# Patient Record
Sex: FEMALE | Race: WHITE | Employment: OTHER | URBAN - METROPOLITAN AREA
[De-identification: names, ages, dates, MRNs, and addresses within clinical notes are randomized per-mention and may not be internally consistent; named-entity substitution may affect disease eponyms.]

---

## 2023-01-20 PROBLEM — M99.03 SEGMENTAL AND SOMATIC DYSFUNCTION OF LUMBAR REGION: Status: ACTIVE | Noted: 2023-01-20

## 2023-01-20 PROBLEM — M50.30 DEGENERATION OF INTERVERTEBRAL DISC OF CERVICAL REGION: Status: ACTIVE | Noted: 2023-01-20

## 2023-01-20 PROBLEM — R09.82 POSTNASAL DRIP: Status: ACTIVE | Noted: 2023-01-20

## 2023-01-20 PROBLEM — M40.04 POSTURAL KYPHOSIS OF THORACIC REGION: Status: ACTIVE | Noted: 2023-01-20

## 2023-01-20 PROBLEM — M25.561 RIGHT KNEE PAIN: Status: ACTIVE | Noted: 2023-01-20

## 2023-01-20 PROBLEM — M99.04 SEGMENTAL AND SOMATIC DYSFUNCTION OF SACRAL REGION: Status: ACTIVE | Noted: 2023-01-20

## 2023-01-20 PROBLEM — M17.11 RIGHT KNEE DJD: Status: ACTIVE | Noted: 2023-01-20

## 2023-01-20 PROBLEM — M53.3 SACRAL BACK PAIN: Status: ACTIVE | Noted: 2023-01-20

## 2023-01-20 PROBLEM — M23.90 INTERNAL DERANGEMENT OF KNEE: Status: ACTIVE | Noted: 2023-01-20

## 2023-01-20 PROBLEM — M79.10 MYALGIA: Status: ACTIVE | Noted: 2023-01-20

## 2023-01-20 PROBLEM — M99.02 SEGMENTAL AND SOMATIC DYSFUNCTION OF THORACIC REGION: Status: ACTIVE | Noted: 2023-01-20

## 2023-01-20 PROBLEM — H93.19 TINNITUS: Status: ACTIVE | Noted: 2023-01-20

## 2023-01-20 PROBLEM — B37.31 VAGINAL YEAST INFECTION: Status: ACTIVE | Noted: 2023-01-20

## 2023-01-20 PROBLEM — E78.5 HYPERLIPIDEMIA: Status: ACTIVE | Noted: 2023-01-20

## 2023-01-20 PROBLEM — M99.00 SEGMENTAL AND SOMATIC DYSFUNCTION OF HEAD REGION: Status: ACTIVE | Noted: 2023-01-20

## 2023-01-20 PROBLEM — J45.909 ASTHMA (HHS-HCC): Status: ACTIVE | Noted: 2023-01-20

## 2023-01-20 PROBLEM — M54.41 ACUTE RIGHT-SIDED LOW BACK PAIN WITH RIGHT-SIDED SCIATICA: Status: ACTIVE | Noted: 2023-01-20

## 2023-01-20 PROBLEM — S80.12XA CONTUSION, KNEE AND LOWER LEG, LEFT, INITIAL ENCOUNTER: Status: ACTIVE | Noted: 2023-01-20

## 2023-01-20 PROBLEM — J34.2 DEVIATED NASAL SEPTUM: Status: ACTIVE | Noted: 2023-01-20

## 2023-01-20 PROBLEM — F90.9 ATTENTION DEFICIT HYPERACTIVITY DISORDER (ADHD): Status: ACTIVE | Noted: 2023-01-20

## 2023-01-20 PROBLEM — S83.8X1A INJURY OF MENISCUS OF RIGHT KNEE: Status: ACTIVE | Noted: 2023-01-20

## 2023-01-20 PROBLEM — M26.659 TMJ ARTHROPATHY: Status: ACTIVE | Noted: 2023-01-20

## 2023-01-20 PROBLEM — S00.33XA CONTUSION OF NOSE: Status: ACTIVE | Noted: 2023-01-20

## 2023-01-20 PROBLEM — M99.05 SEGMENTAL AND SOMATIC DYSFUNCTION OF PELVIC REGION: Status: ACTIVE | Noted: 2023-01-20

## 2023-01-20 PROBLEM — S80.02XA CONTUSION, KNEE AND LOWER LEG, LEFT, INITIAL ENCOUNTER: Status: ACTIVE | Noted: 2023-01-20

## 2023-01-20 PROBLEM — H90.3 BILATERAL SENSORINEURAL HEARING LOSS: Status: ACTIVE | Noted: 2023-01-20

## 2023-01-20 PROBLEM — R42 EQUILIBRIUM DISORDER: Status: ACTIVE | Noted: 2023-01-20

## 2023-01-20 PROBLEM — R53.83 FATIGUE: Status: ACTIVE | Noted: 2023-01-20

## 2023-01-20 PROBLEM — R29.6 FREQUENT FALLS: Status: ACTIVE | Noted: 2023-01-20

## 2023-01-20 PROBLEM — R14.0 ABDOMINAL BLOATING: Status: ACTIVE | Noted: 2023-01-20

## 2023-01-20 PROBLEM — M47.812 SPONDYLOSIS OF CERVICAL REGION WITHOUT MYELOPATHY OR RADICULOPATHY: Status: ACTIVE | Noted: 2023-01-20

## 2023-01-20 PROBLEM — R25.1 TREMOR: Status: ACTIVE | Noted: 2023-01-20

## 2023-01-20 PROBLEM — M25.461 EFFUSION OF RIGHT KNEE: Status: ACTIVE | Noted: 2023-01-20

## 2023-01-20 PROBLEM — G25.3 MYOCLONUS: Status: ACTIVE | Noted: 2023-01-20

## 2023-01-20 PROBLEM — M54.42 CHRONIC LEFT-SIDED LOW BACK PAIN WITH LEFT-SIDED SCIATICA: Status: ACTIVE | Noted: 2023-01-20

## 2023-01-20 PROBLEM — H90.5 SENSORINEURAL HEARING LOSS (SNHL): Status: ACTIVE | Noted: 2023-01-20

## 2023-01-20 PROBLEM — M54.2 NECK PAIN: Status: ACTIVE | Noted: 2023-01-20

## 2023-01-20 PROBLEM — J30.9 ALLERGIC RHINITIS: Status: ACTIVE | Noted: 2023-01-20

## 2023-01-20 PROBLEM — F41.9 ANXIETY: Status: ACTIVE | Noted: 2023-01-20

## 2023-01-20 PROBLEM — G44.86 HEADACHE, CERVICOGENIC: Status: ACTIVE | Noted: 2023-01-20

## 2023-01-20 PROBLEM — F33.9 RECURRENT MAJOR DEPRESSIVE DISORDER (CMS-HCC): Status: ACTIVE | Noted: 2023-01-20

## 2023-01-20 PROBLEM — M99.01 SEGMENTAL AND SOMATIC DYSFUNCTION OF CERVICAL REGION: Status: ACTIVE | Noted: 2023-01-20

## 2023-01-20 PROBLEM — N95.2 VAGINAL ATROPHY: Status: ACTIVE | Noted: 2023-01-20

## 2023-01-20 PROBLEM — M79.9 POSTURAL STRAIN: Status: ACTIVE | Noted: 2023-01-20

## 2023-01-20 PROBLEM — K59.09 CHRONIC CONSTIPATION: Status: ACTIVE | Noted: 2023-01-20

## 2023-01-20 PROBLEM — M79.89 MASS OF SOFT TISSUE OF RIGHT LOWER EXTREMITY: Status: ACTIVE | Noted: 2023-01-20

## 2023-01-20 PROBLEM — J32.0 MAXILLARY SINUSITIS: Status: ACTIVE | Noted: 2023-01-20

## 2023-01-20 PROBLEM — G89.29 CHRONIC LEFT-SIDED LOW BACK PAIN WITH LEFT-SIDED SCIATICA: Status: ACTIVE | Noted: 2023-01-20

## 2023-01-20 PROBLEM — R41.3 MEMORY LOSS: Status: ACTIVE | Noted: 2023-01-20

## 2023-01-20 RX ORDER — AZELASTINE 1 MG/ML
2 SPRAY, METERED NASAL 2 TIMES DAILY
COMMUNITY
Start: 2022-05-08 | End: 2023-10-03

## 2023-01-20 RX ORDER — FLUOXETINE HYDROCHLORIDE 40 MG/1
1 CAPSULE ORAL DAILY
COMMUNITY
Start: 2016-11-21 | End: 2023-12-14 | Stop reason: SDUPTHER

## 2023-01-20 RX ORDER — LORAZEPAM 0.5 MG/1
0.5 TABLET ORAL 3 TIMES DAILY PRN
COMMUNITY
Start: 2019-09-17 | End: 2023-12-14 | Stop reason: SDUPTHER

## 2023-01-20 RX ORDER — FLUTICASONE PROPIONATE AND SALMETEROL 100; 50 UG/1; UG/1
1 POWDER RESPIRATORY (INHALATION)
COMMUNITY
End: 2023-07-25

## 2023-01-20 RX ORDER — DULOXETIN HYDROCHLORIDE 30 MG/1
1 CAPSULE, DELAYED RELEASE ORAL DAILY
COMMUNITY
Start: 2022-05-31 | End: 2023-12-14 | Stop reason: SDUPTHER

## 2023-01-20 RX ORDER — LAMOTRIGINE 150 MG/1
2 TABLET ORAL DAILY
COMMUNITY
Start: 2020-12-15 | End: 2023-12-14 | Stop reason: SDUPTHER

## 2023-01-20 RX ORDER — VIT C/E/ZN/COPPR/LUTEIN/ZEAXAN 250MG-90MG
25 CAPSULE ORAL
COMMUNITY

## 2023-01-20 RX ORDER — MAGNESIUM 200 MG
TABLET ORAL
COMMUNITY
Start: 2021-12-20 | End: 2023-12-13 | Stop reason: WASHOUT

## 2023-01-20 RX ORDER — LEVOMEFOLATE/ALGAL OIL 15-90.314
1 CAPSULE ORAL DAILY
COMMUNITY
Start: 2021-11-17

## 2023-01-20 RX ORDER — CYANOCOBALAMIN (VITAMIN B-12) 250 MCG
250 TABLET ORAL
COMMUNITY
End: 2023-12-13 | Stop reason: WASHOUT

## 2023-01-20 RX ORDER — GUAIFENESIN 1200 MG/1
1 TABLET, EXTENDED RELEASE ORAL EVERY 12 HOURS
COMMUNITY

## 2023-01-20 RX ORDER — ALBUTEROL SULFATE 90 UG/1
1-2 AEROSOL, METERED RESPIRATORY (INHALATION) EVERY 6 HOURS PRN
COMMUNITY

## 2023-03-20 ENCOUNTER — OFFICE VISIT (OUTPATIENT)
Dept: PRIMARY CARE | Facility: CLINIC | Age: 68
End: 2023-03-20
Payer: MEDICARE

## 2023-03-20 VITALS
TEMPERATURE: 97.7 F | BODY MASS INDEX: 20.88 KG/M2 | RESPIRATION RATE: 16 BRPM | DIASTOLIC BLOOD PRESSURE: 50 MMHG | HEART RATE: 78 BPM | WEIGHT: 111.4 LBS | SYSTOLIC BLOOD PRESSURE: 114 MMHG | OXYGEN SATURATION: 98 %

## 2023-03-20 DIAGNOSIS — F33.40 RECURRENT MAJOR DEPRESSIVE DISORDER, IN REMISSION (CMS-HCC): ICD-10-CM

## 2023-03-20 DIAGNOSIS — J32.0 CHRONIC MAXILLARY SINUSITIS: ICD-10-CM

## 2023-03-20 DIAGNOSIS — R09.82 POSTNASAL DRIP: ICD-10-CM

## 2023-03-20 DIAGNOSIS — Z00.00 HEALTH MAINTENANCE EXAMINATION: Primary | ICD-10-CM

## 2023-03-20 DIAGNOSIS — Z12.31 ENCOUNTER FOR SCREENING MAMMOGRAM FOR MALIGNANT NEOPLASM OF BREAST: ICD-10-CM

## 2023-03-20 DIAGNOSIS — E78.2 MIXED HYPERLIPIDEMIA: ICD-10-CM

## 2023-03-20 DIAGNOSIS — Z00.00 HEALTHCARE MAINTENANCE: ICD-10-CM

## 2023-03-20 DIAGNOSIS — K59.04 CHRONIC IDIOPATHIC CONSTIPATION: ICD-10-CM

## 2023-03-20 DIAGNOSIS — J30.1 SEASONAL ALLERGIC RHINITIS DUE TO POLLEN: ICD-10-CM

## 2023-03-20 DIAGNOSIS — J45.20 MILD INTERMITTENT ASTHMA WITHOUT COMPLICATION (HHS-HCC): ICD-10-CM

## 2023-03-20 DIAGNOSIS — J32.8 OTHER CHRONIC SINUSITIS: ICD-10-CM

## 2023-03-20 DIAGNOSIS — K59.09 CHRONIC CONSTIPATION: ICD-10-CM

## 2023-03-20 PROBLEM — M54.42 CHRONIC LEFT-SIDED LOW BACK PAIN WITH LEFT-SIDED SCIATICA: Status: RESOLVED | Noted: 2023-01-20 | Resolved: 2023-03-20

## 2023-03-20 PROBLEM — S80.12XA CONTUSION, KNEE AND LOWER LEG, LEFT, INITIAL ENCOUNTER: Status: RESOLVED | Noted: 2023-01-20 | Resolved: 2023-03-20

## 2023-03-20 PROBLEM — H93.19 TINNITUS: Status: RESOLVED | Noted: 2023-01-20 | Resolved: 2023-03-20

## 2023-03-20 PROBLEM — M23.90 INTERNAL DERANGEMENT OF KNEE: Status: RESOLVED | Noted: 2023-01-20 | Resolved: 2023-03-20

## 2023-03-20 PROBLEM — R14.0 ABDOMINAL BLOATING: Status: RESOLVED | Noted: 2023-01-20 | Resolved: 2023-03-20

## 2023-03-20 PROBLEM — S80.02XA CONTUSION, KNEE AND LOWER LEG, LEFT, INITIAL ENCOUNTER: Status: RESOLVED | Noted: 2023-01-20 | Resolved: 2023-03-20

## 2023-03-20 PROBLEM — R42 EQUILIBRIUM DISORDER: Status: RESOLVED | Noted: 2023-01-20 | Resolved: 2023-03-20

## 2023-03-20 PROBLEM — M99.00 SEGMENTAL AND SOMATIC DYSFUNCTION OF HEAD REGION: Status: RESOLVED | Noted: 2023-01-20 | Resolved: 2023-03-20

## 2023-03-20 PROBLEM — M40.04 POSTURAL KYPHOSIS OF THORACIC REGION: Status: RESOLVED | Noted: 2023-01-20 | Resolved: 2023-03-20

## 2023-03-20 PROBLEM — M25.561 RIGHT KNEE PAIN: Status: RESOLVED | Noted: 2023-01-20 | Resolved: 2023-03-20

## 2023-03-20 PROBLEM — M54.2 NECK PAIN: Status: RESOLVED | Noted: 2023-01-20 | Resolved: 2023-03-20

## 2023-03-20 PROBLEM — M99.01 SEGMENTAL AND SOMATIC DYSFUNCTION OF CERVICAL REGION: Status: RESOLVED | Noted: 2023-01-20 | Resolved: 2023-03-20

## 2023-03-20 PROBLEM — M54.41 ACUTE RIGHT-SIDED LOW BACK PAIN WITH RIGHT-SIDED SCIATICA: Status: RESOLVED | Noted: 2023-01-20 | Resolved: 2023-03-20

## 2023-03-20 PROBLEM — S83.8X1A INJURY OF MENISCUS OF RIGHT KNEE: Status: RESOLVED | Noted: 2023-01-20 | Resolved: 2023-03-20

## 2023-03-20 PROBLEM — M50.30 DEGENERATION OF INTERVERTEBRAL DISC OF CERVICAL REGION: Status: RESOLVED | Noted: 2023-01-20 | Resolved: 2023-03-20

## 2023-03-20 PROBLEM — G89.29 CHRONIC LEFT-SIDED LOW BACK PAIN WITH LEFT-SIDED SCIATICA: Status: RESOLVED | Noted: 2023-01-20 | Resolved: 2023-03-20

## 2023-03-20 PROBLEM — R25.1 TREMOR: Status: RESOLVED | Noted: 2023-01-20 | Resolved: 2023-03-20

## 2023-03-20 PROBLEM — M79.10 MYALGIA: Status: RESOLVED | Noted: 2023-01-20 | Resolved: 2023-03-20

## 2023-03-20 PROBLEM — R29.6 FREQUENT FALLS: Status: RESOLVED | Noted: 2023-01-20 | Resolved: 2023-03-20

## 2023-03-20 PROBLEM — M25.461 EFFUSION OF RIGHT KNEE: Status: RESOLVED | Noted: 2023-01-20 | Resolved: 2023-03-20

## 2023-03-20 PROBLEM — R41.3 MEMORY LOSS: Status: RESOLVED | Noted: 2023-01-20 | Resolved: 2023-03-20

## 2023-03-20 PROBLEM — M79.89 MASS OF SOFT TISSUE OF RIGHT LOWER EXTREMITY: Status: RESOLVED | Noted: 2023-01-20 | Resolved: 2023-03-20

## 2023-03-20 PROBLEM — J34.2 DEVIATED NASAL SEPTUM: Status: RESOLVED | Noted: 2023-01-20 | Resolved: 2023-03-20

## 2023-03-20 PROBLEM — M99.04 SEGMENTAL AND SOMATIC DYSFUNCTION OF SACRAL REGION: Status: RESOLVED | Noted: 2023-01-20 | Resolved: 2023-03-20

## 2023-03-20 PROBLEM — S00.33XA CONTUSION OF NOSE: Status: RESOLVED | Noted: 2023-01-20 | Resolved: 2023-03-20

## 2023-03-20 PROBLEM — M99.03 SEGMENTAL AND SOMATIC DYSFUNCTION OF LUMBAR REGION: Status: RESOLVED | Noted: 2023-01-20 | Resolved: 2023-03-20

## 2023-03-20 PROBLEM — M79.9 POSTURAL STRAIN: Status: RESOLVED | Noted: 2023-01-20 | Resolved: 2023-03-20

## 2023-03-20 PROBLEM — R53.83 FATIGUE: Status: RESOLVED | Noted: 2023-01-20 | Resolved: 2023-03-20

## 2023-03-20 PROBLEM — M99.02 SEGMENTAL AND SOMATIC DYSFUNCTION OF THORACIC REGION: Status: RESOLVED | Noted: 2023-01-20 | Resolved: 2023-03-20

## 2023-03-20 PROBLEM — M17.11 RIGHT KNEE DJD: Status: RESOLVED | Noted: 2023-01-20 | Resolved: 2023-03-20

## 2023-03-20 PROBLEM — N95.2 VAGINAL ATROPHY: Status: RESOLVED | Noted: 2023-01-20 | Resolved: 2023-03-20

## 2023-03-20 PROBLEM — M99.05 SEGMENTAL AND SOMATIC DYSFUNCTION OF PELVIC REGION: Status: RESOLVED | Noted: 2023-01-20 | Resolved: 2023-03-20

## 2023-03-20 PROBLEM — B37.31 VAGINAL YEAST INFECTION: Status: RESOLVED | Noted: 2023-01-20 | Resolved: 2023-03-20

## 2023-03-20 PROCEDURE — 1036F TOBACCO NON-USER: CPT | Performed by: INTERNAL MEDICINE

## 2023-03-20 PROCEDURE — 99214 OFFICE O/P EST MOD 30 MIN: CPT | Performed by: INTERNAL MEDICINE

## 2023-03-20 PROCEDURE — 1160F RVW MEDS BY RX/DR IN RCRD: CPT | Performed by: INTERNAL MEDICINE

## 2023-03-20 PROCEDURE — 1159F MED LIST DOCD IN RCRD: CPT | Performed by: INTERNAL MEDICINE

## 2023-03-20 RX ORDER — CYANOCOBALAMIN (VITAMIN B-12) 500 MCG
TABLET ORAL
COMMUNITY

## 2023-03-20 RX ORDER — CYCLOBENZAPRINE HCL 10 MG
TABLET ORAL
COMMUNITY
Start: 2023-03-18

## 2023-03-20 RX ORDER — PREDNISONE 20 MG/1
TABLET ORAL
Qty: 15 TABLET | Refills: 0 | Status: SHIPPED | OUTPATIENT
Start: 2023-03-20 | End: 2023-04-01

## 2023-03-20 ASSESSMENT — ENCOUNTER SYMPTOMS
CONSTITUTIONAL NEGATIVE: 1
CONSTIPATION: 1
MUSCULOSKELETAL NEGATIVE: 1
RESPIRATORY NEGATIVE: 1
CARDIOVASCULAR NEGATIVE: 1

## 2023-03-20 NOTE — PROGRESS NOTES
Subjective   Patient ID: Naomi Dunaway is a 68 y.o. female who presents for Follow-up.  Feels well  Sinusitis has finally resolved with a repeat course of antibiotics and steroids in Feb /early march    Using flonase and mucinex  More frequent dyspnea again- more congested; coughing; pnd  Has known allergies to dust and mold - sees Dr Torres  She is using her advair regularly  Not using albuterol as much        Review of Systems   Constitutional: Negative.    Respiratory: Negative.     Cardiovascular: Negative.    Gastrointestinal:  Positive for constipation.   Genitourinary: Negative.    Musculoskeletal: Negative.        Objective   Physical Exam  Vitals and nursing note reviewed.   HENT:      Mouth/Throat:      Comments: Thick PND  Cardiovascular:      Rate and Rhythm: Normal rate and regular rhythm.   Pulmonary:      Effort: Pulmonary effort is normal.      Breath sounds: Normal breath sounds.         Assessment/Plan   Problem List Items Addressed This Visit       Allergic rhinitis    Relevant Orders    Referral to Allergy    Chronic constipation    Hyperlipidemia    Maxillary sinusitis    RESOLVED: Postnasal drip    Recurrent major depressive disorder (CMS/HCC)     Stable         Asthma     Other Visit Diagnoses       Health maintenance examination    -  Primary    Relevant Orders    Lipid Panel    TSH with reflex to Free T4 if abnormal    CBC    Healthcare maintenance        Relevant Orders    Comprehensive Metabolic Panel    Encounter for screening mammogram for malignant neoplasm of breast        Relevant Orders    BI mammo bilateral screening tomosynthesis    Chronic idiopathic constipation        Relevant Orders    CBC    Other chronic sinusitis        Relevant Medications    predniSONE (Deltasone) 20 mg tablet          \

## 2023-06-13 PROBLEM — M50.30 DEGENERATION OF INTERVERTEBRAL DISC OF CERVICAL REGION: Status: ACTIVE | Noted: 2023-06-13

## 2023-06-13 PROBLEM — R14.0 ABDOMINAL BLOATING: Status: ACTIVE | Noted: 2023-06-13

## 2023-06-13 PROBLEM — R29.6 FREQUENT FALLS: Status: ACTIVE | Noted: 2023-06-13

## 2023-06-13 PROBLEM — S80.02XA CONTUSION, KNEE AND LOWER LEG, LEFT, INITIAL ENCOUNTER: Status: ACTIVE | Noted: 2023-06-13

## 2023-06-13 PROBLEM — S83.8X1A INJURY OF MENISCUS OF RIGHT KNEE: Status: ACTIVE | Noted: 2023-06-13

## 2023-06-13 PROBLEM — J32.2 CHRONIC ETHMOIDAL SINUSITIS: Status: ACTIVE | Noted: 2023-06-13

## 2023-06-13 PROBLEM — R42 EQUILIBRIUM DISORDER: Status: ACTIVE | Noted: 2023-06-13

## 2023-06-13 PROBLEM — S00.33XA CONTUSION OF NOSE: Status: ACTIVE | Noted: 2023-06-13

## 2023-06-13 PROBLEM — G89.29 CHRONIC LEFT-SIDED LOW BACK PAIN WITH LEFT-SIDED SCIATICA: Status: ACTIVE | Noted: 2023-06-13

## 2023-06-13 PROBLEM — M54.2 NECK PAIN: Status: ACTIVE | Noted: 2023-06-13

## 2023-06-13 PROBLEM — K21.9 GERD (GASTROESOPHAGEAL REFLUX DISEASE): Status: ACTIVE | Noted: 2023-06-13

## 2023-06-13 PROBLEM — M25.561 RIGHT KNEE PAIN: Status: ACTIVE | Noted: 2023-06-13

## 2023-06-13 PROBLEM — H93.19 TINNITUS: Status: ACTIVE | Noted: 2023-06-13

## 2023-06-13 PROBLEM — B37.31 VAGINAL YEAST INFECTION: Status: ACTIVE | Noted: 2023-06-13

## 2023-06-13 PROBLEM — M79.89 MASS OF SOFT TISSUE OF RIGHT LOWER EXTREMITY: Status: ACTIVE | Noted: 2023-06-13

## 2023-06-13 PROBLEM — R09.82 POSTNASAL DRIP: Status: ACTIVE | Noted: 2023-06-13

## 2023-06-13 PROBLEM — M54.42 CHRONIC LEFT-SIDED LOW BACK PAIN WITH LEFT-SIDED SCIATICA: Status: ACTIVE | Noted: 2023-06-13

## 2023-06-13 PROBLEM — N95.2 VAGINAL ATROPHY: Status: ACTIVE | Noted: 2023-06-13

## 2023-06-13 PROBLEM — R09.81 NASAL CONGESTION: Status: ACTIVE | Noted: 2023-06-13

## 2023-06-13 PROBLEM — M25.461 EFFUSION OF RIGHT KNEE: Status: ACTIVE | Noted: 2023-06-13

## 2023-06-13 PROBLEM — M79.9 POSTURAL STRAIN: Status: ACTIVE | Noted: 2023-06-13

## 2023-06-13 PROBLEM — M79.10 MYALGIA: Status: ACTIVE | Noted: 2023-06-13

## 2023-06-13 PROBLEM — S80.12XA CONTUSION, KNEE AND LOWER LEG, LEFT, INITIAL ENCOUNTER: Status: ACTIVE | Noted: 2023-06-13

## 2023-06-13 PROBLEM — F33.42 RECURRENT MAJOR DEPRESSIVE DISORDER, IN FULL REMISSION (CMS-HCC): Status: ACTIVE | Noted: 2023-06-13

## 2023-06-13 PROBLEM — M99.09 SEGMENTAL AND SOMATIC DYSFUNCTION: Status: ACTIVE | Noted: 2023-06-13

## 2023-06-13 PROBLEM — R41.3 MEMORY LOSS: Status: ACTIVE | Noted: 2023-06-13

## 2023-06-13 PROBLEM — R53.83 FATIGUE: Status: ACTIVE | Noted: 2023-06-13

## 2023-06-13 PROBLEM — M23.90 INTERNAL DERANGEMENT OF KNEE: Status: ACTIVE | Noted: 2023-06-13

## 2023-06-13 PROBLEM — M54.41 ACUTE RIGHT-SIDED LOW BACK PAIN WITH RIGHT-SIDED SCIATICA: Status: ACTIVE | Noted: 2023-06-13

## 2023-06-13 PROBLEM — M40.04 POSTURAL KYPHOSIS OF THORACIC REGION: Status: ACTIVE | Noted: 2023-06-13

## 2023-06-13 PROBLEM — R25.1 TREMOR: Status: ACTIVE | Noted: 2023-06-13

## 2023-06-13 PROBLEM — J34.2 DEVIATED NASAL SEPTUM: Status: ACTIVE | Noted: 2023-06-13

## 2023-06-13 PROBLEM — M17.11 RIGHT KNEE DJD: Status: ACTIVE | Noted: 2023-06-13

## 2023-06-13 RX ORDER — MOMETASONE FUROATE 100 %
POWDER (GRAM) MISCELLANEOUS
COMMUNITY
Start: 2023-04-06 | End: 2024-04-15 | Stop reason: SDUPTHER

## 2023-06-13 RX ORDER — PANTOPRAZOLE SODIUM 40 MG/1
1 TABLET, DELAYED RELEASE ORAL DAILY
COMMUNITY
Start: 2023-05-30

## 2023-06-16 ENCOUNTER — LAB (OUTPATIENT)
Dept: LAB | Facility: LAB | Age: 68
End: 2023-06-16
Payer: MEDICARE

## 2023-06-16 ENCOUNTER — OFFICE VISIT (OUTPATIENT)
Dept: PRIMARY CARE | Facility: CLINIC | Age: 68
End: 2023-06-16
Payer: MEDICARE

## 2023-06-16 VITALS
TEMPERATURE: 97.1 F | BODY MASS INDEX: 20.58 KG/M2 | DIASTOLIC BLOOD PRESSURE: 62 MMHG | SYSTOLIC BLOOD PRESSURE: 128 MMHG | HEART RATE: 57 BPM | WEIGHT: 109.8 LBS | RESPIRATION RATE: 16 BRPM | OXYGEN SATURATION: 97 %

## 2023-06-16 DIAGNOSIS — F90.9 ATTENTION DEFICIT HYPERACTIVITY DISORDER (ADHD), UNSPECIFIED ADHD TYPE: ICD-10-CM

## 2023-06-16 DIAGNOSIS — Z00.00 HEALTHCARE MAINTENANCE: ICD-10-CM

## 2023-06-16 DIAGNOSIS — J30.2 SEASONAL ALLERGIC RHINITIS, UNSPECIFIED TRIGGER: ICD-10-CM

## 2023-06-16 DIAGNOSIS — K59.04 CHRONIC IDIOPATHIC CONSTIPATION: ICD-10-CM

## 2023-06-16 DIAGNOSIS — K21.9 GASTROESOPHAGEAL REFLUX DISEASE WITHOUT ESOPHAGITIS: ICD-10-CM

## 2023-06-16 DIAGNOSIS — Z00.00 HEALTH MAINTENANCE EXAMINATION: ICD-10-CM

## 2023-06-16 DIAGNOSIS — J45.20 MILD INTERMITTENT ASTHMA, UNSPECIFIED WHETHER COMPLICATED (HHS-HCC): ICD-10-CM

## 2023-06-16 DIAGNOSIS — Z00.00 ROUTINE GENERAL MEDICAL EXAMINATION AT HEALTH CARE FACILITY: Primary | ICD-10-CM

## 2023-06-16 PROBLEM — M79.9 POSTURAL STRAIN: Status: RESOLVED | Noted: 2023-06-13 | Resolved: 2023-06-16

## 2023-06-16 PROBLEM — M25.561 RIGHT KNEE PAIN: Status: RESOLVED | Noted: 2023-06-13 | Resolved: 2023-06-16

## 2023-06-16 PROBLEM — R42 EQUILIBRIUM DISORDER: Status: RESOLVED | Noted: 2023-06-13 | Resolved: 2023-06-16

## 2023-06-16 PROBLEM — M54.42 CHRONIC LEFT-SIDED LOW BACK PAIN WITH LEFT-SIDED SCIATICA: Status: RESOLVED | Noted: 2023-06-13 | Resolved: 2023-06-16

## 2023-06-16 PROBLEM — R09.82 POSTNASAL DRIP: Status: RESOLVED | Noted: 2023-06-13 | Resolved: 2023-06-16

## 2023-06-16 PROBLEM — M54.2 NECK PAIN: Status: RESOLVED | Noted: 2023-06-13 | Resolved: 2023-06-16

## 2023-06-16 PROBLEM — R29.6 FREQUENT FALLS: Status: RESOLVED | Noted: 2023-06-13 | Resolved: 2023-06-16

## 2023-06-16 PROBLEM — S00.33XA CONTUSION OF NOSE: Status: RESOLVED | Noted: 2023-06-13 | Resolved: 2023-06-16

## 2023-06-16 PROBLEM — G89.29 CHRONIC LEFT-SIDED LOW BACK PAIN WITH LEFT-SIDED SCIATICA: Status: RESOLVED | Noted: 2023-06-13 | Resolved: 2023-06-16

## 2023-06-16 PROBLEM — M54.41 ACUTE RIGHT-SIDED LOW BACK PAIN WITH RIGHT-SIDED SCIATICA: Status: RESOLVED | Noted: 2023-06-13 | Resolved: 2023-06-16

## 2023-06-16 PROBLEM — R53.83 FATIGUE: Status: RESOLVED | Noted: 2023-06-13 | Resolved: 2023-06-16

## 2023-06-16 LAB
ALANINE AMINOTRANSFERASE (SGPT) (U/L) IN SER/PLAS: 17 U/L (ref 7–45)
ALBUMIN (G/DL) IN SER/PLAS: 4.5 G/DL (ref 3.4–5)
ALKALINE PHOSPHATASE (U/L) IN SER/PLAS: 64 U/L (ref 33–136)
ANION GAP IN SER/PLAS: 10 MMOL/L (ref 10–20)
ASPARTATE AMINOTRANSFERASE (SGOT) (U/L) IN SER/PLAS: 25 U/L (ref 9–39)
BILIRUBIN TOTAL (MG/DL) IN SER/PLAS: 0.5 MG/DL (ref 0–1.2)
CALCIUM (MG/DL) IN SER/PLAS: 9.6 MG/DL (ref 8.6–10.3)
CARBON DIOXIDE, TOTAL (MMOL/L) IN SER/PLAS: 32 MMOL/L (ref 21–32)
CHLORIDE (MMOL/L) IN SER/PLAS: 102 MMOL/L (ref 98–107)
CHOLESTEROL (MG/DL) IN SER/PLAS: 266 MG/DL (ref 0–199)
CHOLESTEROL IN HDL (MG/DL) IN SER/PLAS: 87.8 MG/DL
CHOLESTEROL/HDL RATIO: 3
CREATININE (MG/DL) IN SER/PLAS: 0.89 MG/DL (ref 0.5–1.05)
ERYTHROCYTE DISTRIBUTION WIDTH (RATIO) BY AUTOMATED COUNT: 11.9 % (ref 11.5–14.5)
ERYTHROCYTE MEAN CORPUSCULAR HEMOGLOBIN CONCENTRATION (G/DL) BY AUTOMATED: 33.1 G/DL (ref 32–36)
ERYTHROCYTE MEAN CORPUSCULAR VOLUME (FL) BY AUTOMATED COUNT: 92 FL (ref 80–100)
ERYTHROCYTES (10*6/UL) IN BLOOD BY AUTOMATED COUNT: 4.1 X10E12/L (ref 4–5.2)
GFR FEMALE: 71 ML/MIN/1.73M2
GLUCOSE (MG/DL) IN SER/PLAS: 83 MG/DL (ref 74–99)
HEMATOCRIT (%) IN BLOOD BY AUTOMATED COUNT: 37.8 % (ref 36–46)
HEMOGLOBIN (G/DL) IN BLOOD: 12.5 G/DL (ref 12–16)
LDL: 161 MG/DL (ref 0–99)
LEUKOCYTES (10*3/UL) IN BLOOD BY AUTOMATED COUNT: 5.1 X10E9/L (ref 4.4–11.3)
PLATELETS (10*3/UL) IN BLOOD AUTOMATED COUNT: 364 X10E9/L (ref 150–450)
POTASSIUM (MMOL/L) IN SER/PLAS: 4.3 MMOL/L (ref 3.5–5.3)
PROTEIN TOTAL: 6.9 G/DL (ref 6.4–8.2)
SODIUM (MMOL/L) IN SER/PLAS: 140 MMOL/L (ref 136–145)
THYROTROPIN (MIU/L) IN SER/PLAS BY DETECTION LIMIT <= 0.05 MIU/L: 1.99 MIU/L (ref 0.44–3.98)
TRIGLYCERIDE (MG/DL) IN SER/PLAS: 84 MG/DL (ref 0–149)
UREA NITROGEN (MG/DL) IN SER/PLAS: 15 MG/DL (ref 6–23)
VLDL: 17 MG/DL (ref 0–40)

## 2023-06-16 PROCEDURE — 80061 LIPID PANEL: CPT

## 2023-06-16 PROCEDURE — 99214 OFFICE O/P EST MOD 30 MIN: CPT | Performed by: INTERNAL MEDICINE

## 2023-06-16 PROCEDURE — 85027 COMPLETE CBC AUTOMATED: CPT

## 2023-06-16 PROCEDURE — 1170F FXNL STATUS ASSESSED: CPT | Performed by: INTERNAL MEDICINE

## 2023-06-16 PROCEDURE — 1036F TOBACCO NON-USER: CPT | Performed by: INTERNAL MEDICINE

## 2023-06-16 PROCEDURE — 84443 ASSAY THYROID STIM HORMONE: CPT

## 2023-06-16 PROCEDURE — 1160F RVW MEDS BY RX/DR IN RCRD: CPT | Performed by: INTERNAL MEDICINE

## 2023-06-16 PROCEDURE — 36415 COLL VENOUS BLD VENIPUNCTURE: CPT

## 2023-06-16 PROCEDURE — 1159F MED LIST DOCD IN RCRD: CPT | Performed by: INTERNAL MEDICINE

## 2023-06-16 PROCEDURE — G0439 PPPS, SUBSEQ VISIT: HCPCS | Performed by: INTERNAL MEDICINE

## 2023-06-16 PROCEDURE — 80053 COMPREHEN METABOLIC PANEL: CPT

## 2023-06-16 ASSESSMENT — PATIENT HEALTH QUESTIONNAIRE - PHQ9
SUM OF ALL RESPONSES TO PHQ9 QUESTIONS 1 AND 2: 0
2. FEELING DOWN, DEPRESSED OR HOPELESS: NOT AT ALL
1. LITTLE INTEREST OR PLEASURE IN DOING THINGS: NOT AT ALL

## 2023-06-16 ASSESSMENT — ENCOUNTER SYMPTOMS
TREMORS: 0
NAUSEA: 0
DIZZINESS: 0
PALPITATIONS: 0
CHEST TIGHTNESS: 0
VOICE CHANGE: 0
ARTHRALGIAS: 0
DIARRHEA: 0
UNEXPECTED WEIGHT CHANGE: 0
DIFFICULTY URINATING: 0
VOMITING: 0
TROUBLE SWALLOWING: 0
COUGH: 0
LIGHT-HEADEDNESS: 0
WHEEZING: 0
ACTIVITY CHANGE: 0
APPETITE CHANGE: 0
BLOOD IN STOOL: 0
HEMATURIA: 0
FATIGUE: 0
HEADACHES: 0
CONSTIPATION: 0

## 2023-06-16 ASSESSMENT — ACTIVITIES OF DAILY LIVING (ADL)
DRESSING: INDEPENDENT
BATHING: INDEPENDENT
GROCERY_SHOPPING: INDEPENDENT
TAKING_MEDICATION: INDEPENDENT
MANAGING_FINANCES: INDEPENDENT
DOING_HOUSEWORK: INDEPENDENT

## 2023-06-16 NOTE — PROGRESS NOTES
Subjective   Reason for Visit: Naomi Dunaway is an 68 y.o. female here for a Medicare Wellness visit.     Past Medical, Surgical, and Family History reviewed and updated in chart.    Reviewed all medications by prescribing practitioner or clinical pharmacist (such as prescriptions, OTCs, herbal therapies and supplements) and documented in the medical record.    Here for [facility]    She works once a week as a [employer]           Patient Care Team:  Sherly Soares MD as PCP - General  Sher[place] Soares MD as PCP [place] [facility] [place]  Vinicius Acharya MD as Psychiatrist (Psychiatry)     Review of Systems   Constitutional:  Negative for activity change, appetite change, fatigue and unexpected weight change.   HENT:  Negative for ear pain, hearing loss, tinnitus, trouble swallowing and voice change.    Eyes:  Negative for visual disturbance.   Respiratory:  Negative for cough, chest tightness and wheezing.    Cardiovascular:  Negative for chest pain, palpitations and leg swelling.   Gastrointestinal:  Negative for blood in stool, constipation, diarrhea, nausea and vomiting.   Genitourinary:  Negative for difficulty urinating, hematuria and vaginal bleeding.   Musculoskeletal:  Negative for arthralgias.   Skin:  Negative for rash.   Neurological:  Negative for dizziness, tremors, syncope, light-headedness and headaches.       Objective   Vitals:  /62   Pulse 57   Temp 36.2 °C (97.1 °F)   Resp 16   Wt 49.8 kg (109 lb 12.8 oz)   SpO2 97%   BMI 20.58 kg/m²       Physical Exam  Vitals and nursing note reviewed.   Constitutional:       General: She is not in acute distress.     Appearance: Normal appearance.   HENT:      Head: Normocephalic.      Right Ear: Tympanic membrane and ear canal normal. There is no impacted cerumen.      Left Ear: Tympanic membrane and ear canal normal. There is no impacted cerumen.      Nose: Nose normal.      Mouth/Throat:      Mouth: Mucous membranes are moist.       Pharynx: No oropharyngeal exudate or posterior oropharyngeal erythema.   Eyes:      Extraocular Movements: Extraocular movements intact.      Conjunctiva/sclera: Conjunctivae normal.      Pupils: Pupils are equal, round, and reactive to light.   Cardiovascular:      Rate and Rhythm: Normal rate and regular rhythm.      Pulses: Normal pulses.      Heart sounds: Normal heart sounds.   Pulmonary:      Effort: Pulmonary effort is normal.      Breath sounds: Normal breath sounds.   Chest:   Breasts:     Right: Normal.      Left: Normal.   Abdominal:      General: Abdomen is flat. Bowel sounds are normal.      Palpations: Abdomen is soft.   Genitourinary:     Labia:         Right: No rash.         Left: No rash.    Musculoskeletal:         General: No swelling, tenderness or deformity.      Cervical back: Normal range of motion and neck supple.      Right lower leg: No edema.      Left lower leg: No edema.   Lymphadenopathy:      Upper Body:      Right upper body: No axillary adenopathy.      Left upper body: No axillary adenopathy.   Skin:     General: Skin is warm and dry.   Neurological:      General: No focal deficit present.      Mental Status: She is alert and oriented to person, place, and time. Mental status is at baseline.   Psychiatric:         Mood and Affect: Mood normal.         Assessment/Plan   Problem List Items Addressed This Visit       Allergic rhinitis    Current Assessment & Plan     Per Dr Salomon         Attention deficit hyperactivity disorder (ADHD)    Asthma - Primary    Current Assessment & Plan     Uses albuterol preexercise  Feels controlled on Advair         GERD (gastroesophageal reflux disease)    Recurrent major depressive disorder, in full remission (CMS/[facility])    Current Assessment & Plan     Sees psychiatry          Other Visit Diagnoses       Health maintenance examination              Follow up with me in 6 months

## 2023-07-04 LAB
ALLERGEN ANIMAL: CAT DANDER IGE (KU/L): <0.1 KU/L
ALLERGEN ANIMAL: DOG DANDER IGE (KU/L): <0.1 KU/L
ALLERGEN GRASS: BERMUDA GRASS (CYNODON DACTYLON) IGE (KU/L): <0.1 KU/L
ALLERGEN GRASS: JOHNSON GRASS (SORGHUM HALEPENSE) IGE (KU/L): <0.1 KU/L
ALLERGEN GRASS: MEADOW GRASS, KENTUCKY BLUE (POA PRATENSIS )IGE (KU/L): <0.1 KU/L
ALLERGEN GRASS: TIMOTHY GRASS (PHLEUM PRATENSE) IGE (KU/L): <0.1 KU/L
ALLERGEN INSECT: COCKROACH IGE: <0.1 KU/L
ALLERGEN MICROORGANISM: ALTERNARIA ALTERNATA IGE (KU/L): <0.1 KU/L
ALLERGEN MICROORGANISM: ASPERGILLUS FUMIGATUS IGE (KU/L): <0.1 KU/L
ALLERGEN MICROORGANISM: CLADOSPORIUM HERBARUM IGE (KU/L): <0.1 KU/L
ALLERGEN MICROORGANISM: PENICILLIUM CHRYSOGENUM (P. NOTATUM) IGE (KU/L): <0.1 KU/L
ALLERGEN MITE: DERMATOPHAGOIDES FARINAE (HOUSE DUST MITE) IGE (KU/L): <0.1 KU/L
ALLERGEN MITE: DERMATOPHAGOIDES PTERONYSSINUS (HOUSE DUST MITE) IGE (KU/L): <0.1 KU/L
ALLERGEN TREE: BOX-ELDER (ACER NEGUNDO) IGE (KU/L): <0.1 KU/L
ALLERGEN TREE: COMMON SILVER BIRCH (BETULA VERRUCOSA) IGE (KU/L): <0.1 KU/L
ALLERGEN TREE: COTTONWOOD (POPULUS DELTOIDES) IGE (KU/L): <0.1 KU/L
ALLERGEN TREE: ELM (ULMUS AMERICANA) IGE (KU/L): <0.1 KU/L
ALLERGEN TREE: MAPLE LEAF SYCAMORE, LONDON PLANE IGE (KU/L): <0.1 KU/L
ALLERGEN TREE: MOUNTAIN JUNIPER (JUNIPERUS SABINOIDES) IGE (KU/L): <0.1 KU/L
ALLERGEN TREE: MULBERRY (MORUS ALBA) IGE (KU/L): <0.1 KU/L
ALLERGEN TREE: OAK (QUERCUS ALBA) IGE (KU/L): <0.1 KU/L
ALLERGEN TREE: PECAN, HICKORY (CARYA PECAN) IGE (KU/L): <0.1 KU/L
ALLERGEN TREE: WALNUT IGE: <0.1 KU/L
ALLERGEN TREE: WHITE ASH (FRAXINUS AMERICANA) IGE (KU/L): <0.1 KU/L
ALLERGEN WEED: COMMON PIGWEED (AMARANTHUS RETROFLEXUS) IGE (KU/L): <0.1 KU/L
ALLERGEN WEED: COMMON RAGWEED (AMB. ARTEMISIIFOLIA/A. ELATIOR) IGE (KU/L): <0.1 KU/L
ALLERGEN WEED: GOOSEFOOT, LAMB'S QUARTERS (CHENOPODIUM ALBUM) IGE (KU/L): <0.1 KU/L
ALLERGEN WEED: PLANTAIN (ENGLISH), RIBWORT (PLANTAGO LANCEOLATA) IGE (KU/L): <0.1 KU/L
ALLERGEN WEED: PRICKLY SALTWORT/RUSSIAN THISTLE (SALSOLA KALI) IGE (KU/L): <0.1 KU/L
ALLERGEN WEED: SHEEP SORREL (RUMEX ACETOSELLA) IGE (KU/L): <0.1 KU/L
IMMUNOCAP IGE: 499 KU/L (ref 0–214)
IMMUNOCAP INTERPRETATION: ABNORMAL

## 2023-07-25 DIAGNOSIS — J45.909 UNSPECIFIED ASTHMA, UNCOMPLICATED (HHS-HCC): ICD-10-CM

## 2023-07-25 RX ORDER — CEPHALEXIN 250 MG/1
CAPSULE ORAL
Qty: 60 EACH | Refills: 2 | Status: SHIPPED | OUTPATIENT
Start: 2023-07-25 | End: 2023-10-11

## 2023-08-24 PROBLEM — M77.42 METATARSALGIA OF LEFT FOOT: Status: ACTIVE | Noted: 2020-09-21

## 2023-08-24 PROBLEM — M23.006: Status: ACTIVE | Noted: 2021-10-04

## 2023-08-24 PROBLEM — S83.281A TEAR OF LATERAL MENISCUS OF RIGHT KNEE: Status: ACTIVE | Noted: 2021-10-04

## 2023-08-24 PROBLEM — F33.1 MODERATE EPISODE OF RECURRENT MAJOR DEPRESSIVE DISORDER (MULTI): Status: ACTIVE | Noted: 2023-08-24

## 2023-08-24 PROBLEM — M17.11 PRIMARY OSTEOARTHRITIS OF RIGHT KNEE: Status: ACTIVE | Noted: 2021-10-04

## 2023-08-24 PROBLEM — Z98.890 S/P LATERAL MENISCECTOMY OF RIGHT KNEE: Status: ACTIVE | Noted: 2022-03-08

## 2023-08-24 PROBLEM — J34.3 HYPERTROPHY OF BOTH INFERIOR NASAL TURBINATES: Status: ACTIVE | Noted: 2023-08-24

## 2023-08-24 RX ORDER — AMOXICILLIN AND CLAVULANATE POTASSIUM 875; 125 MG/1; MG/1
1 TABLET, FILM COATED ORAL 2 TIMES DAILY
COMMUNITY
Start: 2022-11-28 | End: 2023-10-03 | Stop reason: ALTCHOICE

## 2023-08-24 RX ORDER — LAMOTRIGINE 100 MG/1
TABLET ORAL
COMMUNITY
Start: 2010-09-08 | End: 2023-10-03 | Stop reason: SDUPTHER

## 2023-08-24 RX ORDER — FLUNISOLIDE 0.25 MG/ML
2 SOLUTION NASAL 2 TIMES DAILY PRN
COMMUNITY
Start: 2014-11-21 | End: 2023-10-03

## 2023-08-25 LAB
CALCIDIOL (25 OH VITAMIN D3) (NG/ML) IN SER/PLAS: 45 NG/ML
COBALAMIN (VITAMIN B12) (PG/ML) IN SER/PLAS: 171 PG/ML (ref 211–911)
MAGNESIUM (MG/DL) IN SER/PLAS: 2.04 MG/DL (ref 1.6–2.4)

## 2023-09-08 ENCOUNTER — TELEPHONE (OUTPATIENT)
Dept: PRIMARY CARE | Facility: CLINIC | Age: 68
End: 2023-09-08
Payer: COMMERCIAL

## 2023-09-08 NOTE — TELEPHONE ENCOUNTER
Patient states she had her B-12 levels checked and it was low. States it was suggested by provider who ordered it, to get a B-12 shot. Also, patient states she suffers from depression.     Patient wants to know if you could order it.     Please advise

## 2023-09-11 ENCOUNTER — CLINICAL SUPPORT (OUTPATIENT)
Dept: PRIMARY CARE | Facility: CLINIC | Age: 68
End: 2023-09-11
Payer: MEDICARE

## 2023-09-11 DIAGNOSIS — E53.8 VITAMIN B12 DEFICIENCY: ICD-10-CM

## 2023-09-11 PROCEDURE — 96372 THER/PROPH/DIAG INJ SC/IM: CPT | Performed by: INTERNAL MEDICINE

## 2023-09-11 RX ORDER — CYANOCOBALAMIN 1000 UG/ML
1000 INJECTION, SOLUTION INTRAMUSCULAR; SUBCUTANEOUS ONCE
Status: COMPLETED | OUTPATIENT
Start: 2023-09-11 | End: 2023-09-11

## 2023-09-11 RX ADMIN — CYANOCOBALAMIN 1000 MCG: 1000 INJECTION, SOLUTION INTRAMUSCULAR; SUBCUTANEOUS at 14:08

## 2023-09-11 NOTE — TELEPHONE ENCOUNTER
I left the patient a voicemail and advised the message and asked her to call the office back to schedule an appointment

## 2023-09-12 ENCOUNTER — APPOINTMENT (OUTPATIENT)
Dept: PRIMARY CARE | Facility: CLINIC | Age: 68
End: 2023-09-12
Payer: MEDICARE

## 2023-09-19 ENCOUNTER — CLINICAL SUPPORT (OUTPATIENT)
Dept: PRIMARY CARE | Facility: CLINIC | Age: 68
End: 2023-09-19
Payer: MEDICARE

## 2023-09-19 DIAGNOSIS — E53.8 VITAMIN B12 DEFICIENCY: ICD-10-CM

## 2023-09-19 PROCEDURE — 96372 THER/PROPH/DIAG INJ SC/IM: CPT | Performed by: INTERNAL MEDICINE

## 2023-09-19 RX ORDER — CYANOCOBALAMIN 1000 UG/ML
1000 INJECTION, SOLUTION INTRAMUSCULAR; SUBCUTANEOUS
Status: SHIPPED | OUTPATIENT
Start: 2023-09-19

## 2023-09-19 RX ADMIN — CYANOCOBALAMIN 1000 MCG: 1000 INJECTION, SOLUTION INTRAMUSCULAR; SUBCUTANEOUS at 10:55

## 2023-09-26 ENCOUNTER — CLINICAL SUPPORT (OUTPATIENT)
Dept: PRIMARY CARE | Facility: CLINIC | Age: 68
End: 2023-09-26
Payer: MEDICARE

## 2023-09-26 DIAGNOSIS — E53.8 VITAMIN B12 DEFICIENCY: ICD-10-CM

## 2023-09-26 PROCEDURE — 96372 THER/PROPH/DIAG INJ SC/IM: CPT | Performed by: INTERNAL MEDICINE

## 2023-09-26 RX ORDER — CYANOCOBALAMIN 1000 UG/ML
1000 INJECTION, SOLUTION INTRAMUSCULAR; SUBCUTANEOUS ONCE
Status: COMPLETED | OUTPATIENT
Start: 2023-09-26 | End: 2023-09-26

## 2023-09-26 RX ADMIN — CYANOCOBALAMIN 1000 MCG: 1000 INJECTION, SOLUTION INTRAMUSCULAR; SUBCUTANEOUS at 09:00

## 2023-10-02 PROBLEM — M77.42 METATARSALGIA OF LEFT FOOT: Status: RESOLVED | Noted: 2020-09-21 | Resolved: 2023-10-02

## 2023-10-02 PROBLEM — J32.0 MAXILLARY SINUSITIS: Status: RESOLVED | Noted: 2023-01-20 | Resolved: 2023-10-02

## 2023-10-02 PROBLEM — M26.659 TMJ ARTHROPATHY: Status: RESOLVED | Noted: 2023-01-20 | Resolved: 2023-10-02

## 2023-10-03 ENCOUNTER — CLINICAL SUPPORT (OUTPATIENT)
Dept: PRIMARY CARE | Facility: CLINIC | Age: 68
End: 2023-10-03
Payer: MEDICARE

## 2023-10-03 ENCOUNTER — OFFICE VISIT (OUTPATIENT)
Dept: ALLERGY | Facility: CLINIC | Age: 68
End: 2023-10-03
Payer: MEDICARE

## 2023-10-03 DIAGNOSIS — J45.20 MILD INTERMITTENT ASTHMA, UNSPECIFIED WHETHER COMPLICATED (HHS-HCC): ICD-10-CM

## 2023-10-03 DIAGNOSIS — J30.2 SEASONAL ALLERGIC RHINITIS, UNSPECIFIED TRIGGER: Primary | ICD-10-CM

## 2023-10-03 DIAGNOSIS — K21.9 GASTROESOPHAGEAL REFLUX DISEASE, UNSPECIFIED WHETHER ESOPHAGITIS PRESENT: ICD-10-CM

## 2023-10-03 PROCEDURE — 1160F RVW MEDS BY RX/DR IN RCRD: CPT | Performed by: ALLERGY & IMMUNOLOGY

## 2023-10-03 PROCEDURE — 1159F MED LIST DOCD IN RCRD: CPT | Performed by: ALLERGY & IMMUNOLOGY

## 2023-10-03 PROCEDURE — 96372 THER/PROPH/DIAG INJ SC/IM: CPT | Performed by: INTERNAL MEDICINE

## 2023-10-03 PROCEDURE — 99214 OFFICE O/P EST MOD 30 MIN: CPT | Performed by: ALLERGY & IMMUNOLOGY

## 2023-10-03 PROCEDURE — 1036F TOBACCO NON-USER: CPT | Performed by: ALLERGY & IMMUNOLOGY

## 2023-10-03 PROCEDURE — 1126F AMNT PAIN NOTED NONE PRSNT: CPT | Performed by: ALLERGY & IMMUNOLOGY

## 2023-10-03 RX ADMIN — CYANOCOBALAMIN 1000 MCG: 1000 INJECTION, SOLUTION INTRAMUSCULAR; SUBCUTANEOUS at 12:00

## 2023-10-03 ASSESSMENT — ENCOUNTER SYMPTOMS
AGITATION: 0
SORE THROAT: 0
ALLERGIC/IMMUNOLOGIC NEGATIVE: 1
CONFUSION: 0
PALPITATIONS: 0
WHEEZING: 0
SHORTNESS OF BREATH: 1
COUGH: 0
SLEEP DISTURBANCE: 0
STRIDOR: 0
SINUS PRESSURE: 0
DYSPHORIC MOOD: 0
RHINORRHEA: 0
CONSTITUTIONAL NEGATIVE: 1
SINUS PAIN: 0
CHEST TIGHTNESS: 0

## 2023-10-03 NOTE — PATIENT INSTRUCTIONS
Continue pantoprazole.    If vitamin B12 does not increase, let me know so I can increase pantoprazole.    Continue Advair one time a day in the morning.  If doing well, continue.  If no improvement, may increased to twice a day.    Use albuterol prior to exercise and as needed.    Follow up in 6 months unless symptoms worsen prior.

## 2023-10-03 NOTE — PROGRESS NOTES
Subjective   Patient ID:   [id number]   Naomi Dunaway is a 68 y.o. female who presents for Cough, Asthma, and GERD.    Chief Complaint   Patient presents with    Cough    Asthma    GERD        Patient reports improvement in her heartburn due to pantoprazole.  She was consuming TUMS and Pepcid too frequently.  She went to the [facility] with blood work showing deficient B12.  She started supplemental injections and had her 4th injection recently.      Patient thinks the [facility] and mometasone are helping her sinus symptoms.  Patient had chronic bronchitis as a child because her mother was a smoker.  She admits she was a smoker x15 years.  Patient admits to increased infections last year requiring steroids as well as COVID, but she notes this is not her typical.      Patient is using her albuterol prior to exertion and for SOB about 4x a week currently.   She continues Advair one time a day 3 times a week.          Review of Systems   Constitutional: Negative.    HENT:  Negative for ear discharge, ear pain, rhinorrhea, sinus pressure, sinus pain, sneezing and sore throat.    Respiratory:  Positive for shortness of breath. Negative for cough, chest tightness, wheezing and stridor.    Cardiovascular:  Negative for chest pain, palpitations and leg swelling.   Skin:  Negative for rash.   Allergic/Immunologic: Negative.  Negative for environmental allergies, food allergies and immunocompromised state.   Psychiatric/Behavioral:  Negative for agitation, confusion, dysphoric mood and sleep disturbance.          Objective     There were no vitals taken for this visit.     Physical Exam  Constitutional:       Appearance: Normal appearance.   HENT:      Head: Normocephalic and atraumatic.      Right Ear: External ear normal. There is no impacted cerumen.      Left Ear: External ear normal. There is no impacted cerumen.      Nose: Congestion present. No rhinorrhea.   Eyes:      Extraocular Movements:  Extraocular movements intact.      Conjunctiva/sclera: Conjunctivae normal.      Pupils: Pupils are equal, round, and reactive to light.   Cardiovascular:      Rate and Rhythm: Normal rate and regular rhythm.      Heart sounds: No murmur heard.     No friction rub. No gallop.   Pulmonary:      Effort: No respiratory distress.      Breath sounds: No wheezing, rhonchi or rales.   Skin:     General: Skin is warm and dry.   Neurological:      Mental Status: She is alert.   Psychiatric:         Mood and Affect: Mood normal.         Behavior: Behavior normal.                  No orders of the defined types were placed in this encounter.         Assessment/Plan         Asthma  Overall she is doing well.  She is approximately 4 times a week, but she has been noncompliant with her Advair.  She is using her Advair approximately 3 times a week. (It is prescribed 14 times or BID dosing) have asked her to increase her Advair to at least 1 time a day and then to twice a day if her symptoms are still not controlled.  We discussed that Advair as an anti-inflammatory help control her overall symptoms and asthma.  She will continue her Advair daily and her albuterol as needed    GERD (gastroesophageal reflux disease)  She is very pleased that her GERD is now under control.  She has recently developed B12 deficiency.  It is unknown if it is related to the pantoprazole.  If her B12 cannot be controlled with injections we will decrease the pantoprazole to 20 mg          Scribe Attestation  By signing my name below, IAnastasia Scribe, attest that this documentation has been prepared under the direction and in the presence of Tosha Salomon MD.      All medical record entries made by the Melva were at my direction and personally dictated by me. I have reviewed the chart and agree that the record accurately reflects my personal performance of the history, physical exam, discussion and plan.

## 2023-10-03 NOTE — ASSESSMENT & PLAN NOTE
Overall she is doing well.  She is approximately 4 times a week, but she has been noncompliant with her Advair.  She is using her Advair approximately 3 times a week. (It is prescribed 14 times or BID dosing) have asked her to increase her Advair to at least 1 time a day and then to twice a day if her symptoms are still not controlled.  We discussed that Advair as an anti-inflammatory help control her overall symptoms and asthma.  She will continue her Advair daily and her albuterol as needed

## 2023-10-03 NOTE — ASSESSMENT & PLAN NOTE
She is very pleased that her GERD is now under control.  She has recently developed B12 deficiency.  It is unknown if it is related to the pantoprazole.  If her B12 cannot be controlled with injections we will decrease the pantoprazole to 20 mg

## 2023-10-04 ENCOUNTER — ALLIED HEALTH (OUTPATIENT)
Dept: INTEGRATIVE MEDICINE | Facility: CLINIC | Age: 68
End: 2023-10-04
Payer: MEDICARE

## 2023-10-04 VITALS
DIASTOLIC BLOOD PRESSURE: 67 MMHG | WEIGHT: 108 LBS | BODY MASS INDEX: 20.08 KG/M2 | SYSTOLIC BLOOD PRESSURE: 119 MMHG | HEART RATE: 66 BPM

## 2023-10-04 DIAGNOSIS — Z71.82 EXERCISE COUNSELING: ICD-10-CM

## 2023-10-04 DIAGNOSIS — R06.02 SHORTNESS OF BREATH: Primary | Chronic | ICD-10-CM

## 2023-10-04 DIAGNOSIS — E53.8 B12 DEFICIENCY: ICD-10-CM

## 2023-10-04 PROCEDURE — 99215 OFFICE O/P EST HI 40 MIN: CPT | Performed by: PHYSICIAN ASSISTANT

## 2023-10-04 NOTE — PATIENT INSTRUCTIONS
Keep up the good work with hydration!  Complete chest x-ray at your earliest convenience  Physical activity:  Schedule group movement in the beginning of the week  Look into yoga and Jerry Chi classes   YouTube Channel:  Yoga with Henna  Jerry Chi  Look into rebounding/mini trampoline with a handle  Labs:  Repeat B12 after November 1st  Supplements: (sent via [facility])   Magnesium glycinate- 1 capsule after dinner  Quercetin  Resources:  Gut Health to [employer] [facility]   Constipation

## 2023-10-04 NOTE — PROGRESS NOTES
Integrative Medicine Consult:    Successes:  - Started B12 injections-- improved motivation, energy, and mood; less napping  - Physical activity- working with a  once/week, group walks, group kayaking   - Trying to be proactive with mood management during change of season-- will use mood lamp      Challenges:  - SOB- worsening over the last year after covid infection; dx with cough-variant asthma and GERD-- started on PPI and NeilMed sinus with some improvement; no imaging since symptom onset   - Chronic constipation-  unchanged; provided constipation handout-- will review further at next visit       Assessment/Plan:  - Repeat B12 after 8 injections-- may need to add concurrent sublingual B12   - Quercetin/vitamin C supplement x 2-3 months for upper resp symptoms. No contraindications with current medications/supplements or health history. Recommend to begin in stepwise fashion to determine tolerance and efficacy.  - SOB- chest xray due to crackles heard at [facility] and no recent imaging  - Exercise- reviewed certain mvmt practices that can improve balance and coordination         ROS:  Constitutional: afebrile  Respiratory: + int shortness of breath  Cardiovascular: no chest  pain  Abdominal: no abdominal pain, no n/v/d  Musculoskeletal: no joint pain or swelling   Skin: no rash    Physical Exam:  General: alert and oriented; well-developed, well-nourished, no acute distress  HEENT: normocephalic, atraumatic, good eye contact  Respiratory: no labored breathing, no conversational dyspnea; Rales at LLL  Musculoskeletal: moving all extremities appropriately  Neurology: normal gait  Psych: pleasant affect, cooperative      Next visit: constipation

## 2023-10-06 ENCOUNTER — ANCILLARY PROCEDURE (OUTPATIENT)
Dept: RADIOLOGY | Facility: CLINIC | Age: 68
End: 2023-10-06
Payer: MEDICARE

## 2023-10-06 DIAGNOSIS — R06.02 SHORTNESS OF BREATH: Chronic | ICD-10-CM

## 2023-10-06 PROCEDURE — 71046 X-RAY EXAM CHEST 2 VIEWS: CPT | Mod: FY

## 2023-10-06 PROCEDURE — 71046 X-RAY EXAM CHEST 2 VIEWS: CPT | Performed by: RADIOLOGY

## 2023-10-09 ASSESSMENT — ENCOUNTER SYMPTOMS
NECK STIFFNESS: 1
FEVER: 0
MYALGIAS: 1
FACIAL ASYMMETRY: 0
CHILLS: 0
AGITATION: 0
NECK PAIN: 1
ARTHRALGIAS: 1
ABDOMINAL PAIN: 0
BACK PAIN: 1
DIFFICULTY URINATING: 0
CONFUSION: 0
NAUSEA: 0
SHORTNESS OF BREATH: 0

## 2023-10-09 NOTE — PROGRESS NOTES
Subjective   Patient ID: Naomi Dunaway is a 68 y.o. female who presents today for the treatment of pain involving their neck, upper back and L sacral pains.    This is visit 13 of the calendar year. Medicare.     Fall risk: None. No falls in the last 6 months.     HPI -Today she like to continue to treat her chronically tight neck, upper back and left hip and sacral pains.  She has been managing quite well and is staying active.  However she is having some difficulty with her shortness of breath and asthma.  She recently had an x-ray of her chest which did show some over inflation of the lungs.  She will be reaching out to her primary care physician to review these findings.    Patient describes the pain as achiness, soreness, stiffness, tightness, dull, and nagging, and rates the current pain 5 out of 10 (10 being the worst).     9/20/23: Naomi presents today with L sided hip/sacrum pain. She complains of sharp localized pain at her L SIJ. She continues to exercise and notes the most pain while performing step downs, she will be more aware of her form and to contract her gluteal muscles to help strengthen her muscles. No additional injuries or changes in her medical history.      6/8/22 Dr. Sherly Soares (PCP) who is encouraging physical therapy for balance due to all of her recent falls. We discussed this and how it is supported by me. She will let me know if she needs me to put in a specific order for balance training. She is waiting to initiate this however until after her consultation on 6/24/22 with Dr. Rich Rincon (Ortho). She is unsure what options she has regarding the right knee but has become more frustrated. She was working in her yard and on Saturday felt a significant pain again in the right lower leg from the medial knee. Pain was so bad she pulled a crutch out so that she could ambulate around her home more easily. She is hoping to get some answers as it seems that the meniscus which was surgically  "treated was not the primary source or pain generator.     She also explains that she has attempted to go kayaking 2 more times since her last encounter. Both times she felt the right sciatic and glue pain get substantially worse. The most recent episode she felt as if she was able to kayak a little bit longer, but when the pain came and hit her very intensely. She was attempting to lay flat in the kayak to avoid putting pressure on the right glutes due to pain. We will continue to focus on the right SI and sacrum and treat secondary tightness in the right lower back neck and upper back.     Naomi is not using a crutch for ambulation today.      She got the MRI of her head which was negative. It just showed some typical degenerative changes for her age.     R knee (arthroscopic meniscus) surgery for 12/7/21 with Dr. Larkin.     09/10/21: Patient reports that her neck continues to be improved even though she is still having stiffness and periods where it feels like it is \"catching\". Overall the sensation has been less since initiating care. Additionally, she presents today with a new complaint of right sciatic pain. This started approximately 1 week ago and she noticed that it is significantly affecting her ability to sit and kayak. The pain starts in the middle of the R buttock and will radiate down the entire posterior right leg into the calf and foot.     Additional info: She has recently seen and ortho and had MRI of her R knee. Confirmed meniscus injury. Saw Dr. Lujan on 8/5 who also suggested surgery. She is being refereed to Dr. Rincon for a surgical consultation. However, at this time Naomi is not comfortable with surgery and feels as if she can prolong the need for this for a while. She may cancel her consultation with Dr. Rincon until she is ready to commit to surgical procedure.    Review of Systems   Constitutional:  Negative for chills and fever.   HENT:  Negative for congestion and sneezing.    Eyes:  " Negative for visual disturbance.   Respiratory:  Negative for shortness of breath.         +Asthma (managed with inhalers)   Cardiovascular:  Negative for chest pain.   Gastrointestinal:  Negative for abdominal pain and nausea.   Endocrine: Negative for polyuria.   Genitourinary:  Negative for difficulty urinating.   Musculoskeletal:  Positive for arthralgias, back pain, myalgias, neck pain and neck stiffness.        Takes Cymbalta and Rx Flexeril for pain  +OA of knee (past surgery)   Neurological:  Negative for facial asymmetry.   Psychiatric/Behavioral:  Negative for agitation and confusion.         +Anxiety/Depression     Objective   Observation : normal gait, forward head posture, and hyperkyphosis    Physical Exam    Examination findings (palpation & ROM): Tenderness, hypertonicity and trigger points palpated throughout the left greater than right upper trapezius, left greater than right levator scapula, bilateral cervical/thoracic paraspinals, suboccipitals, left glutes, left piriformis, left greater than right lumbar paraspinals.        Segmental joint dysfunction was identified in the following areas using motion palpation and/or pain provocation assessment:  Cervical: C0-C6 (manular traction and joint mobilizations)  Thoracic: T4-T9 (gentle prone P-A)  Lumbopelvic: R SIK and L4-L5 (side-lying traction and end range stretching/mobilizations).     Today's treatment:  Performed spinal manipulation to the regions of segmental dysfunction identified on examination using age-appropriate and injury specific force, and manual diversified technique.     Brief supine IC/MFR to B UT, lev scap and cervical paraspinals. Side-lying IASTM, IC to the L SIJ, L gluteal and L piriformis. Passive L cross over leg stretch.         Treatment Plan:   The patient and I discussed the risks and benefits of chiropractic care. Based on the patient's subjective complaints along with the examination findings, it is advised that a  course of chiropractic treatment by initiated. Consent for care was given both written and orally by the patient. The patient tolerated today's treatment with little or no additional discomfort and was instructed to contact the office for questions or concerns.     Treatment Frequency: Will see/treat patient every 2-4 weeks as therapeutic benefit is sustained, then frequency will be reduced to as needed for symptom management or as needed for acute flare-ups/exacerbation.     Please note: Voice-to-text software was used when completing this note.  While the note was proofread, portions may include grammatical errors.  Please contact me with any questions/concerns as it relates to these types of errors.

## 2023-10-10 ENCOUNTER — ALLIED HEALTH (OUTPATIENT)
Dept: INTEGRATIVE MEDICINE | Facility: CLINIC | Age: 68
End: 2023-10-10
Payer: MEDICARE

## 2023-10-10 DIAGNOSIS — M54.2 NECK PAIN: ICD-10-CM

## 2023-10-10 DIAGNOSIS — M99.00 SEGMENTAL AND SOMATIC DYSFUNCTION OF HEAD REGION: ICD-10-CM

## 2023-10-10 DIAGNOSIS — M99.01 SEGMENTAL AND SOMATIC DYSFUNCTION OF CERVICAL REGION: ICD-10-CM

## 2023-10-10 DIAGNOSIS — G89.29 CHRONIC BILATERAL THORACIC BACK PAIN: ICD-10-CM

## 2023-10-10 DIAGNOSIS — M99.05 SEGMENTAL AND SOMATIC DYSFUNCTION OF PELVIC REGION: ICD-10-CM

## 2023-10-10 DIAGNOSIS — M53.3 SACRAL BACK PAIN: ICD-10-CM

## 2023-10-10 DIAGNOSIS — M99.04 SEGMENTAL AND SOMATIC DYSFUNCTION OF SACRAL REGION: Primary | ICD-10-CM

## 2023-10-10 DIAGNOSIS — M79.10 MYALGIA: ICD-10-CM

## 2023-10-10 DIAGNOSIS — M54.6 CHRONIC BILATERAL THORACIC BACK PAIN: ICD-10-CM

## 2023-10-10 DIAGNOSIS — M99.02 SEGMENTAL AND SOMATIC DYSFUNCTION OF THORACIC REGION: ICD-10-CM

## 2023-10-10 DIAGNOSIS — M79.9 POSTURAL STRAIN: ICD-10-CM

## 2023-10-10 DIAGNOSIS — M99.03 SEGMENTAL AND SOMATIC DYSFUNCTION OF LUMBAR REGION: ICD-10-CM

## 2023-10-10 PROCEDURE — 98942 CHIROPRACTIC MANJ 5 REGIONS: CPT | Performed by: CHIROPRACTOR

## 2023-10-11 DIAGNOSIS — J45.909 UNSPECIFIED ASTHMA, UNCOMPLICATED (HHS-HCC): ICD-10-CM

## 2023-10-11 RX ORDER — CEPHALEXIN 250 MG/1
CAPSULE ORAL
Qty: 60 EACH | Refills: 2 | Status: SHIPPED | OUTPATIENT
Start: 2023-10-11

## 2023-10-12 ENCOUNTER — TELEPHONE (OUTPATIENT)
Dept: PRIMARY CARE | Facility: CLINIC | Age: 68
End: 2023-10-12
Payer: COMMERCIAL

## 2023-10-12 NOTE — TELEPHONE ENCOUNTER
Pt called to get message for [employer] order chest xray, the doctor is out on medical leave til December so know one can look at it.  Pt stated show hyper inflated lungs. Didn't know if she need to do anything about it.    Please call pt at [phone]

## 2023-10-16 ENCOUNTER — DOCUMENTATION (OUTPATIENT)
Dept: INTEGRATIVE MEDICINE | Facility: CLINIC | Age: 68
End: 2023-10-16
Payer: COMMERCIAL

## 2023-10-17 ENCOUNTER — CLINICAL SUPPORT (OUTPATIENT)
Dept: PRIMARY CARE | Facility: CLINIC | Age: 68
End: 2023-10-17
Payer: MEDICARE

## 2023-10-17 ENCOUNTER — APPOINTMENT (OUTPATIENT)
Dept: PRIMARY CARE | Facility: CLINIC | Age: 68
End: 2023-10-17
Payer: MEDICARE

## 2023-10-17 DIAGNOSIS — E53.8 VITAMIN B12 DEFICIENCY: Primary | ICD-10-CM

## 2023-10-17 RX ORDER — CYANOCOBALAMIN 1000 UG/ML
1000 INJECTION, SOLUTION INTRAMUSCULAR; SUBCUTANEOUS ONCE
Status: COMPLETED | OUTPATIENT
Start: 2023-10-17 | End: 2023-10-19

## 2023-10-19 ENCOUNTER — ALLIED HEALTH (OUTPATIENT)
Dept: INTEGRATIVE MEDICINE | Facility: CLINIC | Age: 68
End: 2023-10-19
Payer: MEDICARE

## 2023-10-19 DIAGNOSIS — M54.42 ACUTE LEFT-SIDED LOW BACK PAIN WITH LEFT-SIDED SCIATICA: ICD-10-CM

## 2023-10-19 DIAGNOSIS — M99.03 SEGMENTAL AND SOMATIC DYSFUNCTION OF LUMBAR REGION: Primary | ICD-10-CM

## 2023-10-19 DIAGNOSIS — M99.04 SEGMENTAL AND SOMATIC DYSFUNCTION OF SACRAL REGION: ICD-10-CM

## 2023-10-19 DIAGNOSIS — M79.10 MYALGIA: ICD-10-CM

## 2023-10-19 DIAGNOSIS — M79.9 POSTURAL STRAIN: ICD-10-CM

## 2023-10-19 DIAGNOSIS — M99.05 SEGMENTAL AND SOMATIC DYSFUNCTION OF PELVIC REGION: ICD-10-CM

## 2023-10-19 DIAGNOSIS — M99.01 SEGMENTAL AND SOMATIC DYSFUNCTION OF CERVICAL REGION: ICD-10-CM

## 2023-10-19 DIAGNOSIS — M99.00 SEGMENTAL AND SOMATIC DYSFUNCTION OF HEAD REGION: ICD-10-CM

## 2023-10-19 DIAGNOSIS — M53.3 SACRAL BACK PAIN: ICD-10-CM

## 2023-10-19 PROCEDURE — 98942 CHIROPRACTIC MANJ 5 REGIONS: CPT | Performed by: CHIROPRACTOR

## 2023-10-19 PROCEDURE — 96372 THER/PROPH/DIAG INJ SC/IM: CPT | Performed by: INTERNAL MEDICINE

## 2023-10-19 RX ADMIN — CYANOCOBALAMIN 1000 MCG: 1000 INJECTION, SOLUTION INTRAMUSCULAR; SUBCUTANEOUS at 07:59

## 2023-10-19 ASSESSMENT — ENCOUNTER SYMPTOMS
DIFFICULTY URINATING: 0
SHORTNESS OF BREATH: 0
NECK STIFFNESS: 1
CONFUSION: 0
MYALGIAS: 1
ABDOMINAL PAIN: 0
BACK PAIN: 1
ARTHRALGIAS: 1
FEVER: 0
CHILLS: 0
NAUSEA: 0
FACIAL ASYMMETRY: 0
NECK PAIN: 1
AGITATION: 0

## 2023-10-19 NOTE — PROGRESS NOTES
Subjective   Patient ID: Naomi Dunaway is a 68 y.o. female who presents today for the treatment of pain involving their neck, upper back and L sacral pains.    This is visit 14 of the calendar year. Medicare.     Fall risk: None. No falls in the last 6 months.     HPI - Naomi presents today with acute left lower back pain. This past weekend she traveled to [place] which included a lot of walking, she drove back on Sunday but didn't have notice any pain until she woke up on Monday with severe back pain. Since Monday, she finds sitting and movement to worsen her pain as laying down is most comfortable for her. She describes her pain as a burning sensation starting in her low back radiating into the inside of her left knee. Naomi took Flexeril last night as it helped relieve some of her low back pain. No changes in her medical history.     Patient describes the pain as soreness, tingling, and nagging, and rates the current pain 9 out of 10 (10 being the worst).     Last treatment 10/10/23: Today she like to continue to treat her chronically tight neck, upper back and left hip and sacral pains.  She has been managing quite well and is staying active.  However she is having some difficulty with her shortness of breath and asthma.  She recently had an x-ray of her chest which did show some over inflation of the lungs.  She will be reaching out to her primary care physician to review these findings.     6/8/22 Dr. Sherly Soares (PCP) who is encouraging physical therapy for balance due to all of her recent falls. We discussed this and how it is supported by me. She will let me know if she needs me to put in a specific order for balance training. She is waiting to initiate this however until after her consultation on 6/24/22 with Dr. Rich Rincon (Ortho). She is unsure what options she has regarding the right knee but has become more frustrated. She was working in her yard and on Saturday felt a significant pain again in the  "right lower leg from the medial knee. Pain was so bad she pulled a crutch out so that she could ambulate around her home more easily. She is hoping to get some answers as it seems that the meniscus which was surgically treated was not the primary source or pain generator.     She also explains that she has attempted to go kayaking 2 more times since her last encounter. Both times she felt the right sciatic and glue pain get substantially worse. The most recent episode she felt as if she was able to kayak a little bit longer, but when the pain came and hit her very intensely. She was attempting to lay flat in the kayak to avoid putting pressure on the right glutes due to pain. We will continue to focus on the right SI and sacrum and treat secondary tightness in the right lower back neck and upper back.     Naomi is not using a crutch for ambulation today.      She got the MRI of her head which was negative. It just showed some typical degenerative changes for her age.     R knee (arthroscopic meniscus) surgery for 12/7/21 with Dr. Larkin.     09/10/21: Patient reports that her neck continues to be improved even though she is still having stiffness and periods where it feels like it is \"catching\". Overall the sensation has been less since initiating care. Additionally, she presents today with a new complaint of right sciatic pain. This started approximately 1 week ago and she noticed that it is significantly affecting her ability to sit and kayak. The pain starts in the middle of the R buttock and will radiate down the entire posterior right leg into the calf and foot.     Additional info: She has recently seen and ortho and had MRI of her R knee. Confirmed meniscus injury. Saw Dr. Lujan on 8/5 who also suggested surgery. She is being refereed to Dr. Rincon for a surgical consultation. However, at this time Naomi is not comfortable with surgery and feels as if she can prolong the need for this for a while. She may cancel " her consultation with Dr. Rincon until she is ready to commit to surgical procedure.    Review of Systems   Constitutional:  Negative for chills and fever.   HENT:  Negative for congestion and sneezing.    Eyes:  Negative for visual disturbance.   Respiratory:  Negative for shortness of breath.         +Asthma (managed with inhalers)   Cardiovascular:  Negative for chest pain.   Gastrointestinal:  Negative for abdominal pain and nausea.   Endocrine: Negative for polyuria.   Genitourinary:  Negative for difficulty urinating.   Musculoskeletal:  Positive for arthralgias, back pain, myalgias, neck pain and neck stiffness.        Takes Cymbalta and Rx Flexeril for pain  +OA of knee (past surgery)   Neurological:  Negative for facial asymmetry.   Psychiatric/Behavioral:  Negative for agitation and confusion.         +Anxiety/Depression     Objective   Observation : normal gait, forward head posture, and hyperkyphosis    Physical Exam  Neurological:      Mental Status: She is alert and oriented to person, place, and time.      Motor: Motor function is intact.      Coordination: Coordination is intact.      Deep Tendon Reflexes:      Reflex Scores:       Patellar reflexes are 3+ on the right side and 3+ on the left side.       Achilles reflexes are 3+ on the right side and 3+ on the left side.    Examination findings (palpation & ROM): Tenderness and hypertonicity over the L4-L5 junction and iliolumbar ligament on the left side.     Segmental joint dysfunction was identified in the following areas using motion palpation and/or pain provocation assessment:  Cervical: C0-C6 (manular traction and joint mobilizations)  Lumbopelvic: L SIJ and L4-L5 (side-lying traction and end range stretching/mobilizations).     Today's treatment:  Performed spinal manipulation to the regions of segmental dysfunction identified on examination using age-appropriate and injury specific force, and manual diversified technique.     Brief teri  modified supine IC/MFR to B UT, lev scap and cervical paraspinals. Side-lying IC to the L SIJ, L gluteal and L piriformis. Performed dry needling to the region of the chief complaint to the hypertonic muscles identified upon palpation, including L SIJ, L lumbar paraspinals, L upper gluteals (8 in, 8 out).          Treatment Plan:   The patient and I discussed the risks and benefits of chiropractic care. Based on the patient's subjective complaints along with the examination findings, it is advised that a course of chiropractic treatment by initiated. Consent for care was given both written and orally by the patient. The patient tolerated today's treatment with little or no additional discomfort and was instructed to contact the office for questions or concerns.     Treatment Frequency: Will see/treat patient every 2-4 weeks as therapeutic benefit is sustained, then frequency will be reduced to as needed for symptom management or as needed for acute flare-ups/exacerbation.     Please note: Voice-to-text software was used when completing this note.  While the note was proofread, portions may include grammatical errors.  Please contact me with any questions/concerns as it relates to these types of errors.

## 2023-10-31 ENCOUNTER — CLINICAL SUPPORT (OUTPATIENT)
Dept: PRIMARY CARE | Facility: CLINIC | Age: 68
End: 2023-10-31
Payer: MEDICARE

## 2023-10-31 DIAGNOSIS — E53.8 VITAMIN B12 DEFICIENCY: ICD-10-CM

## 2023-10-31 PROCEDURE — 96372 THER/PROPH/DIAG INJ SC/IM: CPT | Performed by: INTERNAL MEDICINE

## 2023-10-31 RX ORDER — CYANOCOBALAMIN 1000 UG/ML
1000 INJECTION, SOLUTION INTRAMUSCULAR; SUBCUTANEOUS
Status: SHIPPED | OUTPATIENT
Start: 2023-11-01

## 2023-10-31 RX ADMIN — CYANOCOBALAMIN 1000 MCG: 1000 INJECTION, SOLUTION INTRAMUSCULAR; SUBCUTANEOUS at 13:53

## 2023-10-31 ASSESSMENT — ENCOUNTER SYMPTOMS
ARTHRALGIAS: 1
SHORTNESS OF BREATH: 0
FACIAL ASYMMETRY: 0
NAUSEA: 0
BACK PAIN: 1
NECK STIFFNESS: 1
MYALGIAS: 1
CONFUSION: 0
ABDOMINAL PAIN: 0
DIFFICULTY URINATING: 0
NECK PAIN: 1
AGITATION: 0
CHILLS: 0
FEVER: 0

## 2023-10-31 NOTE — PROGRESS NOTES
Subjective   Patient ID: Naomi Dunaway is a 68 y.o. female who presents today for the treatment of pain involving their neck, upper back and L sacral pains.    This is visit 15 of the calendar year. Medicare.     Fall risk: None. No falls in the last 6 months.     HPI - Naomi presents today feeling much better since our last encounter with little to no limitations and/or pain. She has been able to perform daily activities with no complications. She would like to do a general scan of her body and check for any joint restrictions and/or muscular imbalances. No additional injuries or changes in her medical history.    Patient describes the pain as soreness, tingling, and nagging, and rates the current pain 9 out of 10 (10 being the worst).     Last treatment 10/19/23: Naomi presents today with acute left lower back pain. This past weekend she traveled to [place] which included a lot of walking, she drove back on Sunday but didn't have notice any pain until she woke up on Monday with severe back pain. Since Monday, she finds sitting and movement to worsen her pain as laying down is most comfortable for her. She describes her pain as a burning sensation starting in her low back radiating into the inside of her left knee. Naomi took Flexeril last night as it helped relieve some of her low back pain. No changes in her medical history.     6/8/22 Dr. Sherly Soares (PCP) who is encouraging physical therapy for balance due to all of her recent falls. We discussed this and how it is supported by me. She will let me know if she needs me to put in a specific order for balance training. She is waiting to initiate this however until after her consultation on 6/24/22 with Dr. Rich Rincon (Ortho). She is unsure what options she has regarding the right knee but has become more frustrated. She was working in her yard and on Saturday felt a significant pain again in the right lower leg from the medial knee. Pain was so bad she pulled a  "crutch out so that she could ambulate around her home more easily. She is hoping to get some answers as it seems that the meniscus which was surgically treated was not the primary source or pain generator.     She also explains that she has attempted to go kayaking 2 more times since her last encounter. Both times she felt the right sciatic and glue pain get substantially worse. The most recent episode she felt as if she was able to kayak a little bit longer, but when the pain came and hit her very intensely. She was attempting to lay flat in the kayak to avoid putting pressure on the right glutes due to pain. We will continue to focus on the right SI and sacrum and treat secondary tightness in the right lower back neck and upper back.     Naomi is not using a crutch for ambulation today.      She got the MRI of her head which was negative. It just showed some typical degenerative changes for her age.     R knee (arthroscopic meniscus) surgery for 12/7/21 with Dr. Larkin.     09/10/21: Patient reports that her neck continues to be improved even though she is still having stiffness and periods where it feels like it is \"catching\". Overall the sensation has been less since initiating care. Additionally, she presents today with a new complaint of right sciatic pain. This started approximately 1 week ago and she noticed that it is significantly affecting her ability to sit and kayak. The pain starts in the middle of the R buttock and will radiate down the entire posterior right leg into the calf and foot.     Additional info: She has recently seen and ortho and had MRI of her R knee. Confirmed meniscus injury. Saw Dr. Lujan on 8/5 who also suggested surgery. She is being refereed to Dr. Rincon for a surgical consultation. However, at this time Naomi is not comfortable with surgery and feels as if she can prolong the need for this for a while. She may cancel her consultation with Dr. Rincon until she is ready to commit to " surgical procedure.    Review of Systems   Constitutional:  Negative for chills and fever.   HENT:  Negative for congestion and sneezing.    Eyes:  Negative for visual disturbance.   Respiratory:  Negative for shortness of breath.         +Asthma (managed with inhalers)   Cardiovascular:  Negative for chest pain.   Gastrointestinal:  Negative for abdominal pain and nausea.   Endocrine: Negative for polyuria.   Genitourinary:  Negative for difficulty urinating.   Musculoskeletal:  Positive for arthralgias, back pain, myalgias, neck pain and neck stiffness.        + Takes Cymbalta and Rx Flexeril for pain, +OA of knee (past surgery)   Neurological:  Negative for facial asymmetry.   Psychiatric/Behavioral:  Negative for agitation and confusion.         +Anxiety/Depression     Objective   Observation : normal gait, forward head posture, and hyperkyphosis    Physical Exam  Examination findings (palpation & ROM): Tenderness and hypertonicity over the L4-L5 junction and iliolumbar ligament on the left side.     Segmental joint dysfunction was identified in the following areas using motion palpation and/or pain provocation assessment:  Cervical: C0-C6 (manular traction and joint mobilizations)  Thoracic: T1-T5   Lumbopelvic: L SIJ and L4-L5 (Side-lying traction and end range stretching/mobilizations).     Today's treatment:  Performed spinal manipulation to the regions of segmental dysfunction identified on examination using age-appropriate and injury specific force, and manual diversified technique.     Brief supine IC/MFR to B UT, lev scap and cervical paraspinals. Side-lying IC and IASTM to the L SIJ, L gluteal, and L piriformis.           Treatment Plan:   The patient and I discussed the risks and benefits of chiropractic care. Based on the patient's subjective complaints along with the examination findings, it is advised that a course of chiropractic treatment by initiated. Consent for care was given both written and  orally by the patient. The patient tolerated today's treatment with little or no additional discomfort and was instructed to contact the office for questions or concerns.     Treatment Frequency: Will see/treat patient every 2-4 weeks as therapeutic benefit is sustained, then frequency will be reduced to as needed for symptom management or as needed for acute flare-ups/exacerbation.     Please note: Voice-to-text software was used when completing this note.  While the note was proofread, portions may include grammatical errors.  Please contact me with any questions/concerns as it relates to these types of errors.

## 2023-11-01 ENCOUNTER — ALLIED HEALTH (OUTPATIENT)
Dept: INTEGRATIVE MEDICINE | Facility: CLINIC | Age: 68
End: 2023-11-01
Payer: MEDICARE

## 2023-11-01 DIAGNOSIS — M54.42 ACUTE LEFT-SIDED LOW BACK PAIN WITH LEFT-SIDED SCIATICA: ICD-10-CM

## 2023-11-01 DIAGNOSIS — M99.03 SEGMENTAL AND SOMATIC DYSFUNCTION OF LUMBAR REGION: ICD-10-CM

## 2023-11-01 DIAGNOSIS — M54.2 NECK PAIN: ICD-10-CM

## 2023-11-01 DIAGNOSIS — M79.9 POSTURAL STRAIN: ICD-10-CM

## 2023-11-01 DIAGNOSIS — M99.00 SEGMENTAL AND SOMATIC DYSFUNCTION OF HEAD REGION: ICD-10-CM

## 2023-11-01 DIAGNOSIS — M79.10 MYALGIA: ICD-10-CM

## 2023-11-01 DIAGNOSIS — M53.3 SACRAL BACK PAIN: ICD-10-CM

## 2023-11-01 DIAGNOSIS — M99.01 SEGMENTAL AND SOMATIC DYSFUNCTION OF CERVICAL REGION: ICD-10-CM

## 2023-11-01 DIAGNOSIS — M99.02 SEGMENTAL AND SOMATIC DYSFUNCTION OF THORACIC REGION: ICD-10-CM

## 2023-11-01 DIAGNOSIS — M99.05 SEGMENTAL AND SOMATIC DYSFUNCTION OF PELVIC REGION: ICD-10-CM

## 2023-11-01 DIAGNOSIS — M99.04 SEGMENTAL AND SOMATIC DYSFUNCTION OF SACRAL REGION: Primary | ICD-10-CM

## 2023-11-01 PROCEDURE — 98942 CHIROPRACTIC MANJ 5 REGIONS: CPT | Performed by: CHIROPRACTOR

## 2023-11-06 ENCOUNTER — LAB (OUTPATIENT)
Dept: LAB | Facility: LAB | Age: 68
End: 2023-11-06
Payer: MEDICARE

## 2023-11-06 DIAGNOSIS — E53.8 B12 DEFICIENCY: ICD-10-CM

## 2023-11-06 LAB — VIT B12 SERPL-MCNC: 499 PG/ML (ref 211–911)

## 2023-11-06 PROCEDURE — 82607 VITAMIN B-12: CPT

## 2023-11-06 PROCEDURE — 36415 COLL VENOUS BLD VENIPUNCTURE: CPT

## 2023-11-13 ENCOUNTER — TELEPHONE (OUTPATIENT)
Dept: PRIMARY CARE | Facility: CLINIC | Age: 68
End: 2023-11-13
Payer: COMMERCIAL

## 2023-11-13 RX ORDER — CYANOCOBALAMIN 1000 UG/ML
1000 INJECTION, SOLUTION INTRAMUSCULAR; SUBCUTANEOUS
Status: SHIPPED | OUTPATIENT
Start: 2023-11-14

## 2023-11-14 ENCOUNTER — CLINICAL SUPPORT (OUTPATIENT)
Dept: PRIMARY CARE | Facility: CLINIC | Age: 68
End: 2023-11-14
Payer: MEDICARE

## 2023-11-14 DIAGNOSIS — E53.8 VITAMIN B12 DEFICIENCY: ICD-10-CM

## 2023-11-14 PROCEDURE — 96372 THER/PROPH/DIAG INJ SC/IM: CPT | Performed by: INTERNAL MEDICINE

## 2023-11-14 RX ADMIN — CYANOCOBALAMIN 1000 MCG: 1000 INJECTION, SOLUTION INTRAMUSCULAR; SUBCUTANEOUS at 13:09

## 2023-11-28 ENCOUNTER — CLINICAL SUPPORT (OUTPATIENT)
Dept: PRIMARY CARE | Facility: CLINIC | Age: 68
End: 2023-11-28
Payer: MEDICARE

## 2023-11-28 DIAGNOSIS — E53.8 VITAMIN B12 DEFICIENCY: ICD-10-CM

## 2023-11-28 PROCEDURE — 96372 THER/PROPH/DIAG INJ SC/IM: CPT | Performed by: INTERNAL MEDICINE

## 2023-11-28 RX ADMIN — CYANOCOBALAMIN 1000 MCG: 1000 INJECTION, SOLUTION INTRAMUSCULAR; SUBCUTANEOUS at 10:47

## 2023-11-28 NOTE — PROGRESS NOTES
She came in for her b-12 injection and tolerated it well. She will schedule another appointment in 2 weeks.

## 2023-12-13 ENCOUNTER — OFFICE VISIT (OUTPATIENT)
Dept: PRIMARY CARE | Facility: CLINIC | Age: 68
End: 2023-12-13
Payer: MEDICARE

## 2023-12-13 VITALS
OXYGEN SATURATION: 97 % | SYSTOLIC BLOOD PRESSURE: 112 MMHG | HEART RATE: 71 BPM | BODY MASS INDEX: 20.09 KG/M2 | HEIGHT: 62 IN | WEIGHT: 109.2 LBS | DIASTOLIC BLOOD PRESSURE: 56 MMHG

## 2023-12-13 DIAGNOSIS — Z00.00 HEALTH MAINTENANCE EXAMINATION: ICD-10-CM

## 2023-12-13 DIAGNOSIS — F41.9 ANXIETY: ICD-10-CM

## 2023-12-13 DIAGNOSIS — Z13.6 SCREENING FOR CARDIOVASCULAR CONDITION: ICD-10-CM

## 2023-12-13 DIAGNOSIS — E78.2 MIXED HYPERLIPIDEMIA: ICD-10-CM

## 2023-12-13 DIAGNOSIS — K21.9 GASTROESOPHAGEAL REFLUX DISEASE, UNSPECIFIED WHETHER ESOPHAGITIS PRESENT: Primary | ICD-10-CM

## 2023-12-13 DIAGNOSIS — K59.09 CHRONIC CONSTIPATION: ICD-10-CM

## 2023-12-13 DIAGNOSIS — E53.8 VITAMIN B12 DEFICIENCY: ICD-10-CM

## 2023-12-13 DIAGNOSIS — Z00.00 HEALTHCARE MAINTENANCE: ICD-10-CM

## 2023-12-13 PROBLEM — H90.3 BILATERAL SENSORINEURAL HEARING LOSS: Status: RESOLVED | Noted: 2023-01-20 | Resolved: 2023-12-13

## 2023-12-13 PROBLEM — M23.006: Status: RESOLVED | Noted: 2021-10-04 | Resolved: 2023-12-13

## 2023-12-13 PROBLEM — G44.86 HEADACHE, CERVICOGENIC: Status: RESOLVED | Noted: 2023-01-20 | Resolved: 2023-12-13

## 2023-12-13 PROBLEM — S80.02XA CONTUSION, KNEE AND LOWER LEG, LEFT, INITIAL ENCOUNTER: Status: RESOLVED | Noted: 2023-06-13 | Resolved: 2023-12-13

## 2023-12-13 PROBLEM — F33.1 MODERATE EPISODE OF RECURRENT MAJOR DEPRESSIVE DISORDER (MULTI): Status: RESOLVED | Noted: 2023-08-24 | Resolved: 2023-12-13

## 2023-12-13 PROBLEM — B37.31 VAGINAL YEAST INFECTION: Status: RESOLVED | Noted: 2023-06-13 | Resolved: 2023-12-13

## 2023-12-13 PROBLEM — N95.2 VAGINAL ATROPHY: Status: RESOLVED | Noted: 2023-06-13 | Resolved: 2023-12-13

## 2023-12-13 PROBLEM — S80.12XA CONTUSION, KNEE AND LOWER LEG, LEFT, INITIAL ENCOUNTER: Status: RESOLVED | Noted: 2023-06-13 | Resolved: 2023-12-13

## 2023-12-13 PROBLEM — F33.9 RECURRENT MAJOR DEPRESSIVE DISORDER (CMS-HCC): Status: RESOLVED | Noted: 2023-01-20 | Resolved: 2023-12-13

## 2023-12-13 PROCEDURE — 96372 THER/PROPH/DIAG INJ SC/IM: CPT | Performed by: INTERNAL MEDICINE

## 2023-12-13 PROCEDURE — 99214 OFFICE O/P EST MOD 30 MIN: CPT | Performed by: INTERNAL MEDICINE

## 2023-12-13 PROCEDURE — 1160F RVW MEDS BY RX/DR IN RCRD: CPT | Performed by: INTERNAL MEDICINE

## 2023-12-13 PROCEDURE — 1126F AMNT PAIN NOTED NONE PRSNT: CPT | Performed by: INTERNAL MEDICINE

## 2023-12-13 PROCEDURE — 1159F MED LIST DOCD IN RCRD: CPT | Performed by: INTERNAL MEDICINE

## 2023-12-13 PROCEDURE — 1036F TOBACCO NON-USER: CPT | Performed by: INTERNAL MEDICINE

## 2023-12-13 RX ADMIN — CYANOCOBALAMIN 1000 MCG: 1000 INJECTION, SOLUTION INTRAMUSCULAR; SUBCUTANEOUS at 10:30

## 2023-12-13 ASSESSMENT — ENCOUNTER SYMPTOMS
APPETITE CHANGE: 0
SHORTNESS OF BREATH: 0
PALPITATIONS: 0
COUGH: 0
ACTIVITY CHANGE: 0

## 2023-12-13 ASSESSMENT — PATIENT HEALTH QUESTIONNAIRE - PHQ9
2. FEELING DOWN, DEPRESSED OR HOPELESS: NOT AT ALL
1. LITTLE INTEREST OR PLEASURE IN DOING THINGS: NOT AT ALL
SUM OF ALL RESPONSES TO PHQ9 QUESTIONS 1 AND 2: 0

## 2023-12-13 NOTE — PROGRESS NOTES
"Subjective   Patient ID: Naomi Dunaway is a 68 y.o. female who presents for 6 month follow up.    HPI   Patient presents today for routine 6 month follow up.     Patient is requesting B12 injection today    Patient denies any additional concerns at this time.    Mammogram 7/11/2023  Colonoscopy 9/2/2021    Review of Systems   Constitutional:  Negative for activity change and appetite change.   Respiratory:  Negative for cough and shortness of breath.    Cardiovascular:  Negative for chest pain, palpitations and leg swelling.       Objective   /56 (BP Location: Left arm, Patient Position: Sitting)   Pulse 71   Ht 1.562 m (5' 1.5\")   Wt 49.5 kg (109 lb 3.2 oz)   SpO2 97%   BMI 20.30 kg/m²     Physical Exam  Vitals and nursing note reviewed.   Cardiovascular:      Rate and Rhythm: Normal rate and regular rhythm.   Pulmonary:      Effort: Pulmonary effort is normal.      Breath sounds: Normal breath sounds.         Assessment/Plan   Problem List Items Addressed This Visit             ICD-10-CM    Anxiety F41.9     Sees psychiatry         Chronic constipation K59.09    Hyperlipidemia E78.5    GERD (gastroesophageal reflux disease) - Primary K21.9     On Pantoprazole         Relevant Orders    CBC     Other Visit Diagnoses         Codes    Health maintenance examination     Z00.00    Relevant Orders    Lipid Panel    TSH with reflex to Free T4 if abnormal    Healthcare maintenance     Z00.00    Relevant Orders    Comprehensive Metabolic Panel    Screening for cardiovascular condition     Z13.6    Relevant Orders    CT cardiac scoring wo IV contrast          Follow up with me in 6 months     "

## 2023-12-14 ENCOUNTER — TELEMEDICINE (OUTPATIENT)
Dept: BEHAVIORAL HEALTH | Facility: CLINIC | Age: 68
End: 2023-12-14
Payer: MEDICARE

## 2023-12-14 DIAGNOSIS — F33.42 RECURRENT MAJOR DEPRESSIVE DISORDER, IN FULL REMISSION (CMS-HCC): ICD-10-CM

## 2023-12-14 DIAGNOSIS — F41.9 ANXIETY: ICD-10-CM

## 2023-12-14 PROCEDURE — 99214 OFFICE O/P EST MOD 30 MIN: CPT | Performed by: PSYCHIATRY & NEUROLOGY

## 2023-12-14 RX ORDER — LORAZEPAM 0.5 MG/1
0.5 TABLET ORAL 3 TIMES DAILY PRN
Qty: 30 TABLET | Refills: 0 | Status: SHIPPED | OUTPATIENT
Start: 2023-12-14 | End: 2024-03-08 | Stop reason: SDUPTHER

## 2023-12-14 RX ORDER — LAMOTRIGINE 150 MG/1
150 TABLET ORAL 2 TIMES DAILY
Qty: 180 TABLET | Refills: 0 | Status: SHIPPED | OUTPATIENT
Start: 2023-12-14 | End: 2024-05-30 | Stop reason: SDUPTHER

## 2023-12-14 RX ORDER — FLUOXETINE HYDROCHLORIDE 40 MG/1
40 CAPSULE ORAL DAILY
Qty: 90 CAPSULE | Refills: 0 | Status: SHIPPED | OUTPATIENT
Start: 2023-12-14 | End: 2024-06-05 | Stop reason: SDUPTHER

## 2023-12-14 RX ORDER — DULOXETIN HYDROCHLORIDE 30 MG/1
30 CAPSULE, DELAYED RELEASE ORAL DAILY
Qty: 90 CAPSULE | Refills: 0 | Status: SHIPPED | OUTPATIENT
Start: 2023-12-14 | End: 2024-06-05 | Stop reason: SDUPTHER

## 2023-12-14 ASSESSMENT — ENCOUNTER SYMPTOMS
NERVOUS/ANXIOUS: 0
DYSPHORIC MOOD: 1

## 2023-12-14 NOTE — PROGRESS NOTES
"Adult Ambulatory Psychiatry Progress Note      Assessment/Plan     Impression:  Naomi Dunaway is a 68 y.o. female domiciled with , retired who presents for follow up with [facility] Depression [facility] Anxiety.    Plan:        Depression/anxiety - duloxetine 30mg daily, fluoxetine 40mg daily, lamotrigine 150mg BID, lorazepam 0.5mg TID prn panic, c/w therapy, c/w med ed, psycho ed and supportive psychotherapy, f/u 3 months         Subjective   HPI:  Pt presented on time. Mood \"good\" since last session. She had some depressed mood a few weeks ago. There was more stress which she thinks was the cause. Her  has cancer. His last evaluation was clear but the long term remission is not great. Anxiety is manageable. Sleeping ok. Appetite ok. Taking medicine as prescribed. Denies significant SE. Pt was wondering if she should do TMS again. Discussed spravato. Also discussed med adjustment. Suggested stopping fluoxetine and increasing duloxetine to 60mg. Pt will think about it.          Review of Systems   Psychiatric/Behavioral:  Positive for dysphoric mood. Negative for suicidal ideas. The patient is not nervous/anxious.            Objective   Mental Status Exam:  General Appearance: Well groomed, appropriate eye contact  Attitude/Behavior: Cooperative  Motor: No psychomotor agitation or retardation, no tremor or other abnormal movements  Speech: Normal rate, volume, prosody  Mood: \"good\"  Affect: Constricted  Thought Process: Linear, goal directed  Thought Associations: No loosening of associations  Thought Content: Normal  Perception: No perceptual abnormalities noted  Insight: Intact  Judgement: Intact    Vitals:  There were no vitals filed for this visit.    Current Medications:  Current Outpatient Medications on File Prior to Visit   Medication Sig Dispense Refill    Advair Diskus 100-50 mcg/dose diskus inhaler INHALE 1 PUFF BY MOUTH TWICE A DAY (MORNING AND EVENING ABOUT 12 HOURS APART) 60 each 2    albuterol 90 " mcg/actuation inhaler Inhale 1-2 puffs every 6 hours if needed.      BIOTIN ORAL Take by mouth. daily      CALCIUM ORAL Take 2,000 mg by mouth 1 (one) time each day. Calcium 250 mg Caps; 2000 mg daily      cholecalciferol (Vitamin D-3) 25 MCG (1000 UT) capsule Take 1 capsule (25 mcg) by mouth.      cyclobenzaprine (Flexeril) 10 mg tablet prn      folic acid 0.8 mg capsule Unsure of the strength      guaiFENesin (Mucinex) 1,200 mg tablet extended release 12hr Take 1 tablet (1,200 mg) by mouth every 12 hours.      levomefolate-algal oil 15-90.314 mg capsule Take 1 capsule by mouth 1 (one) time each day.      linaCLOtide (Linzess) 145 mcg capsule Take 1 capsule (145 mcg) by mouth once daily.      mometasone furoate, bulk, 100 % powder Compound Mometasone 1mg capsule to be added to sinus rinse twice daily.      pantoprazole (ProtoNix) 40 mg EC tablet Take 1 tablet (40 mg) by mouth once daily.      [DISCONTINUED] cyanocobalamin (Vitamin B-12) 250 mcg tablet Take 1 tablet (250 mcg) by mouth.      [DISCONTINUED] DULoxetine (Cymbalta) 30 mg DR capsule Take 1 capsule (30 mg) by mouth once daily.      [DISCONTINUED] FLUoxetine (PROzac) 40 mg capsule Take 1 capsule (40 mg) by mouth once daily.      [DISCONTINUED] lamoTRIgine (LaMICtal) 150 mg tablet Take 2 tablets (300 mg) by mouth once daily.      [DISCONTINUED] LEVOMEFOLATE CALCIUM ORAL Take by mouth.      [DISCONTINUED] LORazepam (Ativan) 0.5 mg tablet Take 1 tablet (0.5 mg) by mouth 3 times a day as needed.      [DISCONTINUED] magnesium 200 mg tablet Take by mouth.       Current Facility-Administered Medications on File Prior to Visit   Medication Dose Route Frequency Provider Last Rate Last Admin    cyanocobalamin (Vitamin B-12) injection 1,000 mcg  1,000 mcg intramuscular q7 days Sherly Soares MD   1,000 mcg at 10/03/23 1200    cyanocobalamin (Vitamin B-12) injection 1,000 mcg  1,000 mcg intramuscular q14 days Sherly Soares MD   1,000 mcg at 10/31/23 1353     cyanocobalamin (Vitamin B-12) injection 1,000 mcg  1,000 mcg intramuscular q14 days Sheryl Soares MD   1,000 mcg at 12/13/23 1030       Orders:  Diagnoses and all orders for this visit:  Recurrent major depressive disorder, in full remission (CMS/HCC)  -     DULoxetine (Cymbalta) 30 mg DR capsule; Take 1 capsule (30 mg) by mouth once daily.  -     FLUoxetine (PROzac) 40 mg capsule; Take 1 capsule (40 mg) by mouth once daily.  -     lamoTRIgine (LaMICtal) 150 mg tablet; Take 1 tablet (150 mg) by mouth 2 times a day.  Anxiety  -     DULoxetine (Cymbalta) 30 mg DR capsule; Take 1 capsule (30 mg) by mouth once daily.  -     FLUoxetine (PROzac) 40 mg capsule; Take 1 capsule (40 mg) by mouth once daily.  -     LORazepam (Ativan) 0.5 mg tablet; Take 1 tablet (0.5 mg) by mouth 3 times a day as needed for anxiety for up to 10 days.          Time Spent:    Prep time: 2  Direct patient time: 29  Documentation time: 5  Total time: 36 min    Next Appointment:  No follow-ups on file.

## 2023-12-15 ENCOUNTER — ALLIED HEALTH (OUTPATIENT)
Dept: INTEGRATIVE MEDICINE | Facility: CLINIC | Age: 68
End: 2023-12-15
Payer: MEDICARE

## 2023-12-15 DIAGNOSIS — M99.01 SEGMENTAL AND SOMATIC DYSFUNCTION OF CERVICAL REGION: ICD-10-CM

## 2023-12-15 DIAGNOSIS — M54.6 CHRONIC BILATERAL THORACIC BACK PAIN: ICD-10-CM

## 2023-12-15 DIAGNOSIS — M53.3 SACRAL BACK PAIN: ICD-10-CM

## 2023-12-15 DIAGNOSIS — M99.02 SEGMENTAL AND SOMATIC DYSFUNCTION OF THORACIC REGION: ICD-10-CM

## 2023-12-15 DIAGNOSIS — M99.00 SEGMENTAL AND SOMATIC DYSFUNCTION OF HEAD REGION: ICD-10-CM

## 2023-12-15 DIAGNOSIS — M99.04 SEGMENTAL AND SOMATIC DYSFUNCTION OF SACRAL REGION: Primary | ICD-10-CM

## 2023-12-15 DIAGNOSIS — M79.9 POSTURAL STRAIN: ICD-10-CM

## 2023-12-15 DIAGNOSIS — G89.29 CHRONIC BILATERAL THORACIC BACK PAIN: ICD-10-CM

## 2023-12-15 DIAGNOSIS — M99.03 SEGMENTAL AND SOMATIC DYSFUNCTION OF LUMBAR REGION: ICD-10-CM

## 2023-12-15 DIAGNOSIS — M54.42 ACUTE LEFT-SIDED LOW BACK PAIN WITH LEFT-SIDED SCIATICA: ICD-10-CM

## 2023-12-15 DIAGNOSIS — M79.10 MYALGIA: ICD-10-CM

## 2023-12-15 DIAGNOSIS — M54.2 NECK PAIN: ICD-10-CM

## 2023-12-15 DIAGNOSIS — M99.05 SEGMENTAL AND SOMATIC DYSFUNCTION OF PELVIC REGION: ICD-10-CM

## 2023-12-15 PROCEDURE — 98942 CHIROPRACTIC MANJ 5 REGIONS: CPT | Performed by: CHIROPRACTOR

## 2023-12-15 ASSESSMENT — ENCOUNTER SYMPTOMS
NECK STIFFNESS: 1
SHORTNESS OF BREATH: 0
FACIAL ASYMMETRY: 0
AGITATION: 0
BACK PAIN: 1
CHILLS: 0
DIFFICULTY URINATING: 0
FEVER: 0
NAUSEA: 0
MYALGIAS: 1
ABDOMINAL PAIN: 0
CONFUSION: 0
NECK PAIN: 1
ARTHRALGIAS: 1

## 2023-12-15 NOTE — PROGRESS NOTES
Subjective   Patient ID: Naomi Dunaway is a 68 y.o. female who presents today for the treatment of pain involving their neck, upper back and L sacral pains.    This is visit 16 of the calendar year. Medicare.     Fall risk: None. No falls in the last 6 months.     HPI -this week she was doing little more walking and started to develop some left hip and sacral pain.  Not quite an acute exacerbation but more noticeable than usual.  She also tends to hold a lot of tension and stress in her neck upper back and shoulders with the upcoming holiday season this has been slightly more noticeable.    Patient describes the pain as soreness, tingling, and nagging, and rates the current pain 6 out of 10 (10 being the worst).     Last treatment 11/1/23: Naomi presents today feeling much better since our last encounter with little to no limitations and/or pain. She has been able to perform daily activities with no complications. She would like to do a general scan of her body and check for any joint restrictions and/or muscular imbalances. No additional injuries or changes in her medical history.    10/19/23: Naomi presents today with acute left lower back pain. This past weekend she traveled to [place] which included a lot of walking, she drove back on Sunday but didn't have notice any pain until she woke up on Monday with severe back pain. Since Monday, she finds sitting and movement to worsen her pain as laying down is most comfortable for her. She describes her pain as a burning sensation starting in her low back radiating into the inside of her left knee. Naomi took Flexeril last night as it helped relieve some of her low back pain. No changes in her medical history.     6/8/22 Dr. Sherly Soares (PCP) who is encouraging physical therapy for balance due to all of her recent falls. We discussed this and how it is supported by me. She will let me know if she needs me to put in a specific order for balance training. She is  "waiting to initiate this however until after her consultation on 6/24/22 with Dr. Rich Rincon (Ortho). She is unsure what options she has regarding the right knee but has become more frustrated. She was working in her yard and on Saturday felt a significant pain again in the right lower leg from the medial knee. Pain was so bad she pulled a crutch out so that she could ambulate around her home more easily. She is hoping to get some answers as it seems that the meniscus which was surgically treated was not the primary source or pain generator.     She also explains that she has attempted to go kayaking 2 more times since her last encounter. Both times she felt the right sciatic and glue pain get substantially worse. The most recent episode she felt as if she was able to kayak a little bit longer, but when the pain came and hit her very intensely. She was attempting to lay flat in the kayak to avoid putting pressure on the right glutes due to pain. We will continue to focus on the right SI and sacrum and treat secondary tightness in the right lower back neck and upper back.     Naomi is not using a crutch for ambulation today.      She got the MRI of her head which was negative. It just showed some typical degenerative changes for her age.     R knee (arthroscopic meniscus) surgery for 12/7/21 with Dr. Larkin.     09/10/21: Patient reports that her neck continues to be improved even though she is still having stiffness and periods where it feels like it is \"catching\". Overall the sensation has been less since initiating care. Additionally, she presents today with a new complaint of right sciatic pain. This started approximately 1 week ago and she noticed that it is significantly affecting her ability to sit and kayak. The pain starts in the middle of the R buttock and will radiate down the entire posterior right leg into the calf and foot.     Additional info: She has recently seen and ortho and had MRI of her R knee. " Confirmed meniscus injury. Saw Dr. Lujan on 8/5 who also suggested surgery. She is being refereed to Dr. Rincon for a surgical consultation. However, at this time Naomi is not comfortable with surgery and feels as if she can prolong the need for this for a while. She may cancel her consultation with Dr. Rincon until she is ready to commit to surgical procedure.    Review of Systems   Constitutional:  Negative for chills and fever.   HENT:  Negative for congestion and sneezing.    Eyes:  Negative for visual disturbance.   Respiratory:  Negative for shortness of breath.         +Asthma (managed with inhalers)   Cardiovascular:  Negative for chest pain.   Gastrointestinal:  Negative for abdominal pain and nausea.   Endocrine: Negative for polyuria.   Genitourinary:  Negative for difficulty urinating.   Musculoskeletal:  Positive for arthralgias, back pain, myalgias, neck pain and neck stiffness.        + Takes Cymbalta and Rx Flexeril for pain, +OA of knee (past surgery)   Neurological:  Negative for facial asymmetry.   Psychiatric/Behavioral:  Negative for agitation and confusion.         +Anxiety/Depression     Objective   Observation : normal gait, forward head posture, and hyperkyphosis    Physical Exam  Examination findings (palpation & ROM): Tenderness and hypertonicity over the L4-L5 junction and iliolumbar ligament on the left side.     Segmental joint dysfunction was identified in the following areas using motion palpation and/or pain provocation assessment:  Cervical: C0-C6 (manual traction and joint mobilizations)  Thoracic: T1-T5   Lumbopelvic: L SIJ and L4-L5 (Side-lying traction and end range stretching/mobilizations).     Today's treatment:  Performed spinal manipulation to the regions of segmental dysfunction identified on examination using age-appropriate and injury specific force, and manual diversified technique.     Brief supine IC/MFR to B UT, lev scap and cervical paraspinals. Side-lying IC and IASTM  to the L SIJ, L gluteal, and L piriformis.         Treatment Plan:   The patient and I discussed the risks and benefits of chiropractic care. Based on the patient's subjective complaints along with the examination findings, it is advised that a course of chiropractic treatment by initiated. Consent for care was given both written and orally by the patient. The patient tolerated today's treatment with little or no additional discomfort and was instructed to contact the office for questions or concerns.     Treatment Frequency: Will see/treat patient every 2-4 weeks as therapeutic benefit is sustained, then frequency will be reduced to as needed for symptom management or as needed for acute flare-ups/exacerbation.     Please note: Voice-to-text software was used when completing this note.  While the note was proofread, portions may include grammatical errors.  Please contact me with any questions/concerns as it relates to these types of errors.

## 2023-12-27 ENCOUNTER — CLINICAL SUPPORT (OUTPATIENT)
Dept: PRIMARY CARE | Facility: CLINIC | Age: 68
End: 2023-12-27
Payer: MEDICARE

## 2023-12-27 DIAGNOSIS — E53.8 VITAMIN B12 DEFICIENCY: ICD-10-CM

## 2023-12-27 PROCEDURE — 96372 THER/PROPH/DIAG INJ SC/IM: CPT | Performed by: INTERNAL MEDICINE

## 2023-12-27 RX ADMIN — CYANOCOBALAMIN 1000 MCG: 1000 INJECTION, SOLUTION INTRAMUSCULAR; SUBCUTANEOUS at 11:36

## 2023-12-28 ENCOUNTER — ANCILLARY PROCEDURE (OUTPATIENT)
Dept: RADIOLOGY | Facility: CLINIC | Age: 68
End: 2023-12-28
Payer: MEDICARE

## 2023-12-28 DIAGNOSIS — Z13.6 SCREENING FOR CARDIOVASCULAR CONDITION: ICD-10-CM

## 2023-12-28 PROCEDURE — 75571 CT HRT W/O DYE W/CA TEST: CPT

## 2023-12-30 NOTE — RESULT ENCOUNTER NOTE
Kevin Salgado- I am covering for Dr Soares   Great news the cardiac score is zero so no hard plaque in the coronary arteries and low risk of cardiac event in the next 10 yrs   the CT of the lungs is also unremarkable for aneurysm or lung nodules

## 2024-01-02 ENCOUNTER — ALLIED HEALTH (OUTPATIENT)
Dept: INTEGRATIVE MEDICINE | Facility: CLINIC | Age: 69
End: 2024-01-02
Payer: MEDICARE

## 2024-01-02 DIAGNOSIS — M99.00 SEGMENTAL AND SOMATIC DYSFUNCTION OF HEAD REGION: ICD-10-CM

## 2024-01-02 DIAGNOSIS — M99.05 SEGMENTAL AND SOMATIC DYSFUNCTION OF PELVIC REGION: ICD-10-CM

## 2024-01-02 DIAGNOSIS — Z71.82 EXERCISE COUNSELING: ICD-10-CM

## 2024-01-02 DIAGNOSIS — M53.3 SACRAL BACK PAIN: ICD-10-CM

## 2024-01-02 DIAGNOSIS — M99.02 SEGMENTAL AND SOMATIC DYSFUNCTION OF THORACIC REGION: ICD-10-CM

## 2024-01-02 DIAGNOSIS — Z71.89 ENCOUNTER FOR HERB AND VITAMIN SUPPLEMENT MANAGEMENT: ICD-10-CM

## 2024-01-02 DIAGNOSIS — M99.01 SEGMENTAL AND SOMATIC DYSFUNCTION OF CERVICAL REGION: ICD-10-CM

## 2024-01-02 DIAGNOSIS — Z71.3 NUTRITIONAL COUNSELING: ICD-10-CM

## 2024-01-02 DIAGNOSIS — G89.29 CHRONIC BILATERAL THORACIC BACK PAIN: ICD-10-CM

## 2024-01-02 DIAGNOSIS — M79.10 MYALGIA: ICD-10-CM

## 2024-01-02 DIAGNOSIS — M99.03 SEGMENTAL AND SOMATIC DYSFUNCTION OF LUMBAR REGION: ICD-10-CM

## 2024-01-02 DIAGNOSIS — K59.09 CHRONIC CONSTIPATION: Primary | ICD-10-CM

## 2024-01-02 DIAGNOSIS — M54.2 NECK PAIN: ICD-10-CM

## 2024-01-02 DIAGNOSIS — M54.6 CHRONIC BILATERAL THORACIC BACK PAIN: ICD-10-CM

## 2024-01-02 DIAGNOSIS — M79.9 POSTURAL STRAIN: ICD-10-CM

## 2024-01-02 DIAGNOSIS — M99.04 SEGMENTAL AND SOMATIC DYSFUNCTION OF SACRAL REGION: Primary | ICD-10-CM

## 2024-01-02 PROCEDURE — 98942 CHIROPRACTIC MANJ 5 REGIONS: CPT | Performed by: CHIROPRACTOR

## 2024-01-02 PROCEDURE — 99215 OFFICE O/P EST HI 40 MIN: CPT | Performed by: PHYSICIAN ASSISTANT

## 2024-01-02 ASSESSMENT — ENCOUNTER SYMPTOMS
ABDOMINAL PAIN: 0
AGITATION: 0
NECK STIFFNESS: 1
MYALGIAS: 1
SHORTNESS OF BREATH: 0
NECK PAIN: 1
FEVER: 0
FACIAL ASYMMETRY: 0
NAUSEA: 0
ARTHRALGIAS: 1
CHILLS: 0
BACK PAIN: 1
CONFUSION: 0
DIFFICULTY URINATING: 0

## 2024-01-02 NOTE — PATIENT INSTRUCTIONS
SMART Goals on Habit Tracker:  Drink 40 oz of water daily  Yoga for 5 minutes in the late afternoon (increase as tolerated)   Take magnesium every night   Supplements:  Discontinue Quercetin Immune support  Take Quercetin x 10 days if you notice any viral symptoms   Take magnesium glycinate after dinner  Take 2000 IU Vitamin D3 daily with food   To reduce sugar cravings:  L-glutamine 500 mg three times daily between meals  Psyllium husk- two capsules once daily with plenty of water  Discontinue psyllium if you notice worsening GI symptoms  Resources:  Gut Health to Whole Health handout

## 2024-01-02 NOTE — PROGRESS NOTES
"Integrative Medicine Consult:  Naomi is a 67 yo female with PMH of anxiety, ADHD, asthma, chronic sinusitis, chronic constipation, GERD, HLD, knee pain, vit D deficiency presents for follow up. Self-referral. 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 Lives with- . Social Support- friends     Successes:  - \"Doing well\"   - Mood and energy improved with B12 injections  - CT cardiac calcium score of 0   - Going to [place] this week then [place] for a month    Challenges:  - Exercise- limited; low motivation for consistent exercise   - Mood- variable; always worsens with weather; interested in TMS monthly for preventative care  - SOB- shows up intermittently without obvious cause; has not worsened nor improved  - L hip pain- well-managed with chiropractic care   - Sugar seems to loosen BMs      Supplements:  Quercetin  Magnesium         Assessment/Plan:  Magnesium after dinner    Yoga  Gut health   Habit tracker- 40 oz water, 5 minutes yoga  L-glutamine 500 mg   Psyllium   Vitamin D 2000 units daily     ROS:  Constitutional: afebrile  Respiratory: no shortness of breath  Cardiovascular: no chest  pain  Abdominal: no abdominal pain, no n/v/d  Musculoskeletal: +L hip/LBP   Skin: no rash    Physical Exam:  General: alert and oriented; well-developed, well-nourished, no acute distress  HEENT: normocephalic, atraumatic, good eye contact  Respiratory: no labored breathing, no conversational dyspnea  Musculoskeletal: moving all extremities appropriately  Neurology: normal gait  Psych: pleasant affect, cooperative    "

## 2024-01-02 NOTE — PROGRESS NOTES
Subjective   Patient ID: Naomi Dunaway is a 68 y.o. female who presents today for the treatment of pain involving their neck, upper back and L sacral pains.    This is visit 1 of the calendar year. Medicare.     Fall risk: None. No falls in the last 6 months.     HPI - She has been hosting a lot for the holidays and has been feeing some more tension and tightness/pain in to the L>R upper back and neck. She is also leaving for a trip to [place], [place] on Saturday. Luckily, the L sacrum has not bee too bad. She is hoping that a treatment will help keep her comfortable during her travels.     Patient describes the pain as soreness, tingling, and nagging, and rates the current pain 5 out of 10 (10 being the worst).     Last treatment 12/15/23: this week she was doing little more walking and started to develop some left hip and sacral pain.  Not quite an acute exacerbation but more noticeable than usual.  She also tends to hold a lot of tension and stress in her neck upper back and shoulders with the upcoming holiday season this has been slightly more noticeable.    11/1/23: Naomi presents today feeling much better since our last encounter with little to no limitations and/or pain. She has been able to perform daily activities with no complications. She would like to do a general scan of her body and check for any joint restrictions and/or muscular imbalances. No additional injuries or changes in her medical history.    10/19/23: Naomi presents today with acute left lower back pain. This past weekend she traveled to [place] which included a lot of walking, she drove back on Sunday but didn't have notice any pain until she woke up on Monday with severe back pain. Since Monday, she finds sitting and movement to worsen her pain as laying down is most comfortable for her. She describes her pain as a burning sensation starting in her low back radiating into the inside of her left knee. Naomi took Flexeril last night as it  "helped relieve some of her low back pain. No changes in her medical history.     6/8/22 Dr. Sherly Soares (PCP) who is encouraging physical therapy for balance due to all of her recent falls. We discussed this and how it is supported by me. She will let me know if she needs me to put in a specific order for balance training. She is waiting to initiate this however until after her consultation on 6/24/22 with Dr. Rich Rincon (Ortho). She is unsure what options she has regarding the right knee but has become more frustrated. She was working in her yard and on Saturday felt a significant pain again in the right lower leg from the medial knee. Pain was so bad she pulled a crutch out so that she could ambulate around her home more easily. She is hoping to get some answers as it seems that the meniscus which was surgically treated was not the primary source or pain generator.     She also explains that she has attempted to go kayaking 2 more times since her last encounter. Both times she felt the right sciatic and glue pain get substantially worse. The most recent episode she felt as if she was able to kayak a little bit longer, but when the pain came and hit her very intensely. She was attempting to lay flat in the kayak to avoid putting pressure on the right glutes due to pain. We will continue to focus on the right SI and sacrum and treat secondary tightness in the right lower back neck and upper back.     Naomi is not using a crutch for ambulation today.      She got the MRI of her head which was negative. It just showed some typical degenerative changes for her age.     R knee (arthroscopic meniscus) surgery for 12/7/21 with Dr. Larkin.     09/10/21: Patient reports that her neck continues to be improved even though she is still having stiffness and periods where it feels like it is \"catching\". Overall the sensation has been less since initiating care. Additionally, she presents today with a new complaint of right " sciatic pain. This started approximately 1 week ago and she noticed that it is significantly affecting her ability to sit and kayak. The pain starts in the middle of the R buttock and will radiate down the entire posterior right leg into the calf and foot.     Additional info: She has recently seen and ortho and had MRI of her R knee. Confirmed meniscus injury. Saw Dr. Lujan on 8/5 who also suggested surgery. She is being refereed to Dr. Rincon for a surgical consultation. However, at this time Naomi is not comfortable with surgery and feels as if she can prolong the need for this for a while. She may cancel her consultation with Dr. Rincon until she is ready to commit to surgical procedure.    Review of Systems   Constitutional:  Negative for chills and fever.   HENT:  Negative for congestion and sneezing.    Eyes:  Negative for visual disturbance.   Respiratory:  Negative for shortness of breath.         +Asthma (managed with inhalers)   Cardiovascular:  Negative for chest pain.   Gastrointestinal:  Negative for abdominal pain and nausea.   Endocrine: Negative for polyuria.   Genitourinary:  Negative for difficulty urinating.   Musculoskeletal:  Positive for arthralgias, back pain, myalgias, neck pain and neck stiffness.        + Takes Cymbalta and Rx Flexeril for pain, +OA of knee (past surgery)   Neurological:  Negative for facial asymmetry.   Psychiatric/Behavioral:  Negative for agitation and confusion.         +Anxiety/Depression     Objective   Observation : normal gait, forward head posture, and hyperkyphosis    Physical Exam  Examination findings (palpation & ROM): Tenderness and hypertonicity over the L4-L5 junction and iliolumbar ligament on the left side.     Segmental joint dysfunction was identified in the following areas using motion palpation and/or pain provocation assessment:  Cervical: C0-C6 (manual traction and joint mobilizations)  Thoracic: T1-T5   Lumbopelvic: L SIJ and L4-L5 (Side-lying traction  and end range stretching/mobilizations).     Today's treatment:  Performed spinal manipulation to the regions of segmental dysfunction identified on examination using age-appropriate and injury specific force, and manual diversified technique.     Brief supine IC/MFR to B UT, lev scap and cervical paraspinals. Side-lying IC and IASTM to the L SIJ, L gluteal, and L piriformis.         Treatment Plan:   The patient and I discussed the risks and benefits of chiropractic care. Based on the patient's subjective complaints along with the examination findings, it is advised that a course of chiropractic treatment by initiated. Consent for care was given both written and orally by the patient. The patient tolerated today's treatment with little or no additional discomfort and was instructed to contact the office for questions or concerns.     Treatment Frequency: Will see/treat patient every 2-4 weeks as therapeutic benefit is sustained, then frequency will be reduced to as needed for symptom management or as needed for acute flare-ups/exacerbation.     Please note: Voice-to-text software was used when completing this note.  While the note was proofread, portions may include grammatical errors.  Please contact me with any questions/concerns as it relates to these types of errors.

## 2024-01-10 ENCOUNTER — CLINICAL SUPPORT (OUTPATIENT)
Dept: PRIMARY CARE | Facility: CLINIC | Age: 69
End: 2024-01-10
Payer: MEDICARE

## 2024-01-10 DIAGNOSIS — E53.8 VITAMIN B12 DEFICIENCY: ICD-10-CM

## 2024-01-10 PROCEDURE — 96372 THER/PROPH/DIAG INJ SC/IM: CPT | Performed by: INTERNAL MEDICINE

## 2024-01-10 RX ADMIN — CYANOCOBALAMIN 1000 MCG: 1000 INJECTION, SOLUTION INTRAMUSCULAR; SUBCUTANEOUS at 11:52

## 2024-01-15 ENCOUNTER — ALLIED HEALTH (OUTPATIENT)
Dept: INTEGRATIVE MEDICINE | Facility: CLINIC | Age: 69
End: 2024-01-15
Payer: MEDICARE

## 2024-01-15 DIAGNOSIS — M99.04 SEGMENTAL AND SOMATIC DYSFUNCTION OF SACRAL REGION: Primary | ICD-10-CM

## 2024-01-15 DIAGNOSIS — M99.00 SEGMENTAL AND SOMATIC DYSFUNCTION OF HEAD REGION: ICD-10-CM

## 2024-01-15 DIAGNOSIS — M54.2 NECK PAIN: ICD-10-CM

## 2024-01-15 DIAGNOSIS — M99.03 SEGMENTAL AND SOMATIC DYSFUNCTION OF LUMBAR REGION: ICD-10-CM

## 2024-01-15 DIAGNOSIS — M79.10 MYALGIA: ICD-10-CM

## 2024-01-15 DIAGNOSIS — M54.6 CHRONIC BILATERAL THORACIC BACK PAIN: ICD-10-CM

## 2024-01-15 DIAGNOSIS — M99.01 SEGMENTAL AND SOMATIC DYSFUNCTION OF CERVICAL REGION: ICD-10-CM

## 2024-01-15 DIAGNOSIS — G89.29 CHRONIC BILATERAL THORACIC BACK PAIN: ICD-10-CM

## 2024-01-15 DIAGNOSIS — M99.02 SEGMENTAL AND SOMATIC DYSFUNCTION OF THORACIC REGION: ICD-10-CM

## 2024-01-15 DIAGNOSIS — M79.9 POSTURAL STRAIN: ICD-10-CM

## 2024-01-15 DIAGNOSIS — M54.42 ACUTE LEFT-SIDED LOW BACK PAIN WITH LEFT-SIDED SCIATICA: ICD-10-CM

## 2024-01-15 DIAGNOSIS — M53.3 SACRAL BACK PAIN: ICD-10-CM

## 2024-01-15 DIAGNOSIS — M99.05 SEGMENTAL AND SOMATIC DYSFUNCTION OF PELVIC REGION: ICD-10-CM

## 2024-01-15 PROCEDURE — 98942 CHIROPRACTIC MANJ 5 REGIONS: CPT | Performed by: CHIROPRACTOR

## 2024-01-15 ASSESSMENT — ENCOUNTER SYMPTOMS
SHORTNESS OF BREATH: 0
NAUSEA: 0
ABDOMINAL PAIN: 0
NECK PAIN: 1
NECK STIFFNESS: 1
MYALGIAS: 1
FACIAL ASYMMETRY: 0
CHILLS: 0
ARTHRALGIAS: 1
DIFFICULTY URINATING: 0
AGITATION: 0
FEVER: 0
BACK PAIN: 1
CONFUSION: 0

## 2024-01-15 NOTE — PROGRESS NOTES
Subjective   Patient ID: Naomi Dunaway is a 68 y.o. female who presents today for the treatment of pain involving their neck, upper back and L sacral pains.    This is visit 2 of the 2024 calendar year. Medicare.     Fall risk: None. No falls in the last 6 months.     HPI - She is doing okay. She cancelled the trip to [place] but is looking forward to a trip to [place] for about 4-5 weeks. Still a lotof tension/tightness into the neck upper back/shoulder and L>R sacrum/glute, but no acute flare-ups.     Patient describes the pain as soreness, tingling, and nagging, and rates the current pain 5 out of 10 (10 being the worst).     Last treatment 1/2/24: She has been hosting a lot for the holidays and has been feeing some more tension and tightness/pain in to the L>R upper back and neck. She is also leaving for a trip to [place], [place] on Saturday. Luckily, the L sacrum has not bee too bad. She is hoping that a treatment will help keep her comfortable during her travels.   __________  2022:    She got the MRI of her head which was negative. It just showed some typical degenerative changes for her age.     R knee (arthroscopic meniscus) surgery for 12/7/21 with Dr. Larkin.  __________   2021:   Additional info: She has recently seen and ortho and had MRI of her R knee. Confirmed meniscus injury. Saw Dr. Lujan on 8/5 who also suggested surgery. She is being refereed to Dr. Rincon for a surgical consultation. However, at this time Naomi is not comfortable with surgery and feels as if she can prolong the need for this for a while. She may cancel her consultation with Dr. Rincon until she is ready to commit to surgical procedure.    Review of Systems   Constitutional:  Negative for chills and fever.   HENT:  Negative for congestion and sneezing.    Eyes:  Negative for visual disturbance.   Respiratory:  Negative for shortness of breath.         +Asthma (managed with inhalers)   Cardiovascular:  Negative for chest pain.   Gastrointestinal:   Negative for abdominal pain and nausea.   Endocrine: Negative for polyuria.   Genitourinary:  Negative for difficulty urinating.   Musculoskeletal:  Positive for arthralgias, back pain, myalgias, neck pain and neck stiffness.        + Takes Cymbalta and Rx Flexeril for pain, +OA of knee (past surgery)   Neurological:  Negative for facial asymmetry.   Psychiatric/Behavioral:  Negative for agitation and confusion.         +Anxiety/Depression     Objective   Observation : normal gait, forward head posture, and hyperkyphosis    Physical Exam  Examination findings (palpation & ROM): Tenderness and hypertonicity over the L4-L5 junction and iliolumbar ligament on the left side.     Segmental joint dysfunction was identified in the following areas using motion palpation and/or pain provocation assessment:  Cervical: C0-C6 (manual traction and joint mobilizations)  Thoracic: T1-T5   Lumbopelvic: L SIJ and L4-L5 (Side-lying traction and end range stretching/mobilizations).     Today's treatment:  Performed spinal manipulation to the regions of segmental dysfunction identified on examination using age-appropriate and injury specific force, and manual diversified technique.     Brief supine IC/MFR to B UT, lev scap and cervical paraspinals. Side-lying IC and IASTM to the L SIJ, L gluteal, and L piriformis.         Treatment Plan:   The patient and I discussed the risks and benefits of chiropractic care. Based on the patient's subjective complaints along with the examination findings, it is advised that a course of chiropractic treatment by initiated. Consent for care was given both written and orally by the patient. The patient tolerated today's treatment with little or no additional discomfort and was instructed to contact the office for questions or concerns.     Treatment Frequency: Will see/treat patient every 2-4 weeks as therapeutic benefit is sustained, then frequency will be reduced to as needed for symptom management  or as needed for acute flare-ups/exacerbation.     Please note: Voice-to-text software was used when completing this note.  While the note was proofread, portions may include grammatical errors.  Please contact me with any questions/concerns as it relates to these types of errors.

## 2024-01-19 ENCOUNTER — APPOINTMENT (OUTPATIENT)
Dept: INTEGRATIVE MEDICINE | Facility: CLINIC | Age: 69
End: 2024-01-19
Payer: MEDICARE

## 2024-01-23 ENCOUNTER — ALLIED HEALTH (OUTPATIENT)
Dept: INTEGRATIVE MEDICINE | Facility: CLINIC | Age: 69
End: 2024-01-23
Payer: MEDICARE

## 2024-01-23 DIAGNOSIS — M79.10 MYALGIA: ICD-10-CM

## 2024-01-23 DIAGNOSIS — M79.9 POSTURAL STRAIN: ICD-10-CM

## 2024-01-23 DIAGNOSIS — M99.05 SEGMENTAL AND SOMATIC DYSFUNCTION OF PELVIC REGION: ICD-10-CM

## 2024-01-23 DIAGNOSIS — M54.6 CHRONIC BILATERAL THORACIC BACK PAIN: ICD-10-CM

## 2024-01-23 DIAGNOSIS — M99.00 SEGMENTAL AND SOMATIC DYSFUNCTION OF HEAD REGION: ICD-10-CM

## 2024-01-23 DIAGNOSIS — M99.03 SEGMENTAL AND SOMATIC DYSFUNCTION OF LUMBAR REGION: ICD-10-CM

## 2024-01-23 DIAGNOSIS — M99.01 SEGMENTAL AND SOMATIC DYSFUNCTION OF CERVICAL REGION: ICD-10-CM

## 2024-01-23 DIAGNOSIS — M99.02 SEGMENTAL AND SOMATIC DYSFUNCTION OF THORACIC REGION: ICD-10-CM

## 2024-01-23 DIAGNOSIS — M54.42 ACUTE LEFT-SIDED LOW BACK PAIN WITH LEFT-SIDED SCIATICA: ICD-10-CM

## 2024-01-23 DIAGNOSIS — M99.04 SEGMENTAL AND SOMATIC DYSFUNCTION OF SACRAL REGION: Primary | ICD-10-CM

## 2024-01-23 DIAGNOSIS — M54.2 NECK PAIN: ICD-10-CM

## 2024-01-23 DIAGNOSIS — G89.29 CHRONIC BILATERAL THORACIC BACK PAIN: ICD-10-CM

## 2024-01-23 DIAGNOSIS — M53.3 SACRAL BACK PAIN: ICD-10-CM

## 2024-01-23 PROCEDURE — 98942 CHIROPRACTIC MANJ 5 REGIONS: CPT | Performed by: CHIROPRACTOR

## 2024-01-23 ASSESSMENT — ENCOUNTER SYMPTOMS
NECK STIFFNESS: 1
NAUSEA: 0
ABDOMINAL PAIN: 0
SHORTNESS OF BREATH: 0
FEVER: 0
MYALGIAS: 1
CONFUSION: 0
AGITATION: 0
DIFFICULTY URINATING: 0
ARTHRALGIAS: 1
CHILLS: 0
BACK PAIN: 1
FACIAL ASYMMETRY: 0
NECK PAIN: 1

## 2024-01-23 NOTE — PROGRESS NOTES
Subjective   Patient ID: Naomi Dunaway is a 68 y.o. female who presents today for the treatment of pain involving their neck, upper back and L sacral pains.    This is visit 3 of the 2024 calendar year. Medicare.     Fall risk: None. No falls in the last 6 months.     HPI -she is leaving for her road trip to [place] on Thursday/Friday depending on the weather with her  and their dog.  She would like to focus on the neck and shoulders but is also having some slight increase in symptoms over the left piriformis/gluteal/sacral region.  This tends to get worse when sitting or in a long car ride and she was nervous before she left and made this appointment.  No numbness and tingling into the leg and no debilitating pain.  She still has full capacity to walk and ambulate safely without any acute pain issues.    Patient describes the pain as soreness, tingling, and nagging, and rates the current pain 5 out of 10 (10 being the worst).     Last treatment 1/15/24: She is doing okay. She cancelled the trip to [place] but is looking forward to a trip to [place] for about 4-5 weeks. Still a lotof tension/tightness into the neck upper back/shoulder and L>R sacrum/glute, but no acute flare-ups.     1/2/24: She has been hosting a lot for the holidays and has been feeing some more tension and tightness/pain in to the L>R upper back and neck. She is also leaving for a trip to [place], [place] on Saturday. Luckily, the L sacrum has not bee too bad. She is hoping that a treatment will help keep her comfortable during her travels.   __________  2022:    She got the MRI of her head which was negative. It just showed some typical degenerative changes for her age.     R knee (arthroscopic meniscus) surgery for 12/7/21 with Dr. Larkin.  __________   2021:   Additional info: She has recently seen and ortho and had MRI of her R knee. Confirmed meniscus injury. Saw Dr. Lujan on 8/5 who also suggested surgery. She is being refereed to Dr. Rincon for a  surgical consultation. However, at this time Naomi is not comfortable with surgery and feels as if she can prolong the need for this for a while. She may cancel her consultation with Dr. Rincon until she is ready to commit to surgical procedure.    Review of Systems   Constitutional:  Negative for chills and fever.   HENT:  Negative for congestion and sneezing.    Eyes:  Negative for visual disturbance.   Respiratory:  Negative for shortness of breath.         +Asthma (managed with inhalers)   Cardiovascular:  Negative for chest pain.   Gastrointestinal:  Negative for abdominal pain and nausea.   Endocrine: Negative for polyuria.   Genitourinary:  Negative for difficulty urinating.   Musculoskeletal:  Positive for arthralgias, back pain, myalgias, neck pain and neck stiffness.        + Takes Cymbalta and Rx Flexeril for pain, +OA of knee (past surgery)   Neurological:  Negative for facial asymmetry.   Psychiatric/Behavioral:  Negative for agitation and confusion.         +Anxiety/Depression     Objective   Observation : normal gait, forward head posture, and hyperkyphosis    Physical Exam  Examination findings (palpation & ROM): Tenderness and hypertonicity over the L4-L5 junction and iliolumbar ligament on the left side.     Segmental joint dysfunction was identified in the following areas using motion palpation and/or pain provocation assessment:  Cervical: C0-C6 (manual traction and joint mobilizations)  Thoracic: T1-T5   Lumbopelvic: L SIJ and L4-L5 (Side-lying traction and end range stretching/mobilizations).     Today's treatment:  Performed spinal manipulation to the regions of segmental dysfunction identified on examination using age-appropriate and injury specific force, and manual diversified technique.     Brief supine IC/MFR to B UT, lev scap and cervical paraspinals. Side-lying IC and IASTM to the L SIJ, L gluteal, and L piriformis.         Treatment Plan:   The patient and I discussed the risks and  benefits of chiropractic care. Based on the patient's subjective complaints along with the examination findings, it is advised that a course of chiropractic treatment by initiated. Consent for care was given both written and orally by the patient. The patient tolerated today's treatment with little or no additional discomfort and was instructed to contact the office for questions or concerns.     Treatment Frequency: Will see/treat patient every 2-4 weeks as therapeutic benefit is sustained, then frequency will be reduced to as needed for symptom management or as needed for acute flare-ups/exacerbation.     Please note: Voice-to-text software was used when completing this note.  While the note was proofread, portions may include grammatical errors.  Please contact me with any questions/concerns as it relates to these types of errors.

## 2024-01-24 ENCOUNTER — CLINICAL SUPPORT (OUTPATIENT)
Dept: PRIMARY CARE | Facility: CLINIC | Age: 69
End: 2024-01-24
Payer: MEDICARE

## 2024-01-24 DIAGNOSIS — E53.8 VITAMIN B12 DEFICIENCY: ICD-10-CM

## 2024-01-24 PROCEDURE — 96372 THER/PROPH/DIAG INJ SC/IM: CPT | Performed by: INTERNAL MEDICINE

## 2024-01-24 RX ADMIN — CYANOCOBALAMIN 1000 MCG: 1000 INJECTION, SOLUTION INTRAMUSCULAR; SUBCUTANEOUS at 11:27

## 2024-03-05 ASSESSMENT — ENCOUNTER SYMPTOMS
NECK STIFFNESS: 1
NECK PAIN: 1
SHORTNESS OF BREATH: 0
CONFUSION: 0
ARTHRALGIAS: 1
NAUSEA: 0
AGITATION: 0
ABDOMINAL PAIN: 0
FACIAL ASYMMETRY: 0
FEVER: 0
CHILLS: 0
DIFFICULTY URINATING: 0
BACK PAIN: 1
MYALGIAS: 1

## 2024-03-05 NOTE — PROGRESS NOTES
Subjective   Patient ID: Naomi Dunaway is a 68 y.o. female who presents today for the treatment of pain involving their neck, upper back and L sacral pains.    This is visit 4 of the 2024 calendar year. Medicare.     Fall risk: None. No falls in the last 6 months.     HPI -she reports that she had a really great trip to [place] with her  where they stayed for a month.  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  Overall she is doing pretty good.  She did do a trial class of Pilates and really enjoyed it.  She is thinking of joining [employer] in [place] where she can do this form of exercise a little more regularly.    She like to treat her chronically tight neck upper back and shoulders as well as the left sacrum and hip.  Although the knee still has limitations and stiffness it is not actively/acutely bothering her today.    Last treatment 1/23/24: she is leaving for her road trip to [place] on Thursday/Friday depending on the weather with her  and their dog.  She would like to focus on the neck and shoulders but is also having some slight increase in symptoms over the left piriformis/gluteal/sacral region.  This tends to get worse when sitting or in a long car ride and she was nervous before she left and made this appointment.  No numbness and tingling into the leg and no debilitating pain.  She still has full capacity to walk and ambulate safely without any acute pain issues.    1/15/24: She is doing okay. She cancelled the trip to [place] but is looking forward to a trip to [place] for about 4-5 weeks. Still a lotof tension/tightness into the neck upper back/shoulder and L>R sacrum/glute, but no acute flare-ups.     1/2/24: She has been hosting a lot for the holidays and has been feeing some more tension and tightness/pain in to the L>R upper back and neck. She is also leaving for a trip to [place], [place] on Saturday. Luckily, the L  sacrum has not bee too bad. She is hoping that a treatment will help keep her comfortable during her travels.   __________  2022:    She got the MRI of her head which was negative. It just showed some typical degenerative changes for her age.     R knee (arthroscopic meniscus) surgery for 12/7/21 with Dr. Larkin.  __________   2021:   Additional info: She has recently seen and ortho and had MRI of her R knee. Confirmed meniscus injury. Saw Dr. Lujan on 8/5 who also suggested surgery. She is being refereed to Dr. Rincon for a surgical consultation. However, at this time Naomi is not comfortable with surgery and feels as if she can prolong the need for this for a while. She may cancel her consultation with Dr. Rincon until she is ready to commit to surgical procedure.    Review of Systems   Constitutional:  Negative for chills and fever.   HENT:  Negative for congestion and sneezing.    Eyes:  Negative for visual disturbance.   Respiratory:  Negative for shortness of breath.         +Asthma (managed with inhalers)   Cardiovascular:  Negative for chest pain.   Gastrointestinal:  Negative for abdominal pain and nausea.   Endocrine: Negative for polyuria.   Genitourinary:  Negative for difficulty urinating.   Musculoskeletal:  Positive for arthralgias, back pain, myalgias, neck pain and neck stiffness.        + Takes Cymbalta and Rx Flexeril for pain, +OA of knee (past surgery)   Neurological:  Negative for facial asymmetry.   Psychiatric/Behavioral:  Negative for agitation and confusion.         +Anxiety/Depression     Objective   Observation : normal gait, forward head posture, and hyperkyphosis    Physical Exam  Examination findings (palpation & ROM): Tenderness and hypertonicity over the L4-L5 junction and iliolumbar ligament on the left side.     Segmental joint dysfunction was identified in the following areas using motion palpation and/or pain provocation assessment:  Cervical: C0-C6 (manual traction and joint  mobilizations)  Thoracic: T1-T5   Lumbopelvic: L SIJ and L4-L5 (Side-lying traction and end range stretching/mobilizations).     Today's treatment:  Performed spinal manipulation to the regions of segmental dysfunction identified on examination using age-appropriate and injury specific force, and manual diversified technique.     Brief supine IC/MFR to B UT, lev scap and cervical paraspinals. Side-lying IC and IASTM to the L SIJ, L gluteal, and L piriformis.           Treatment Plan:   The patient and I discussed the risks and benefits of chiropractic care. Based on the patient's subjective complaints along with the examination findings, it is advised that a course of chiropractic treatment by initiated. Consent for care was given both written and orally by the patient. The patient tolerated today's treatment with little or no additional discomfort and was instructed to contact the office for questions or concerns.     Treatment Frequency: Will see/treat patient every 2-4 weeks as therapeutic benefit is sustained, then frequency will be reduced to as needed for symptom management or as needed for acute flare-ups/exacerbation.     Please note: Voice-to-text software was used when completing this note.  While the note was proofread, portions may include grammatical errors.  Please contact me with any questions/concerns as it relates to these types of errors.

## 2024-03-06 ENCOUNTER — ALLIED HEALTH (OUTPATIENT)
Dept: INTEGRATIVE MEDICINE | Facility: CLINIC | Age: 69
End: 2024-03-06
Payer: MEDICARE

## 2024-03-06 DIAGNOSIS — G89.29 CHRONIC BILATERAL THORACIC BACK PAIN: ICD-10-CM

## 2024-03-06 DIAGNOSIS — M79.9 POSTURAL STRAIN: ICD-10-CM

## 2024-03-06 DIAGNOSIS — M79.10 MYALGIA: ICD-10-CM

## 2024-03-06 DIAGNOSIS — M99.04 SEGMENTAL AND SOMATIC DYSFUNCTION OF SACRAL REGION: Primary | ICD-10-CM

## 2024-03-06 DIAGNOSIS — M99.02 SEGMENTAL AND SOMATIC DYSFUNCTION OF THORACIC REGION: ICD-10-CM

## 2024-03-06 DIAGNOSIS — M54.6 CHRONIC BILATERAL THORACIC BACK PAIN: ICD-10-CM

## 2024-03-06 DIAGNOSIS — M54.2 NECK PAIN: ICD-10-CM

## 2024-03-06 DIAGNOSIS — M99.01 SEGMENTAL AND SOMATIC DYSFUNCTION OF CERVICAL REGION: ICD-10-CM

## 2024-03-06 DIAGNOSIS — M99.00 SEGMENTAL AND SOMATIC DYSFUNCTION OF HEAD REGION: ICD-10-CM

## 2024-03-06 DIAGNOSIS — M99.03 SEGMENTAL AND SOMATIC DYSFUNCTION OF LUMBAR REGION: ICD-10-CM

## 2024-03-06 DIAGNOSIS — M53.3 SACRAL BACK PAIN: ICD-10-CM

## 2024-03-06 DIAGNOSIS — M99.05 SEGMENTAL AND SOMATIC DYSFUNCTION OF PELVIC REGION: ICD-10-CM

## 2024-03-06 PROCEDURE — 98942 CHIROPRACTIC MANJ 5 REGIONS: CPT | Performed by: CHIROPRACTOR

## 2024-03-08 ENCOUNTER — TELEMEDICINE (OUTPATIENT)
Dept: BEHAVIORAL HEALTH | Facility: CLINIC | Age: 69
End: 2024-03-08
Payer: MEDICARE

## 2024-03-08 DIAGNOSIS — F33.42 RECURRENT MAJOR DEPRESSIVE DISORDER, IN FULL REMISSION (CMS-HCC): ICD-10-CM

## 2024-03-08 DIAGNOSIS — F41.9 ANXIETY: ICD-10-CM

## 2024-03-08 PROCEDURE — 1126F AMNT PAIN NOTED NONE PRSNT: CPT | Performed by: PSYCHIATRY & NEUROLOGY

## 2024-03-08 PROCEDURE — 1036F TOBACCO NON-USER: CPT | Performed by: PSYCHIATRY & NEUROLOGY

## 2024-03-08 PROCEDURE — 99214 OFFICE O/P EST MOD 30 MIN: CPT | Performed by: PSYCHIATRY & NEUROLOGY

## 2024-03-08 PROCEDURE — 1157F ADVNC CARE PLAN IN RCRD: CPT | Performed by: PSYCHIATRY & NEUROLOGY

## 2024-03-08 RX ORDER — LORAZEPAM 0.5 MG/1
0.5 TABLET ORAL 3 TIMES DAILY PRN
Qty: 30 TABLET | Refills: 0 | Status: SHIPPED | OUTPATIENT
Start: 2024-03-08 | End: 2024-03-18

## 2024-03-08 ASSESSMENT — ENCOUNTER SYMPTOMS
NERVOUS/ANXIOUS: 1
DYSPHORIC MOOD: 0

## 2024-03-08 NOTE — PROGRESS NOTES
"Adult Ambulatory Psychiatry Progress Note      Assessment/Plan     Impression:  Naomi Dunaway is a 68 y.o. female domiciled with , retired who presents for follow up with [facility].    Plan:        Depression/anxiety - duloxetine 30mg daily, fluoxetine 40mg daily, lamotrigine 150mg BID, lorazepam 0.5mg TID prn panic, c/w therapy, c/w med ed, psycho ed and supportive psychotherapy, f/u 3 months         Subjective   HPI:  Pt arrived on time. They bought a condo in [place] yesterday. Mood \"anxious\". She feels a little overwhelmed by everything she has to do to get things ready. She's looking forward to downsizing and having less space to maintain. Sleeping ok. Appetite ok. Taking medicine as prescribed. Denies significant SE.           Review of Systems   Psychiatric/Behavioral:  Negative for dysphoric mood and suicidal ideas. The patient is nervous/anxious.      OARRS:  Vinicius Acharya MD on 3/8/2024 12:26 PM  I have personally reviewed the OARRS report for Naomi Dunaway. I have considered the risks of abuse, dependence, addiction and diversion    Is the patient prescribed a combination of a benzodiazepine and opioid?  No        Objective   Mental Status Exam:  General Appearance: Well groomed, appropriate eye contact  Attitude/Behavior: Cooperative  Motor: No psychomotor agitation or retardation, no tremor or other abnormal movements  Speech: Normal rate, volume, prosody  Mood: \"anxious\"  Affect: Euthymic, full-range  Thought Process: Linear, goal directed  Thought Associations: No loosening of associations  Thought Content: Normal  Perception: No perceptual abnormalities noted  Insight: Intact  Judgement: Intact    Vitals:  There were no vitals filed for this visit.    Current Medications:  Current Outpatient Medications on File Prior to Visit   Medication Sig Dispense Refill    Advair Diskus 100-50 mcg/dose diskus inhaler INHALE 1 PUFF BY MOUTH TWICE A DAY (MORNING AND EVENING " ABOUT 12 HOURS APART) 60 each 2    albuterol 90 mcg/actuation inhaler Inhale 1-2 puffs every 6 hours if needed.      BIOTIN ORAL Take by mouth. daily      CALCIUM ORAL Take 2,000 mg by mouth 1 (one) time each day. Calcium 250 mg Caps; 2000 mg daily      cholecalciferol (Vitamin D-3) 25 MCG (1000 UT) capsule Take 1 capsule (25 mcg) by mouth.      cyclobenzaprine (Flexeril) 10 mg tablet prn      DULoxetine (Cymbalta) 30 mg DR capsule Take 1 capsule (30 mg) by mouth once daily. 90 capsule 0    FLUoxetine (PROzac) 40 mg capsule Take 1 capsule (40 mg) by mouth once daily. 90 capsule 0    folic acid 0.8 mg capsule Unsure of the strength      guaiFENesin (Mucinex) 1,200 mg tablet extended release 12hr Take 1 tablet (1,200 mg) by mouth every 12 hours.      lamoTRIgine (LaMICtal) 150 mg tablet Take 1 tablet (150 mg) by mouth 2 times a day. 180 tablet 0    levomefolate-algal oil 15-90.314 mg capsule Take 1 capsule by mouth 1 (one) time each day.      linaCLOtide (Linzess) 145 mcg capsule Take 1 capsule (145 mcg) by mouth once daily.      mometasone furoate, bulk, 100 % powder Compound Mometasone 1mg capsule to be added to sinus rinse twice daily.      pantoprazole (ProtoNix) 40 mg EC tablet Take 1 tablet (40 mg) by mouth once daily.      [DISCONTINUED] LORazepam (Ativan) 0.5 mg tablet Take 1 tablet (0.5 mg) by mouth 3 times a day as needed for anxiety for up to 10 days. 30 tablet 0     Current Facility-Administered Medications on File Prior to Visit   Medication Dose Route Frequency Provider Last Rate Last Admin    cyanocobalamin (Vitamin B-12) injection 1,000 mcg  1,000 mcg intramuscular q7 days Sherly Soares MD   1,000 mcg at 10/03/23 1200    cyanocobalamin (Vitamin B-12) injection 1,000 mcg  1,000 mcg intramuscular q14 days Sherly Soares MD   1,000 mcg at 10/31/23 1353    cyanocobalamin (Vitamin B-12) injection 1,000 mcg  1,000 mcg intramuscular q14 days Sherly Soares MD   1,000 mcg at 01/24/24 1128        Orders:  Diagnoses and all orders for this visit:  Recurrent major depressive disorder, in full remission (CMS/[facility])  Anxiety  -     LORazepam (Ativan) 0.5 mg tablet; Take 1 tablet (0.5 mg) by mouth 3 times a day as needed for anxiety for up to 10 days.          Time Spent:    Prep time: 2  Direct patient time: 23  Documentation time: 5  Total time: 30 min    Next Appointment:  Follow up in 3 months (on 6/12/2024).

## 2024-03-15 ENCOUNTER — ALLIED HEALTH (OUTPATIENT)
Dept: INTEGRATIVE MEDICINE | Facility: CLINIC | Age: 69
End: 2024-03-15
Payer: MEDICARE

## 2024-03-15 DIAGNOSIS — M53.3 SACRAL BACK PAIN: ICD-10-CM

## 2024-03-15 DIAGNOSIS — M54.6 CHRONIC BILATERAL THORACIC BACK PAIN: ICD-10-CM

## 2024-03-15 DIAGNOSIS — M99.04 SEGMENTAL AND SOMATIC DYSFUNCTION OF SACRAL REGION: Primary | ICD-10-CM

## 2024-03-15 DIAGNOSIS — M79.10 MYALGIA: ICD-10-CM

## 2024-03-15 DIAGNOSIS — M99.00 SEGMENTAL AND SOMATIC DYSFUNCTION OF HEAD REGION: ICD-10-CM

## 2024-03-15 DIAGNOSIS — M99.02 SEGMENTAL AND SOMATIC DYSFUNCTION OF THORACIC REGION: ICD-10-CM

## 2024-03-15 DIAGNOSIS — G89.29 CHRONIC BILATERAL THORACIC BACK PAIN: ICD-10-CM

## 2024-03-15 DIAGNOSIS — M99.05 SEGMENTAL AND SOMATIC DYSFUNCTION OF PELVIC REGION: ICD-10-CM

## 2024-03-15 DIAGNOSIS — M79.9 POSTURAL STRAIN: ICD-10-CM

## 2024-03-15 DIAGNOSIS — M99.03 SEGMENTAL AND SOMATIC DYSFUNCTION OF LUMBAR REGION: ICD-10-CM

## 2024-03-15 DIAGNOSIS — M54.2 NECK PAIN: ICD-10-CM

## 2024-03-15 DIAGNOSIS — M99.01 SEGMENTAL AND SOMATIC DYSFUNCTION OF CERVICAL REGION: ICD-10-CM

## 2024-03-15 PROCEDURE — 98942 CHIROPRACTIC MANJ 5 REGIONS: CPT | Performed by: CHIROPRACTOR

## 2024-03-15 ASSESSMENT — ENCOUNTER SYMPTOMS
AGITATION: 0
SHORTNESS OF BREATH: 0
FEVER: 0
CHILLS: 0
DIFFICULTY URINATING: 0
CONFUSION: 0
ARTHRALGIAS: 1
MYALGIAS: 1
NECK STIFFNESS: 1
FACIAL ASYMMETRY: 0
NAUSEA: 0
BACK PAIN: 1
ABDOMINAL PAIN: 0
NECK PAIN: 1

## 2024-03-15 NOTE — PROGRESS NOTES
Past Medical History:   Diagnosis Date    Acute coronary syndrome     Allergy     Arthritis     Cardiomyopathy     CHF (congestive heart failure)     Coronary artery disease     Coronary artery disease of native artery of native heart with stable angina pectoris 2021: [facility]: Cath: LAD: Proximal stent patent. LCX: Proximal 80%. LCX: Dominant. Moderate disease. LCX: HEVER 2.75 x 18 mm. Distal dissection. HEVER 2.75 x 22 mm.     Diverticulitis     Diverticulosis     Familial hypercholesterolemia 2022    Former smoker 2022    Heart attack     Heart disease     History of myocardial infarction 2022    Hyperlipidemia     Hypertension     Hypertension 2022    ICD (implantable cardioverter-defibrillator) in place     Non-ST elevation myocardial infarction (NSTEMI) 2019       Past Surgical History:   Procedure Laterality Date    ATRIAL CARDIAC PACEMAKER INSERTION       SECTION      CORONARY STENT PLACEMENT      LEFT HEART CATHETERIZATION Left 2022    Procedure: CATHETERIZATION, HEART, LEFT;  Surgeon: Yohannes Cordova MD;  Location: [facility] CATH LAB;  Service: Cardiology;  Laterality: Left;       Review of patient's allergies indicates:   Allergen Reactions    Augmentin [amoxicillin-pot clavulanate] Swelling     Not allergic to amoxicillin just a derivative of augmentin    Lisinopril Other (See Comments)     Fluid around heart    Losartan Other (See Comments)     High potassium    Shellfish containing products Anaphylaxis     Pt.states she is allergic to SEAFOOD since the age of 13    Levaquin [levofloxacin] Other (See Comments)     Very bad joint pain    Clindamycin Palpitations     Chest pain       Current Facility-Administered Medications on File Prior to Encounter   Medication    [MAR Hold - Suspended Admission] AA 5%-D15W-electrolytes 1,000 mL    [MAR Hold - Suspended Admission] AA 5%-D15W-electrolytes 1,000 mL    [MAR Hold - Suspended Admission] AA 5%-D15W-electrolytes  Subjective   Patient ID: Naomi Dunaway is a 69 y.o. female who presents today for the treatment of pain involving their neck, upper back and L sacral pains.    This is visit 5 of the 2024 calendar year. Medicare.     Fall risk: None. No falls in the last 6 months. (She does had Hx falling in the past)    HPI -physically she is holding up okay, however she has had a lot of increased stress the last 2 weeks.  She and her  bought a new condo and are in the process of now selling their home.  She also is dealing with a cancer diagnosis of her dog and found out today she needs to take him to the emergency vet this afternoon.    We will continue to treat her historically tight and painful areas including the neck, upper back/traps and lower back/hips.  She is slowly getting back to work.     Last treatment 3/6/24: she reports that she had a really great trip to [place] with her  where they stayed for a month.  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  Overall she is doing pretty good.  She did do a trial class of Pilates and really enjoyed it.  She is thinking of joining club [employer] in [place] where she can do this form of exercise a little more regularly.    She like to treat her chronically tight neck upper back and shoulders as well as the left sacrum and hip.  Although the knee still has limitations and stiffness it is not actively/acutely bothering her today.    1/23/24: she is leaving for her road trip to [place] on Thursday/Friday depending on the weather with her  and their dog.  She would like to focus on the neck and shoulders but is also having some slight increase in symptoms over the left piriformis/gluteal/sacral region.  This tends to get worse when sitting or in a long car ride and she was nervous before she left and made this appointment.  No numbness and tingling into the leg and no debilitating pain.  She  1,000 mL    [MAR Hold - Suspended Admission] acetaminophen tablet 650 mg    [MAR Hold - Suspended Admission] albuterol nebulizer solution 2.5 mg    [MAR Hold - Suspended Admission] albuterol-ipratropium 2.5 mg-0.5 mg/3 mL nebulizer solution 3 mL    [MAR Hold - Suspended Admission] ALPRAZolam tablet 0.5 mg    [MAR Hold - Suspended Admission] aspirin EC tablet 81 mg    [MAR Hold - Suspended Admission] carvediloL tablet 12.5 mg    [MAR Hold - Suspended Admission] cefepime in dextrose 5 % IVPB 2 g    [MAR Hold - Suspended Admission] dextrose 10% bolus 125 mL    [MAR Hold - Suspended Admission] dextrose 10% bolus 250 mL    [MAR Hold - Suspended Admission] dextrose 50% injection 12.5 g    [MAR Hold - Suspended Admission] dicyclomine capsule 10 mg    [MAR Hold - Suspended Admission] fat emulsion 20% infusion 250 mL    [MAR Hold - Suspended Admission] fat emulsion 20% infusion 250 mL    [MAR Hold - Suspended Admission] furosemide injection 40 mg    [COMPLETED] furosemide injection 40 mg    [MAR Hold - Suspended Admission] glucagon (human recombinant) injection 1 mg    [MAR Hold - Suspended Admission] hydrocortisone 2.5 % rectal cream    [MAR Hold - Suspended Admission] insulin aspart U-100 pen 1-10 Units    [MAR Hold - Suspended Admission] melatonin tablet 6 mg    [MAR Hold - Suspended Admission] ondansetron injection 4 mg    [MAR Hold - Suspended Admission] pantoprazole EC tablet 40 mg    [MAR Hold - Suspended Admission] promethazine tablet 25 mg    [MAR Hold - Suspended Admission] scopolamine 1.3-1.5 mg (1 mg over 3 days) 1 patch    [MAR Hold - Suspended Admission] simethicone chewable tablet 80 mg    [MAR Hold - Suspended Admission] sodium chloride 0.9% flush 10 mL    [MAR Hold - Suspended Admission] sodium chloride 0.9% flush 10 mL    And    [MAR Hold - Suspended Admission] sodium chloride 0.9% flush 10 mL    [MAR Hold - Suspended Admission] traMADoL tablet 50 mg    [DISCONTINUED] AA 5%-D15W-electrolytes 2,000 mL     still has full capacity to walk and ambulate safely without any acute pain issues.    1/15/24: She is doing okay. She cancelled the trip to [place] but is looking forward to a trip to [place] for about 4-5 weeks. Still a lotof tension/tightness into the neck upper back/shoulder and L>R sacrum/glute, but no acute flare-ups.     1/2/24: She has been hosting a lot for the holidays and has been feeing some more tension and tightness/pain in to the L>R upper back and neck. She is also leaving for a trip to [place], [place] on Saturday. Luckily, the L sacrum has not bee too bad. She is hoping that a treatment will help keep her comfortable during her travels.   __________  2022:    She got the MRI of her head which was negative. It just showed some typical degenerative changes for her age.     R knee (arthroscopic meniscus) surgery for 12/7/21 with Dr. Larkin.  __________   2021:   Additional info: She has recently seen and ortho and had MRI of her R knee. Confirmed meniscus injury. Saw Dr. Lujan on 8/5 who also suggested surgery. She is being refereed to Dr. Rincon for a surgical consultation. However, at this time Naomi is not comfortable with surgery and feels as if she can prolong the need for this for a while. She may cancel her consultation with Dr. Rincon until she is ready to commit to surgical procedure.    Review of Systems   Constitutional:  Negative for chills and fever.   HENT:  Negative for congestion and sneezing.    Eyes:  Negative for visual disturbance.   Respiratory:  Negative for shortness of breath.         +Asthma (managed with inhalers)   Cardiovascular:  Negative for chest pain.   Gastrointestinal:  Negative for abdominal pain and nausea.   Endocrine: Negative for polyuria.   Genitourinary:  Negative for difficulty urinating.   Musculoskeletal:  Positive for arthralgias, back pain, myalgias, neck pain and neck stiffness.        + Takes Cymbalta and Rx Flexeril for pain, +OA of knee (past surgery)   Neurological:   Negative for facial asymmetry.   Psychiatric/Behavioral:  Negative for agitation and confusion.         +Anxiety/Depression     Objective   Observation : normal gait, forward head posture, and hyperkyphosis    Physical Exam  Examination findings (palpation & ROM): Tenderness and hypertonicity over the upper trapezius, cervical paraspinals, levator scapula, lumbar paraspinals, L4-L5 junction and iliolumbar ligament on the left side.     Segmental joint dysfunction was identified in the following areas using motion palpation and/or pain provocation assessment:  Cervical: C0-C6 (manual traction and joint mobilizations)  Thoracic: T1-T5   Lumbopelvic: L SIJ and L4-L5 (Side-lying traction and end range stretching/mobilizations).     Today's treatment:  Performed spinal manipulation to the regions of segmental dysfunction identified on examination using age-appropriate and injury specific force, and manual diversified technique.     Brief supine IC/MFR to B UT, lev scap and cervical paraspinals.  Brief prone ischemic compression and stretching to the left greater than right SI joint and lower back.           Treatment Plan:   The patient and I discussed the risks and benefits of chiropractic care. Based on the patient's subjective complaints along with the examination findings, it is advised that a course of chiropractic treatment by initiated. Consent for care was given both written and orally by the patient. The patient tolerated today's treatment with little or no additional discomfort and was instructed to contact the office for questions or concerns.     Treatment Frequency: Will see/treat patient every 2-4 weeks as therapeutic benefit is sustained, then frequency will be reduced to as needed for symptom management or as needed for acute flare-ups/exacerbation.     Please note: Voice-to-text software was used when completing this note.  While the note was proofread, portions may include grammatical errors.  Please  [DISCONTINUED] AA 5%-D15W-electrolytes 2,000 mL    [DISCONTINUED] AA 5%-D15W-electrolytes 2,000 mL     Current Outpatient Medications on File Prior to Encounter   Medication Sig    acetaminophen (TYLENOL) 500 MG tablet Take 2 tablets (1,000 mg total) by mouth every 8 (eight) hours as needed for Pain.    albuterol (PROVENTIL/VENTOLIN HFA) 90 mcg/actuation inhaler Inhale 2 puffs into the lungs every 6 (six) hours as needed for Wheezing. Rescue    ALPRAZolam (XANAX) 0.5 MG tablet Take 1 tablet (0.5 mg total) by mouth nightly as needed for Anxiety.    aspirin (ECOTRIN) 81 MG EC tablet Take 1 tablet (81 mg total) by mouth once daily.    carvediloL (COREG) 12.5 MG tablet Take 1 tablet (12.5 mg total) by mouth 2 (two) times daily with meals.    dicyclomine (BENTYL) 10 MG capsule Take 1 capsule (10 mg total) by mouth 4 (four) times daily as needed.    ezetimibe (ZETIA) 10 mg tablet Take 1 tablet (10 mg total) by mouth every evening.    fluticasone propionate (FLONASE) 50 mcg/actuation nasal spray 1 spray (50 mcg total) by Each Nostril route once daily.    Lactobacillus rhamnosus GG (CULTURELLE) 10 billion cell capsule Take 1 capsule by mouth once daily.    metronidazole (FLAGYL) 50 mg/mL Syrg Take 10 mLs (500 mg total) by mouth every 8 (eight) hours.    nitroGLYCERIN (NITROSTAT) 0.4 MG SL tablet Place 1 tablet (0.4 mg total) under the tongue every 5 (five) minutes as needed for Chest pain. (Patient not taking: Reported on 4/20/2022)    omeprazole (PRILOSEC) 40 MG capsule Take 1 capsule (40 mg total) by mouth once daily.    prasugreL (EFFIENT) 10 mg Tab Take 1 tablet (10 mg total) by mouth once daily.    promethazine (PROMETHEGAN) 25 MG suppository Place 1 suppository (25 mg total) rectally every 6 (six) hours as needed for Nausea.    simethicone (MYLICON) 80 MG chewable tablet Take 80 mg by mouth every 8 (eight) hours as needed.    spironolactone (ALDACTONE) 25 MG tablet Take 1 tablet (25 mg total) by mouth once daily.      Family History       Problem Relation (Age of Onset)    Emphysema Father    Heart attack Brother    Heart disease Mother          Tobacco Use    Smoking status: Former Smoker     Packs/day: 0.50     Start date: 1976     Quit date: 2012     Years since quittin.4    Smokeless tobacco: Never Used   Substance and Sexual Activity    Alcohol use: No    Drug use: No    Sexual activity: Not on file     Review of Systems   Constitutional:  Negative for chills and fever.   HENT:  Negative for congestion and rhinorrhea.    Respiratory:  Negative for chest tightness and shortness of breath.    Cardiovascular:  Negative for chest pain and leg swelling.   Gastrointestinal:  Positive for abdominal pain, nausea and vomiting.   Genitourinary:  Negative for dysuria, frequency and urgency.   Musculoskeletal:  Negative for arthralgias and back pain.   Skin:  Negative for rash and wound.   Neurological:  Negative for dizziness, seizures and weakness.   Psychiatric/Behavioral:  Negative for agitation and confusion.    Objective:     Vital Signs (Most Recent):  Temp: 97.7 °F (36.5 °C) (22 2300)  Pulse: 66 (22 0100)  Resp: 18 (22 0100)  BP: (!) 121/58 (22 0100)  SpO2: 96 % (22 0100)   Vital Signs (24h Range):  Temp:  [96 °F (35.6 °C)-98.6 °F (37 °C)] 97.7 °F (36.5 °C)  Pulse:  [65-79] 66  Resp:  [17-22] 18  SpO2:  [88 %-100 %] 96 %  BP: (106-121)/(55-63) 121/58     Weight: 60.3 kg (133 lb)  Body mass index is 23.56 kg/m².    Physical Exam  Vitals and nursing note reviewed.   Constitutional:       General: She is not in acute distress.     Appearance: She is well-developed.   HENT:      Head: Normocephalic and atraumatic.      Mouth/Throat:      Pharynx: No oropharyngeal exudate.   Eyes:      Conjunctiva/sclera: Conjunctivae normal.      Pupils: Pupils are equal, round, and reactive to light.   Cardiovascular:      Rate and Rhythm: Normal rate and regular rhythm.      Heart sounds: Normal heart  contact me with any questions/concerns as it relates to these types of errors.   sounds.   Pulmonary:      Effort: Pulmonary effort is normal. No respiratory distress.      Breath sounds: Normal breath sounds. No wheezing.   Abdominal:      General: Bowel sounds are normal. There is no distension.      Palpations: Abdomen is soft.      Tenderness: There is abdominal tenderness.   Musculoskeletal:         General: No tenderness. Normal range of motion.      Cervical back: Normal range of motion and neck supple.   Lymphadenopathy:      Cervical: No cervical adenopathy.   Skin:     General: Skin is warm and dry.      Capillary Refill: Capillary refill takes less than 2 seconds.      Findings: No rash.   Neurological:      Mental Status: She is alert and oriented to person, place, and time.      Cranial Nerves: No cranial nerve deficit.      Sensory: No sensory deficit.      Coordination: Coordination normal.   Psychiatric:         Behavior: Behavior normal.         Thought Content: Thought content normal.         Judgment: Judgment normal.         CRANIAL NERVES     CN III, IV, VI   Pupils are equal, round, and reactive to light.     Significant Labs: All pertinent labs within the past 24 hours have been reviewed.  Bilirubin:   Recent Labs   Lab 04/29/22  0326 05/01/22  0500 05/02/22  0445 05/04/22  0540 05/06/22  0420 05/06/22  2150   BILIDIR 0.3  --  0.3 0.4* 0.5*  --    BILITOT 0.5 0.8 0.6 0.7 0.7 1.2*     CBC:   Recent Labs   Lab 05/06/22  0420 05/06/22  2150 05/06/22  2155   WBC 5.74 2.00*  --    HGB 9.9* 16.1*  --    HCT 32.6* 50.6* 46    139*  --      CMP:   Recent Labs   Lab 05/05/22  0515 05/06/22  0420 05/06/22  2150   * 131* 135*   K 4.3 4.9 3.8    102 99   CO2 24 23 27   * 117* 78   BUN 20 22 20   CREATININE 0.6 0.6 0.6   CALCIUM 8.0* 8.3* 8.3*   PROT  --  6.9 7.0   ALBUMIN  --  2.0* 2.2*   BILITOT  --  0.7 1.2*   ALKPHOS  --  79 85   AST  --  37 46*   ALT  --  22 25   ANIONGAP 5* 6* 9   EGFRNONAA >60.0 >60.0 >60.0       Significant Imaging: I have reviewed all  pertinent imaging results/findings within the past 24 hours.  US Abdomen Limited  Narrative: EXAMINATION:  US ABDOMEN LIMITED    CLINICAL HISTORY:  Acute cholecystits on CT scan;.    TECHNIQUE:  Limited ultrasound of the right upper quadrant of the abdomen including pancreas, liver, gallbladder, common bile duct was performed.    COMPARISON:  CT abdomen pelvis 05/04/2022.    FINDINGS:  Liver: Upper normal in size, measuring 18.5 cm. Homogeneous echotexture. No focal hepatic lesions.    Gallbladder: Gallstones are seen.  There is gallbladder wall thickening measuring up to 8 mm.  There is pericholecystic fluid.  No sonographic Rasmussen's sign or hypervascularity.    Biliary system: The common duct is not dilated, measuring 1 mm.  No intrahepatic ductal dilatation.    Spleen: Upper normal in size with a homogeneous echotexture, measuring 11.7 x 5 cm.    Pancreas: The visualized portions of pancreas appear normal.    Miscellaneous: Bilateral pleural effusions.  No hydronephrosis on either side.  Impression: Cholelithiasis, gallbladder wall thickening and pericholecystic fluid.  No sonographic Rasmussen's sign or wall hyperemia.  These findings are equivocal for acute cholecystitis.  Similar findings could be seen in setting of congestive heart failure or hepatic dysfunction.  If clinical concern persists for acute cholecystitis, HIDA scan may provide further information.    Bilateral pleural effusions.    This report was flagged in Epic as abnormal.    Electronically signed by resident: Marixa Santillan MD  Date:    05/05/2022  Time:    18:52    Electronically signed by: Doug Caballero MD  Date:    05/05/2022  Time:    19:01

## 2024-03-20 ENCOUNTER — CLINICAL SUPPORT (OUTPATIENT)
Dept: PRIMARY CARE | Facility: CLINIC | Age: 69
End: 2024-03-20
Payer: MEDICARE

## 2024-03-20 DIAGNOSIS — E53.8 VITAMIN B12 DEFICIENCY: ICD-10-CM

## 2024-03-20 PROCEDURE — 96372 THER/PROPH/DIAG INJ SC/IM: CPT | Performed by: INTERNAL MEDICINE

## 2024-03-20 RX ADMIN — CYANOCOBALAMIN 1000 MCG: 1000 INJECTION, SOLUTION INTRAMUSCULAR; SUBCUTANEOUS at 14:02

## 2024-03-21 ENCOUNTER — APPOINTMENT (OUTPATIENT)
Dept: INTEGRATIVE MEDICINE | Facility: CLINIC | Age: 69
End: 2024-03-21
Payer: COMMERCIAL

## 2024-03-25 ENCOUNTER — APPOINTMENT (OUTPATIENT)
Dept: INTEGRATIVE MEDICINE | Facility: CLINIC | Age: 69
End: 2024-03-25
Payer: COMMERCIAL

## 2024-03-27 ENCOUNTER — APPOINTMENT (OUTPATIENT)
Dept: INTEGRATIVE MEDICINE | Facility: CLINIC | Age: 69
End: 2024-03-27
Payer: COMMERCIAL

## 2024-03-30 ASSESSMENT — ENCOUNTER SYMPTOMS
AGITATION: 0
FACIAL ASYMMETRY: 0
NECK STIFFNESS: 1
ARTHRALGIAS: 1
CHILLS: 0
BACK PAIN: 1
NAUSEA: 0
MYALGIAS: 1
SHORTNESS OF BREATH: 0
NECK PAIN: 1
FEVER: 0
ABDOMINAL PAIN: 0
CONFUSION: 0
DIFFICULTY URINATING: 0

## 2024-03-30 NOTE — PROGRESS NOTES
Subjective   Patient ID: Naomi Dunaway is a 69 y.o. female who presents today for the treatment of pain involving their neck, upper back and L sacral pains.    This is visit 6 of the 2024 calendar year. Medicare.     Fall risk: None. No falls in the last 6 months. (She does had Hx falling in the past)    HPI -no significant changes to report today.  Specifically regarding her pain.  However she has had a lot of stress with her dog being sick, her  having a flareup in his pain and also in the midst of closing on their home and moving to a new condo.  She is planning to do a lot of of packing and moving next week and is anticipating that she will likely be more sore as a result.    Last treatment 3/15/24: physically she is holding up okay, however she has had a lot of increased stress the last 2 weeks.  She and her  bought a new condo and are in the process of now selling their home.  She also is dealing with a cancer diagnosis of her dog and found out today she needs to take him to the emergency vet this afternoon.    We will continue to treat her historically tight and painful areas including the neck, upper back/traps and lower back/hips.  She is slowly getting back to work.     3/6/24: she reports that she had a really great trip to [place] with her  where they stayed for a month.  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  Overall she is doing pretty good.  She did do a trial class of Pilates and really enjoyed it.  She is thinking of joining club [employer] in [place] where she can do this form of exercise a little more regularly.    She like to treat her chronically tight neck upper back and shoulders as well as the left sacrum and hip.  Although the knee still has limitations and stiffness it is not actively/acutely bothering her today.    1/23/24: she is leaving for her road trip to [place] on Thursday/Friday depending  on the weather with her  and their dog.  She would like to focus on the neck and shoulders but is also having some slight increase in symptoms over the left piriformis/gluteal/sacral region.  This tends to get worse when sitting or in a long car ride and she was nervous before she left and made this appointment.  No numbness and tingling into the leg and no debilitating pain.  She still has full capacity to walk and ambulate safely without any acute pain issues.    1/15/24: She is doing okay. She cancelled the trip to [place] but is looking forward to a trip to [place] for about 4-5 weeks. Still a lotof tension/tightness into the neck upper back/shoulder and L>R sacrum/glute, but no acute flare-ups.     1/2/24: She has been hosting a lot for the holidays and has been feeing some more tension and tightness/pain in to the L>R upper back and neck. She is also leaving for a trip to [place], [place] on Saturday. Luckily, the L sacrum has not bee too bad. She is hoping that a treatment will help keep her comfortable during her travels.   __________  2022:    She got the MRI of her head which was negative. It just showed some typical degenerative changes for her age.     R knee (arthroscopic meniscus) surgery for 12/7/21 with Dr. Larkin.  __________   2021:   Additional info: She has recently seen and ortho and had MRI of her R knee. Confirmed meniscus injury. Saw Dr. Lujan on 8/5 who also suggested surgery. She is being refereed to Dr. Rincon for a surgical consultation. However, at this time Naomi is not comfortable with surgery and feels as if she can prolong the need for this for a while. She may cancel her consultation with Dr. Rincon until she is ready to commit to surgical procedure.    Review of Systems   Constitutional:  Negative for chills and fever.   HENT:  Negative for congestion and sneezing.    Eyes:  Negative for visual disturbance.   Respiratory:  Negative for shortness of breath.         +Asthma (managed with  inhalers)   Cardiovascular:  Negative for chest pain.   Gastrointestinal:  Negative for abdominal pain and nausea.   Endocrine: Negative for polyuria.   Genitourinary:  Negative for difficulty urinating.   Musculoskeletal:  Positive for arthralgias, back pain, myalgias, neck pain and neck stiffness.        + Takes Cymbalta and Rx Flexeril for pain, +OA of knee (past surgery)   Neurological:  Negative for facial asymmetry.   Psychiatric/Behavioral:  Negative for agitation and confusion.         +Anxiety/Depression     Objective   Observation : normal gait, forward head posture, and hyperkyphosis    Physical Exam  Examination findings (palpation & ROM): Tenderness and hypertonicity over the upper trapezius, cervical paraspinals, levator scapula, lumbar paraspinals, L4-L5 junction and iliolumbar ligament on the left side.     Segmental joint dysfunction was identified in the following areas using motion palpation and/or pain provocation assessment:  Cervical: C0-C6 (manual traction and joint mobilizations)  Thoracic: T1-T5   Lumbopelvic: L SIJ and L4-L5 (Side-lying traction and end range stretching/mobilizations).     Today's treatment:  Performed spinal manipulation to the regions of segmental dysfunction identified on examination using age-appropriate and injury specific force, and manual diversified technique.     Brief supine IC/MFR to B UT, lev scap and cervical paraspinals.  Brief prone ischemic compression and stretching to the left greater than right SI joint and lower back.           Treatment Plan:   The patient and I discussed the risks and benefits of chiropractic care. Based on the patient's subjective complaints along with the examination findings, it is advised that a course of chiropractic treatment by initiated. Consent for care was given both written and orally by the patient. The patient tolerated today's treatment with little or no additional discomfort and was instructed to contact the office for  questions or concerns.     Treatment Frequency: Will see/treat patient every 2-4 weeks as therapeutic benefit is sustained, then frequency will be reduced to as needed for symptom management or as needed for acute flare-ups/exacerbation.     Please note: Voice-to-text software was used when completing this note.  While the note was proofread, portions may include grammatical errors.  Please contact me with any questions/concerns as it relates to these types of errors.

## 2024-04-01 ENCOUNTER — LAB (OUTPATIENT)
Dept: LAB | Facility: LAB | Age: 69
End: 2024-04-01
Payer: MEDICARE

## 2024-04-01 ENCOUNTER — ALLIED HEALTH (OUTPATIENT)
Dept: INTEGRATIVE MEDICINE | Facility: CLINIC | Age: 69
End: 2024-04-01
Payer: MEDICARE

## 2024-04-01 DIAGNOSIS — E53.8 B12 DEFICIENCY: Primary | ICD-10-CM

## 2024-04-01 DIAGNOSIS — M99.04 SEGMENTAL AND SOMATIC DYSFUNCTION OF SACRAL REGION: Primary | ICD-10-CM

## 2024-04-01 DIAGNOSIS — E53.8 VITAMIN B12 DEFICIENCY: ICD-10-CM

## 2024-04-01 DIAGNOSIS — M54.6 CHRONIC BILATERAL THORACIC BACK PAIN: ICD-10-CM

## 2024-04-01 DIAGNOSIS — M54.2 NECK PAIN: ICD-10-CM

## 2024-04-01 DIAGNOSIS — G89.29 CHRONIC BILATERAL THORACIC BACK PAIN: ICD-10-CM

## 2024-04-01 DIAGNOSIS — M79.9 POSTURAL STRAIN: ICD-10-CM

## 2024-04-01 DIAGNOSIS — M99.01 SEGMENTAL AND SOMATIC DYSFUNCTION OF CERVICAL REGION: ICD-10-CM

## 2024-04-01 DIAGNOSIS — M53.3 SACRAL BACK PAIN: ICD-10-CM

## 2024-04-01 DIAGNOSIS — M99.02 SEGMENTAL AND SOMATIC DYSFUNCTION OF THORACIC REGION: ICD-10-CM

## 2024-04-01 DIAGNOSIS — M99.00 SEGMENTAL AND SOMATIC DYSFUNCTION OF HEAD REGION: ICD-10-CM

## 2024-04-01 DIAGNOSIS — M99.03 SEGMENTAL AND SOMATIC DYSFUNCTION OF LUMBAR REGION: ICD-10-CM

## 2024-04-01 DIAGNOSIS — M99.05 SEGMENTAL AND SOMATIC DYSFUNCTION OF PELVIC REGION: ICD-10-CM

## 2024-04-01 DIAGNOSIS — M79.10 MYALGIA: ICD-10-CM

## 2024-04-01 DIAGNOSIS — E53.8 B12 DEFICIENCY: ICD-10-CM

## 2024-04-01 LAB — VIT B12 SERPL-MCNC: 516 PG/ML (ref 211–911)

## 2024-04-01 PROCEDURE — 36415 COLL VENOUS BLD VENIPUNCTURE: CPT

## 2024-04-01 PROCEDURE — 98942 CHIROPRACTIC MANJ 5 REGIONS: CPT | Performed by: CHIROPRACTOR

## 2024-04-01 PROCEDURE — 82607 VITAMIN B-12: CPT

## 2024-04-01 NOTE — PROGRESS NOTES
"Subjective   Patient ID:   [id number]   Naomi Dunaway is a 69 y.o. female who presents for Allergies and Asthma (Follow up).    Chief Complaint   Patient presents with    Allergies    Asthma     Follow up       Since last visit, 10-3-23, patient reports she stopped Advair for no specific reason other than being \"noncompliant.\"  She uses rescue inhaler 2-3x  a week and prior to exercise.  She also uses it for intermittent SOB and when she wakes at night coughing about one time a week.      Patient had a vocal cord polypectomy many years ago in [place] in the past.    Patient states her reflux is under excellent control with pantoprazole.      Patient is moving this Friday and has to be out of their home in 3 weeks.  She anticipates she will have some exacerbation of her shortness of breath due to activity and carrying boxes.    Review of Systems   Respiratory:  Positive for cough (intermittent) and shortness of breath (intermittent).      Objective     Pulse 74   Wt 49 kg (108 lb)   SpO2 97%   BMI 20.08 kg/m²      Physical Exam  Constitutional:       Appearance: Normal appearance.   HENT:      Head: Normocephalic and atraumatic.      Right Ear: External ear normal. There is no impacted cerumen.      Left Ear: External ear normal. There is no impacted cerumen.      Nose: No congestion or rhinorrhea.   Eyes:      Extraocular Movements: Extraocular movements intact.      Conjunctiva/sclera: Conjunctivae normal.      Pupils: Pupils are equal, round, and reactive to light.   Cardiovascular:      Rate and Rhythm: Normal rate and regular rhythm.      Heart sounds: No murmur heard.     No friction rub. No gallop.   Pulmonary:      Effort: No respiratory distress.      Breath sounds: No wheezing, rhonchi or rales.   Skin:     General: Skin is warm and dry.   Neurological:      Mental Status: She is alert.   Psychiatric:         Mood and Affect: Mood normal.         Behavior: Behavior normal.       Pulmonary Functions " Testing Results:    FEV1   Date Value Ref Range Status   04/02/2024 109 liters Final     FVC   Date Value Ref Range Status   04/02/2024 105 liters Final     FEV1/FVC   Date Value Ref Range Status   04/02/2024 102 % Final       Assessment/Plan       \Asthma  ACT is 15.  PFT is significant for vocal cord irritation, which makes it difficult to interpret the PFT.  She is currently not under control because she is not using her Advair.  In addition her vocal cord irritation is most likely playing a role.  I would like her to be compliant in using her Advair and then we will reassess her asthma.  She may be having vocal cord issues to compensate for uncontrolled asthma, or her vocal cord irritation may be a separate issue.  She does have a history of reflux but it is currently under control so I do not think that is causing her vocal cord irritation.      Send me a message in 3-4 weeks to give me an update on your condition.  If the cough does not resolve, let me know so I can provide a referral to a vocal cord specialist.        GERD (gastroesophageal reflux disease)  Under control with pantoprazole    By signing my name below, I, Melva Snow, attest that this documentation has been prepared under the direction and in the presence of Tosha Salomon MD.  All medical record entries made by the Scribe were at my direction and personally dictated by me. I have reviewed the chart and agree that the record accurately reflects my personal performance of the history, physical exam, discussion and plan.

## 2024-04-01 NOTE — ASSESSMENT & PLAN NOTE
ACT is 15.  PFT is significant for vocal cord irritation, which makes it difficult to interpret the PFT.  She is currently not under control because she is not using her Advair.  In addition her vocal cord irritation is most likely playing a role.  I would like her to be compliant in using her Advair and then we will reassess her asthma.  She may be having vocal cord issues to compensate for uncontrolled asthma, or her vocal cord irritation may be a separate issue.  She does have a history of reflux but it is currently under control so I do not think that is causing her vocal cord irritation.      Send me a message in 3-4 weeks to give me an update on your condition.  If the cough does not resolve, let me know so I can provide a referral to a vocal cord specialist.

## 2024-04-02 ENCOUNTER — OFFICE VISIT (OUTPATIENT)
Dept: ALLERGY | Facility: CLINIC | Age: 69
End: 2024-04-02
Payer: MEDICARE

## 2024-04-02 VITALS — HEART RATE: 74 BPM | OXYGEN SATURATION: 97 % | WEIGHT: 108 LBS | BODY MASS INDEX: 20.08 KG/M2

## 2024-04-02 DIAGNOSIS — J45.20 MILD INTERMITTENT ASTHMA, UNSPECIFIED WHETHER COMPLICATED (HHS-HCC): Primary | ICD-10-CM

## 2024-04-02 DIAGNOSIS — K21.9 GASTROESOPHAGEAL REFLUX DISEASE, UNSPECIFIED WHETHER ESOPHAGITIS PRESENT: ICD-10-CM

## 2024-04-02 LAB
FEV1/FVC: 102 %
FEV1: 109 LITERS
FVC: 105 LITERS

## 2024-04-02 PROCEDURE — 1159F MED LIST DOCD IN RCRD: CPT | Performed by: ALLERGY & IMMUNOLOGY

## 2024-04-02 PROCEDURE — 1036F TOBACCO NON-USER: CPT | Performed by: ALLERGY & IMMUNOLOGY

## 2024-04-02 PROCEDURE — 1160F RVW MEDS BY RX/DR IN RCRD: CPT | Performed by: ALLERGY & IMMUNOLOGY

## 2024-04-02 PROCEDURE — 1157F ADVNC CARE PLAN IN RCRD: CPT | Performed by: ALLERGY & IMMUNOLOGY

## 2024-04-02 PROCEDURE — 94375 RESPIRATORY FLOW VOLUME LOOP: CPT | Performed by: ALLERGY & IMMUNOLOGY

## 2024-04-02 PROCEDURE — 99214 OFFICE O/P EST MOD 30 MIN: CPT | Performed by: ALLERGY & IMMUNOLOGY

## 2024-04-02 RX ORDER — DEXTROAMPHETAMINE SACCHARATE, AMPHETAMINE ASPARTATE, DEXTROAMPHETAMINE SULFATE AND AMPHETAMINE SULFATE 2.5; 2.5; 2.5; 2.5 MG/1; MG/1; MG/1; MG/1
TABLET ORAL
COMMUNITY
Start: 2021-06-16

## 2024-04-02 RX ORDER — SCOLOPAMINE TRANSDERMAL SYSTEM 1 MG/1
PATCH, EXTENDED RELEASE TRANSDERMAL
COMMUNITY
Start: 2017-06-12

## 2024-04-02 ASSESSMENT — ENCOUNTER SYMPTOMS
SHORTNESS OF BREATH: 1
COUGH: 1

## 2024-04-02 NOTE — PATIENT INSTRUCTIONS
Restart Advair every day in the morning for 2-3 weeks.      Send me a message in 3-4 weeks to give me an update on your condition.  If the cough does not resolve, let me know so I can provide a referral to a vocal cord specialist.    Continue albuterol as needed.    Follow up in 6 months or sooner if symptoms worsen prior.

## 2024-04-15 DIAGNOSIS — J32.2 CHRONIC ETHMOIDAL SINUSITIS: Primary | ICD-10-CM

## 2024-04-15 RX ORDER — MOMETASONE FUROATE 100 %
2 POWDER (GRAM) MISCELLANEOUS DAILY
Qty: 60 G | Refills: 11 | Status: SHIPPED | OUTPATIENT
Start: 2024-04-15

## 2024-04-24 ENCOUNTER — CLINICAL SUPPORT (OUTPATIENT)
Dept: PRIMARY CARE | Facility: CLINIC | Age: 69
End: 2024-04-24
Payer: MEDICARE

## 2024-04-24 DIAGNOSIS — E53.8 VITAMIN B12 DEFICIENCY: ICD-10-CM

## 2024-04-24 PROCEDURE — 96372 THER/PROPH/DIAG INJ SC/IM: CPT | Performed by: INTERNAL MEDICINE

## 2024-04-24 RX ADMIN — CYANOCOBALAMIN 1000 MCG: 1000 INJECTION, SOLUTION INTRAMUSCULAR; SUBCUTANEOUS at 13:27

## 2024-05-01 ENCOUNTER — ALLIED HEALTH (OUTPATIENT)
Dept: INTEGRATIVE MEDICINE | Facility: CLINIC | Age: 69
End: 2024-05-01
Payer: MEDICARE

## 2024-05-01 DIAGNOSIS — M54.6 CHRONIC BILATERAL THORACIC BACK PAIN: ICD-10-CM

## 2024-05-01 DIAGNOSIS — M54.2 NECK PAIN: ICD-10-CM

## 2024-05-01 DIAGNOSIS — M79.9 POSTURAL STRAIN: ICD-10-CM

## 2024-05-01 DIAGNOSIS — M99.00 SEGMENTAL AND SOMATIC DYSFUNCTION OF HEAD REGION: ICD-10-CM

## 2024-05-01 DIAGNOSIS — M99.03 SEGMENTAL AND SOMATIC DYSFUNCTION OF LUMBAR REGION: ICD-10-CM

## 2024-05-01 DIAGNOSIS — M79.10 MYALGIA: ICD-10-CM

## 2024-05-01 DIAGNOSIS — M53.3 SACRAL BACK PAIN: ICD-10-CM

## 2024-05-01 DIAGNOSIS — M54.42 ACUTE LEFT-SIDED LOW BACK PAIN WITH LEFT-SIDED SCIATICA: ICD-10-CM

## 2024-05-01 DIAGNOSIS — M99.05 SEGMENTAL AND SOMATIC DYSFUNCTION OF PELVIC REGION: ICD-10-CM

## 2024-05-01 DIAGNOSIS — M99.02 SEGMENTAL AND SOMATIC DYSFUNCTION OF THORACIC REGION: ICD-10-CM

## 2024-05-01 DIAGNOSIS — G89.29 CHRONIC BILATERAL THORACIC BACK PAIN: ICD-10-CM

## 2024-05-01 DIAGNOSIS — M99.04 SEGMENTAL AND SOMATIC DYSFUNCTION OF SACRAL REGION: Primary | ICD-10-CM

## 2024-05-01 DIAGNOSIS — M99.01 SEGMENTAL AND SOMATIC DYSFUNCTION OF CERVICAL REGION: ICD-10-CM

## 2024-05-01 PROCEDURE — 98942 CHIROPRACTIC MANJ 5 REGIONS: CPT | Performed by: CHIROPRACTOR

## 2024-05-01 ASSESSMENT — ENCOUNTER SYMPTOMS
ABDOMINAL PAIN: 0
FEVER: 0
NAUSEA: 0
SHORTNESS OF BREATH: 0
DIFFICULTY URINATING: 0
ARTHRALGIAS: 1
NECK PAIN: 1
CHILLS: 0
BACK PAIN: 1
CONFUSION: 0
NECK STIFFNESS: 1
MYALGIAS: 1
FACIAL ASYMMETRY: 0
AGITATION: 0

## 2024-05-01 NOTE — PROGRESS NOTES
Subjective   Patient ID: Naomi Dunaway is a 69 y.o. female who presents today for the treatment of pain involving their neck, upper back and L sacral pains.    This is visit 7 of the 2024 calendar year. Medicare.     Fall risk: None. No falls in the last 6 months. (She does had Hx falling in the past)    HPI -she is finally basically moved into her new home and starting to settle in.  She continues to have an aching pain over the left sacrum and into the glutes and left upper leg region.  The pain is not acute but she does notice it daily.  She also continues to have pain across the upper back neck and shoulders.  No new injuries.  No changes to her medical history.    Last treatment 4/1/24: no significant changes to report today.  Specifically regarding her pain.  However she has had a lot of stress with her dog being sick, her  having a flareup in his pain and also in the midst of closing on their home and moving to a new condo.  She is planning to do a lot of of packing and moving next week and is anticipating that she will likely be more sore as a result.    3/15/24: physically she is holding up okay, however she has had a lot of increased stress the last 2 weeks.  She and her  bought a new condo and are in the process of now selling their home.  She also is dealing with a cancer diagnosis of her dog and found out today she needs to take him to the emergency vet this afternoon.    We will continue to treat her historically tight and painful areas including the neck, upper back/traps and lower back/hips.  She is slowly getting back to work.     3/6/24: she reports that she had a really great trip to [place] with her  where they stayed for a month.  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  Overall she is doing pretty good.  She did do a trial class of [employer] and really enjoyed it.  She is thinking of joining club [employer] in  [place] where she can do this form of exercise a little more regularly.    She like to treat her chronically tight neck upper back and shoulders as well as the left sacrum and hip.  Although the knee still has limitations and stiffness it is not actively/acutely bothering her today.    1/23/24: she is leaving for her road trip to [place] on Thursday/Friday depending on the weather with her  and their dog.  She would like to focus on the neck and shoulders but is also having some slight increase in symptoms over the left piriformis/gluteal/sacral region.  This tends to get worse when sitting or in a long car ride and she was nervous before she left and made this appointment.  No numbness and tingling into the leg and no debilitating pain.  She still has full capacity to walk and ambulate safely without any acute pain issues.    1/15/24: She is doing okay. She cancelled the trip to [place] but is looking forward to a trip to [place] for about 4-5 weeks. Still a lotof tension/tightness into the neck upper back/shoulder and L>R sacrum/glute, but no acute flare-ups.     1/2/24: She has been hosting a lot for the holidays and has been feeing some more tension and tightness/pain in to the L>R upper back and neck. She is also leaving for a trip to [place], [place] on Saturday. Luckily, the L sacrum has not bee too bad. She is hoping that a treatment will help keep her comfortable during her travels.   __________  2022:    She got the MRI of her head which was negative. It just showed some typical degenerative changes for her age.     R knee (arthroscopic meniscus) surgery for 12/7/21 with Dr. Larkin.  __________   2021:   Additional info: She has recently seen and ortho and had MRI of her R knee. Confirmed meniscus injury. Saw Dr. Lujan on 8/5 who also suggested surgery. She is being refereed to Dr. Rincon for a surgical consultation. However, at this time Naomi is not comfortable with surgery and feels as if she can prolong the  need for this for a while. She may cancel her consultation with Dr. Rincon until she is ready to commit to surgical procedure.    Review of Systems   Constitutional:  Negative for chills and fever.   HENT:  Negative for congestion and sneezing.    Eyes:  Negative for visual disturbance.   Respiratory:  Negative for shortness of breath.         +Asthma (managed with inhalers)   Cardiovascular:  Negative for chest pain.   Gastrointestinal:  Negative for abdominal pain and nausea.   Endocrine: Negative for polyuria.   Genitourinary:  Negative for difficulty urinating.   Musculoskeletal:  Positive for arthralgias, back pain, myalgias, neck pain and neck stiffness.        + Takes Cymbalta and Rx Flexeril for pain, +OA of knee (past surgery)   Neurological:  Negative for facial asymmetry.   Psychiatric/Behavioral:  Negative for agitation and confusion.         +Anxiety/Depression     Objective   Observation : normal gait, forward head posture, and hyperkyphosis    Physical Exam  Examination findings (palpation & ROM): Tenderness and hypertonicity over the upper trapezius, cervical paraspinals, levator scapula, lumbar paraspinals, L piriformis, L gluteals, and iliolumbar ligament on the left side.     Segmental joint dysfunction was identified in the following areas using motion palpation and/or pain provocation assessment:  Cervical: C0-C6 (manual traction and joint mobilizations)  Thoracic: T1-T5   Lumbopelvic: L SIJ and L4-L5 (Side-lying traction and end range stretching/mobilizations).     Today's treatment:  Performed spinal manipulation to the regions of segmental dysfunction identified on examination using age-appropriate and injury specific force, and manual diversified technique.     Brief supine IC/MFR to B UT, lev scap and cervical paraspinals.  Brief prone ischemic compression and stretching to the left greater than right SI joint and lower back.           Treatment Plan:   The patient and I discussed the risks  and benefits of chiropractic care. Based on the patient's subjective complaints along with the examination findings, it is advised that a course of chiropractic treatment by initiated. Consent for care was given both written and orally by the patient. The patient tolerated today's treatment with little or no additional discomfort and was instructed to contact the office for questions or concerns.     Treatment Frequency: Will see/treat patient every 2-4 weeks as therapeutic benefit is sustained, then frequency will be reduced to as needed for symptom management or as needed for acute flare-ups/exacerbation.     Please note: Voice-to-text software was used when completing this note.  While the note was proofread, portions may include grammatical errors.  Please contact me with any questions/concerns as it relates to these types of errors.

## 2024-05-06 ENCOUNTER — APPOINTMENT (OUTPATIENT)
Dept: INTEGRATIVE MEDICINE | Facility: CLINIC | Age: 69
End: 2024-05-06
Payer: COMMERCIAL

## 2024-05-15 ASSESSMENT — ENCOUNTER SYMPTOMS
BACK PAIN: 1
CONFUSION: 0
NECK PAIN: 1
DIFFICULTY URINATING: 0
CHILLS: 0
AGITATION: 0
NECK STIFFNESS: 1
NAUSEA: 0
FEVER: 0
ABDOMINAL PAIN: 0
FACIAL ASYMMETRY: 0
MYALGIAS: 1
SHORTNESS OF BREATH: 0
ARTHRALGIAS: 1

## 2024-05-15 NOTE — PROGRESS NOTES
Subjective   Patient ID: Naomi Dunaway is a 69 y.o. female who presents today for the treatment of pain involving their neck, upper back and L sacral pains.    This is visit 8 of the 2024 calendar year. Medicare.     Fall risk: None. No falls in the last 6 months. (She does had Hx falling in the past)    HPI - She is still getting organized in her new home, but overall she is doing well. Trying to pace her self. No yard work this year with the condo, which is good. Still most pain and tension into the neck and upper back as well as the L hip.      Last treatment 5/1/24: she is finally basically moved into her new home and starting to settle in.  She continues to have an aching pain over the left sacrum and into the glutes and left upper leg region.  The pain is not acute but she does notice it daily.  She also continues to have pain across the upper back neck and shoulders.  No new injuries.  No changes to her medical history.    4/1/24: no significant changes to report today.  Specifically regarding her pain.  However she has had a lot of stress with her dog being sick, her  having a flareup in his pain and also in the midst of closing on their home and moving to a new condo.  She is planning to do a lot of of packing and moving next week and is anticipating that she will likely be more sore as a result.    3/15/24: physically she is holding up okay, however she has had a lot of increased stress the last 2 weeks.  She and her  bought a new condo and are in the process of now selling their home.  She also is dealing with a cancer diagnosis of her dog and found out today she needs to take him to the emergency vet this afternoon.    We will continue to treat her historically tight and painful areas including the neck, upper back/traps and lower back/hips.  She is slowly getting back to work.     3/6/24: she reports that she had a really great trip to [place] with her  where they stayed for a  month.  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  Overall she is doing pretty good.  She did do a trial class of Pil[employer] and really enjoyed it.  She is thinking of joining club [employer] in [place] where she can do this form of exercise a little more regularly.    She like to treat her chronically tight neck upper back and shoulders as well as the left sacrum and hip.  Although the knee still has limitations and stiffness it is not actively/acutely bothering her today.    1/23/24: she is leaving for her road trip to [place] on Thursday/Friday depending on the weather with her  and their dog.  She would like to focus on the neck and shoulders but is also having some slight increase in symptoms over the left piriformis/gluteal/sacral region.  This tends to get worse when sitting or in a long car ride and she was nervous before she left and made this appointment.  No numbness and tingling into the leg and no debilitating pain.  She still has full capacity to walk and ambulate safely without any acute pain issues.    1/15/24: She is doing okay. She cancelled the trip to [place] but is looking forward to a trip to [place] for about 4-5 weeks. Still a lotof tension/tightness into the neck upper back/shoulder and L>R sacrum/glute, but no acute flare-ups.     1/2/24: She has been hosting a lot for the holidays and has been feeing some more tension and tightness/pain in to the L>R upper back and neck. She is also leaving for a trip to [place], [place] on Saturday. Luckily, the L sacrum has not bee too bad. She is hoping that a treatment will help keep her comfortable during her travels.   __________  2022:    She got the MRI of her head which was negative. It just showed some typical degenerative changes for her age.     R knee (arthroscopic meniscus) surgery for 12/7/21 with Dr. Larkin.  __________   2021:   Additional info: She has recently seen and ortho and  had MRI of her R knee. Confirmed meniscus injury. Saw Dr. Lujan on 8/5 who also suggested surgery. She is being refereed to Dr. Rincon for a surgical consultation. However, at this time Naomi is not comfortable with surgery and feels as if she can prolong the need for this for a while. She may cancel her consultation with Dr. Rincon until she is ready to commit to surgical procedure.    Review of Systems   Constitutional:  Negative for chills and fever.   HENT:  Negative for congestion and sneezing.    Eyes:  Negative for visual disturbance.   Respiratory:  Negative for shortness of breath.         +Asthma (managed with inhalers)   Cardiovascular:  Negative for chest pain.   Gastrointestinal:  Negative for abdominal pain and nausea.   Endocrine: Negative for polyuria.   Genitourinary:  Negative for difficulty urinating.   Musculoskeletal:  Positive for arthralgias, back pain, myalgias, neck pain and neck stiffness.        + Takes Cymbalta and Rx Flexeril for pain, +OA of knee (past surgery)   Neurological:  Negative for facial asymmetry.   Psychiatric/Behavioral:  Negative for agitation and confusion.         +Anxiety/Depression     Objective   Observation : normal gait, forward head posture, and hyperkyphosis    Physical Exam  Examination findings (palpation & ROM): Tenderness and hypertonicity over the upper trapezius, cervical paraspinals, levator scapula, lumbar paraspinals, L piriformis, L gluteals, and iliolumbar ligament on the left side.     Segmental joint dysfunction was identified in the following areas using motion palpation and/or pain provocation assessment:  Cervical: C0-C6 (manual traction and joint mobilizations)  Thoracic: T1-T5   Lumbopelvic: L SIJ and L4-L5 (Side-lying traction and end range stretching/mobilizations).     Today's treatment:  Performed spinal manipulation to the regions of segmental dysfunction identified on examination using age-appropriate and injury specific force, and manual  diversified technique.     Brief supine IC/MFR to B UT, lev scap and cervical paraspinals.  Brief prone ischemic compression and stretching to the left greater than right SI joint and lower back.           Treatment Plan:   The patient and I discussed the risks and benefits of chiropractic care. Based on the patient's subjective complaints along with the examination findings, it is advised that a course of chiropractic treatment by initiated. Consent for care was given both written and orally by the patient. The patient tolerated today's treatment with little or no additional discomfort and was instructed to contact the office for questions or concerns.     Treatment Frequency: Will see/treat patient every 2-4 weeks as therapeutic benefit is sustained, then frequency will be reduced to as needed for symptom management or as needed for acute flare-ups/exacerbation.     Please note: Voice-to-text software was used when completing this note.  While the note was proofread, portions may include grammatical errors.  Please contact me with any questions/concerns as it relates to these types of errors.

## 2024-05-16 ENCOUNTER — ALLIED HEALTH (OUTPATIENT)
Dept: INTEGRATIVE MEDICINE | Facility: CLINIC | Age: 69
End: 2024-05-16
Payer: MEDICARE

## 2024-05-16 DIAGNOSIS — M99.04 SEGMENTAL AND SOMATIC DYSFUNCTION OF SACRAL REGION: Primary | ICD-10-CM

## 2024-05-16 DIAGNOSIS — M99.03 SEGMENTAL AND SOMATIC DYSFUNCTION OF LUMBAR REGION: ICD-10-CM

## 2024-05-16 DIAGNOSIS — M53.3 SACRAL BACK PAIN: ICD-10-CM

## 2024-05-16 DIAGNOSIS — M54.2 NECK PAIN: ICD-10-CM

## 2024-05-16 DIAGNOSIS — M79.9 POSTURAL STRAIN: ICD-10-CM

## 2024-05-16 DIAGNOSIS — M79.10 MYALGIA: ICD-10-CM

## 2024-05-16 DIAGNOSIS — M99.02 SEGMENTAL AND SOMATIC DYSFUNCTION OF THORACIC REGION: ICD-10-CM

## 2024-05-16 DIAGNOSIS — M99.01 SEGMENTAL AND SOMATIC DYSFUNCTION OF CERVICAL REGION: ICD-10-CM

## 2024-05-16 DIAGNOSIS — G89.29 CHRONIC BILATERAL THORACIC BACK PAIN: ICD-10-CM

## 2024-05-16 DIAGNOSIS — M99.00 SEGMENTAL AND SOMATIC DYSFUNCTION OF HEAD REGION: ICD-10-CM

## 2024-05-16 DIAGNOSIS — M54.6 CHRONIC BILATERAL THORACIC BACK PAIN: ICD-10-CM

## 2024-05-16 DIAGNOSIS — M99.05 SEGMENTAL AND SOMATIC DYSFUNCTION OF PELVIC REGION: ICD-10-CM

## 2024-05-16 PROCEDURE — 98941 CHIROPRACT MANJ 3-4 REGIONS: CPT | Performed by: CHIROPRACTOR

## 2024-05-24 ENCOUNTER — CLINICAL SUPPORT (OUTPATIENT)
Dept: PRIMARY CARE | Facility: CLINIC | Age: 69
End: 2024-05-24
Payer: MEDICARE

## 2024-05-24 DIAGNOSIS — E53.8 VITAMIN B12 DEFICIENCY: ICD-10-CM

## 2024-05-24 PROCEDURE — 96372 THER/PROPH/DIAG INJ SC/IM: CPT | Performed by: INTERNAL MEDICINE

## 2024-05-24 RX ADMIN — CYANOCOBALAMIN 1000 MCG: 1000 INJECTION, SOLUTION INTRAMUSCULAR; SUBCUTANEOUS at 11:47

## 2024-05-30 ENCOUNTER — PATIENT MESSAGE (OUTPATIENT)
Dept: BEHAVIORAL HEALTH | Facility: CLINIC | Age: 69
End: 2024-05-30

## 2024-05-30 ENCOUNTER — ALLIED HEALTH (OUTPATIENT)
Dept: INTEGRATIVE MEDICINE | Facility: CLINIC | Age: 69
End: 2024-05-30
Payer: MEDICARE

## 2024-05-30 DIAGNOSIS — G89.29 CHRONIC BILATERAL THORACIC BACK PAIN: ICD-10-CM

## 2024-05-30 DIAGNOSIS — M79.9 POSTURAL STRAIN: ICD-10-CM

## 2024-05-30 DIAGNOSIS — M99.04 SEGMENTAL AND SOMATIC DYSFUNCTION OF SACRAL REGION: ICD-10-CM

## 2024-05-30 DIAGNOSIS — M53.3 SACRAL BACK PAIN: ICD-10-CM

## 2024-05-30 DIAGNOSIS — M99.00 SEGMENTAL AND SOMATIC DYSFUNCTION OF HEAD REGION: ICD-10-CM

## 2024-05-30 DIAGNOSIS — F33.42 RECURRENT MAJOR DEPRESSIVE DISORDER, IN FULL REMISSION (CMS-HCC): ICD-10-CM

## 2024-05-30 DIAGNOSIS — M99.01 SEGMENTAL AND SOMATIC DYSFUNCTION OF CERVICAL REGION: ICD-10-CM

## 2024-05-30 DIAGNOSIS — M54.6 CHRONIC BILATERAL THORACIC BACK PAIN: ICD-10-CM

## 2024-05-30 DIAGNOSIS — M99.05 SEGMENTAL AND SOMATIC DYSFUNCTION OF PELVIC REGION: ICD-10-CM

## 2024-05-30 DIAGNOSIS — M54.2 NECK PAIN: ICD-10-CM

## 2024-05-30 DIAGNOSIS — M99.02 SEGMENTAL AND SOMATIC DYSFUNCTION OF THORACIC REGION: ICD-10-CM

## 2024-05-30 DIAGNOSIS — M99.03 SEGMENTAL AND SOMATIC DYSFUNCTION OF LUMBAR REGION: Primary | ICD-10-CM

## 2024-05-30 PROCEDURE — 98941 CHIROPRACT MANJ 3-4 REGIONS: CPT | Performed by: CHIROPRACTOR

## 2024-05-30 RX ORDER — LAMOTRIGINE 150 MG/1
150 TABLET ORAL 2 TIMES DAILY
Qty: 180 TABLET | Refills: 0 | Status: SHIPPED | OUTPATIENT
Start: 2024-05-30 | End: 2024-06-05 | Stop reason: SDUPTHER

## 2024-05-30 ASSESSMENT — ENCOUNTER SYMPTOMS
ABDOMINAL PAIN: 0
DIFFICULTY URINATING: 0
SHORTNESS OF BREATH: 0
ARTHRALGIAS: 1
CHILLS: 0
MYALGIAS: 1
NAUSEA: 0
AGITATION: 0
FEVER: 0
CONFUSION: 0
NECK STIFFNESS: 1
FACIAL ASYMMETRY: 0
BACK PAIN: 1
NECK PAIN: 1

## 2024-05-30 NOTE — PROGRESS NOTES
Subjective   Patient ID: Naomi Dunaway is a 69 y.o. female who presents today for the treatment of pain involving their neck, upper back and L sacral pains.    This is visit 9 of the 2024 calendar year. Medicare.     Fall risk: None. No falls in the last 6 months. (She does had Hx falling in the past)    HPI - Overall not bad. Happy to have this apt scheduled to keep her more flexible. Same L>R neck and shoulder and L hip/sacrum soreness, aching pain and stiffness. No new injuries or acute exacerbations.     Last treatment 5/16/24: She is still getting organized in her new home, but overall she is doing well. Trying to pace her self. No yard work this year with the [employer], which is good. Still most pain and tension into the neck and upper back as well as the L hip.      5/1/24: she is finally basically moved into her new home and starting to settle in.  She continues to have an aching pain over the left sacrum and into the glutes and left upper leg region.  The pain is not acute but she does notice it daily.  She also continues to have pain across the upper back neck and shoulders.  No new injuries.  No changes to her medical history.    4/1/24: no significant changes to report today.  Specifically regarding her pain.  However she has had a lot of stress with her dog being sick, her  having a flareup in his pain and also in the midst of closing on their home and moving to a new condo.  She is planning to do a lot of of packing and moving next week and is anticipating that she will likely be more sore as a result.    3/15/24: physically she is holding up okay, however she has had a lot of increased stress the last 2 weeks.  She and her  bought a new condo and are in the process of now selling their home.  She also is dealing with a cancer diagnosis of her dog and found out today she needs to take him to the emergency vet this afternoon.    We will continue to treat her historically tight and painful  areas including the neck, upper back/traps and lower back/hips.  She is slowly getting back to work.     3/6/24: she reports that she had a really great trip to [place] with her  where they stayed for a month.  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  Overall she is doing pretty good.  She did do a trial class of Pilates and really enjoyed it.  She is thinking of joining club [employer] in [place] where she can do this form of exercise a little more regularly.    She like to treat her chronically tight neck upper back and shoulders as well as the left sacrum and hip.  Although the knee still has limitations and stiffness it is not actively/acutely bothering her today.    1/23/24: she is leaving for her road trip to [place] on Thursday/Friday depending on the weather with her  and their dog.  She would like to focus on the neck and shoulders but is also having some slight increase in symptoms over the left piriformis/gluteal/sacral region.  This tends to get worse when sitting or in a long car ride and she was nervous before she left and made this appointment.  No numbness and tingling into the leg and no debilitating pain.  She still has full capacity to walk and ambulate safely without any acute pain issues.    1/15/24: She is doing okay. She cancelled the trip to [place] but is looking forward to a trip to [place] for about 4-5 weeks. Still a lotof tension/tightness into the neck upper back/shoulder and L>R sacrum/glute, but no acute flare-ups.     1/2/24: She has been hosting a lot for the holidays and has been feeing some more tension and tightness/pain in to the L>R upper back and neck. She is also leaving for a trip to [place], [place] on Saturday. Luckily, the L sacrum has not bee too bad. She is hoping that a treatment will help keep her comfortable during her travels.   __________  2022:    She got the MRI of her head which was negative.  It just showed some typical degenerative changes for her age.     R knee (arthroscopic meniscus) surgery for 12/7/21 with Dr. Larkin.  __________   2021:   Additional info: She has recently seen and ortho and had MRI of her R knee. Confirmed meniscus injury. Saw Dr. Lujan on 8/5 who also suggested surgery. She is being refereed to Dr. Rincon for a surgical consultation. However, at this time Naomi is not comfortable with surgery and feels as if she can prolong the need for this for a while. She may cancel her consultation with Dr. Rincon until she is ready to commit to surgical procedure.    Review of Systems   Constitutional:  Negative for chills and fever.   HENT:  Negative for congestion and sneezing.    Eyes:  Negative for visual disturbance.   Respiratory:  Negative for shortness of breath.         +Asthma (managed with inhalers)   Cardiovascular:  Negative for chest pain.   Gastrointestinal:  Negative for abdominal pain and nausea.   Endocrine: Negative for polyuria.   Genitourinary:  Negative for difficulty urinating.   Musculoskeletal:  Positive for arthralgias, back pain, myalgias, neck pain and neck stiffness.        + Takes Cymbalta and Rx Flexeril for pain, +OA of knee (past surgery)   Neurological:  Negative for facial asymmetry.   Psychiatric/Behavioral:  Negative for agitation and confusion.         +Anxiety/Depression     Objective   Observation : normal gait, forward head posture, and hyperkyphosis    Physical Exam  Examination findings (palpation & ROM): Tenderness and hypertonicity over the upper trapezius, cervical paraspinals, levator scapula, lumbar paraspinals, L piriformis, L gluteals, and iliolumbar ligament on the left side.     Segmental joint dysfunction was identified in the following areas using motion palpation and/or pain provocation assessment:  Cervical: C0-C6 (manual traction and joint mobilizations)  Thoracic: T1-T5   Lumbopelvic: L SIJ and L4-L5 (Side-lying traction and end range  stretching/mobilizations).     Today's treatment:  Performed spinal manipulation to the regions of segmental dysfunction identified on examination using age-appropriate and injury specific force, and manual diversified technique.     Brief supine IC/MFR to B UT, lev scap and cervical paraspinals.  Brief prone ischemic compression and stretching to the left greater than right SI joint and lower back.           Treatment Plan:   The patient and I discussed the risks and benefits of chiropractic care. Based on the patient's subjective complaints along with the examination findings, it is advised that a course of chiropractic treatment by initiated. Consent for care was given both written and orally by the patient. The patient tolerated today's treatment with little or no additional discomfort and was instructed to contact the office for questions or concerns.     Treatment Frequency: Will see/treat patient every 2-4 weeks as therapeutic benefit is sustained, then frequency will be reduced to as needed for symptom management or as needed for acute flare-ups/exacerbation.     Please note: Voice-to-text software was used when completing this note.  While the note was proofread, portions may include grammatical errors.  Please contact me with any questions/concerns as it relates to these types of errors.

## 2024-06-05 ENCOUNTER — TELEMEDICINE (OUTPATIENT)
Dept: BEHAVIORAL HEALTH | Facility: CLINIC | Age: 69
End: 2024-06-05
Payer: MEDICARE

## 2024-06-05 DIAGNOSIS — F41.9 ANXIETY: ICD-10-CM

## 2024-06-05 DIAGNOSIS — F33.42 RECURRENT MAJOR DEPRESSIVE DISORDER, IN FULL REMISSION (CMS-HCC): ICD-10-CM

## 2024-06-05 PROCEDURE — 1036F TOBACCO NON-USER: CPT | Performed by: PSYCHIATRY & NEUROLOGY

## 2024-06-05 PROCEDURE — 99214 OFFICE O/P EST MOD 30 MIN: CPT | Performed by: PSYCHIATRY & NEUROLOGY

## 2024-06-05 PROCEDURE — 1160F RVW MEDS BY RX/DR IN RCRD: CPT | Performed by: PSYCHIATRY & NEUROLOGY

## 2024-06-05 PROCEDURE — 1157F ADVNC CARE PLAN IN RCRD: CPT | Performed by: PSYCHIATRY & NEUROLOGY

## 2024-06-05 PROCEDURE — 1159F MED LIST DOCD IN RCRD: CPT | Performed by: PSYCHIATRY & NEUROLOGY

## 2024-06-05 RX ORDER — DULOXETIN HYDROCHLORIDE 30 MG/1
30 CAPSULE, DELAYED RELEASE ORAL DAILY
Qty: 90 CAPSULE | Refills: 1 | Status: SHIPPED | OUTPATIENT
Start: 2024-06-05 | End: 2024-12-02

## 2024-06-05 RX ORDER — FLUOXETINE HYDROCHLORIDE 40 MG/1
40 CAPSULE ORAL DAILY
Qty: 90 CAPSULE | Refills: 1 | Status: SHIPPED | OUTPATIENT
Start: 2024-06-05 | End: 2024-12-02

## 2024-06-05 RX ORDER — LAMOTRIGINE 150 MG/1
150 TABLET ORAL 2 TIMES DAILY
Qty: 180 TABLET | Refills: 0 | Status: SHIPPED | OUTPATIENT
Start: 2024-06-05 | End: 2024-09-03

## 2024-06-05 ASSESSMENT — PATIENT HEALTH QUESTIONNAIRE - PHQ9
SUM OF ALL RESPONSES TO PHQ9 QUESTIONS 1 AND 2: 2
10. IF YOU CHECKED OFF ANY PROBLEMS, HOW DIFFICULT HAVE THESE PROBLEMS MADE IT FOR YOU TO DO YOUR WORK, TAKE CARE OF THINGS AT HOME, OR GET ALONG WITH OTHER PEOPLE: SOMEWHAT DIFFICULT
1. LITTLE INTEREST OR PLEASURE IN DOING THINGS: NOT AT ALL
2. FEELING DOWN, DEPRESSED OR HOPELESS: MORE THAN HALF THE DAYS

## 2024-06-05 ASSESSMENT — ENCOUNTER SYMPTOMS
DYSPHORIC MOOD: 0
NERVOUS/ANXIOUS: 1

## 2024-06-05 NOTE — PROGRESS NOTES
"Adult Ambulatory Psychiatry Progress Note      Assessment/Plan     Impression:  Naomi Dunaway is a 69 y.o. female domiciled with , retired who presents for follow up with [facility].    Plan:        Depression/anxiety - duloxetine 30mg daily, fluoxetine 40mg daily, lamotrigine 150mg BID, lorazepam 0.5mg TID prn panic, c/w therapy, c/w med ed, psycho ed and supportive psychotherapy, f/u 3 months         Subjective   HPI:  Pt arrived on time. Mood \"down\". Her  has a mass in his lung. They aren't sure if it's mets or a separate cancer. He's still working and golfing. She's had more anxiety. Sleeping ok. Appetite ok. Taking medicine as prescribed. Denies significant SE. They moved to the [facility]. Pt inquired if she needed to refine meds due to age. Advised that duloxetine and fluoxetine both increase serotonin and can increase risk of serotonin syndrome. Given current stresses pt will wait to optimize meds.          Review of Systems   Psychiatric/Behavioral:  Negative for dysphoric mood and suicidal ideas. The patient is nervous/anxious.      OARRS:  Vinicius Acharya MD on 6/5/2024 12:26 PM  I have personally reviewed the OARRS report for Naomi Dunaway. I have considered the risks of abuse, dependence, addiction and diversion    Is the patient prescribed a combination of a benzodiazepine and opioid?  No        Objective   Mental Status Exam:  General Appearance: Well groomed, appropriate eye contact  Attitude/Behavior: Cooperative  Motor: No psychomotor agitation or retardation, no tremor or other abnormal movements  Speech: Normal rate, volume, prosody  Mood: \"down\"  Affect: Constricted  Thought Process: Linear, goal directed  Thought Associations: No loosening of associations  Thought Content: Normal  Perception: No perceptual abnormalities noted  Insight: Intact  Judgement: Intact    Vitals:  There were no vitals filed for this visit.    Current Medications:  Current Outpatient " Medications on File Prior to Visit   Medication Sig Dispense Refill    Advair Diskus 100-50 mcg/dose diskus inhaler INHALE 1 PUFF BY MOUTH TWICE A DAY (MORNING AND EVENING ABOUT 12 HOURS APART) (Patient not taking: Reported on 4/2/2024) 60 each 2    albuterol 90 mcg/actuation inhaler Inhale 1-2 puffs every 6 hours if needed.      amphetamine-dextroamphetamine (Adderall) 10 mg tablet Take by mouth.      BIOTIN ORAL Take by mouth. daily      CALCIUM ORAL Take 2,000 mg by mouth 1 (one) time each day. Calcium 250 mg Caps; 2000 mg daily      cholecalciferol (Vitamin D-3) 25 MCG (1000 UT) capsule Take 1 capsule (25 mcg) by mouth.      cyclobenzaprine (Flexeril) 10 mg tablet prn      folic acid 0.8 mg capsule Unsure of the strength      guaiFENesin (Mucinex) 1,200 mg tablet extended release 12hr Take 1 tablet (1,200 mg) by mouth every 12 hours.      levomefolate-algal oil 15-90.314 mg capsule Take 1 capsule by mouth 1 (one) time each day.      LORazepam (Ativan) 0.5 mg tablet Take 1 tablet (0.5 mg) by mouth 3 times a day as needed for anxiety for up to 10 days. 30 tablet 0    mometasone furoate, bulk, 100 % powder 2 mg by sinus irrigation route once daily. 60 g 11    pantoprazole (ProtoNix) 40 mg EC tablet Take 1 tablet (40 mg) by mouth once daily.      scopolamine (Transderm-Scop) 1 mg over 3 days patch 3 day Place on the skin.      [DISCONTINUED] DULoxetine (Cymbalta) 30 mg DR capsule Take 1 capsule (30 mg) by mouth once daily. 90 capsule 0    [DISCONTINUED] FLUoxetine (PROzac) 40 mg capsule Take 1 capsule (40 mg) by mouth once daily. 90 capsule 0    [DISCONTINUED] lamoTRIgine (LaMICtal) 150 mg tablet Take 1 tablet (150 mg) by mouth 2 times a day. 180 tablet 0    [DISCONTINUED] lamoTRIgine (LaMICtal) 150 mg tablet Take 1 tablet (150 mg) by mouth 2 times a day. 180 tablet 0     Current Facility-Administered Medications on File Prior to Visit   Medication Dose Route Frequency Provider Last Rate Last Admin    cyanocobalamin  (Vitamin B-12) injection 1,000 mcg  1,000 mcg intramuscular q7 days Sherly Soares MD   1,000 mcg at 10/03/23 1200    cyanocobalamin (Vitamin B-12) injection 1,000 mcg  1,000 mcg intramuscular q14 days Sherly Soares MD   1,000 mcg at 10/31/23 1353    cyanocobalamin (Vitamin B-12) injection 1,000 mcg  1,000 mcg intramuscular q14 days Sherly Soares MD   1,000 mcg at 05/24/24 1147       Orders:  Diagnoses and all orders for this visit:  Recurrent major depressive disorder, in full remission (CMS-HCC)  -     DULoxetine (Cymbalta) 30 mg DR capsule; Take 1 capsule (30 mg) by mouth once daily.  -     FLUoxetine (PROzac) 40 mg capsule; Take 1 capsule (40 mg) by mouth once daily.  -     lamoTRIgine (LaMICtal) 150 mg tablet; Take 1 tablet (150 mg) by mouth 2 times a day.  Anxiety  -     DULoxetine (Cymbalta) 30 mg DR capsule; Take 1 capsule (30 mg) by mouth once daily.  -     FLUoxetine (PROzac) 40 mg capsule; Take 1 capsule (40 mg) by mouth once daily.            Time Spent:    Prep time: 2  Direct patient time: 23  Documentation time: 5  Total time: 30 min    Next Appointment:  Follow up in 3 months (on 9/3/2024).

## 2024-06-12 ENCOUNTER — APPOINTMENT (OUTPATIENT)
Dept: PRIMARY CARE | Facility: CLINIC | Age: 69
End: 2024-06-12
Payer: MEDICARE

## 2024-06-18 ENCOUNTER — APPOINTMENT (OUTPATIENT)
Dept: PRIMARY CARE | Facility: CLINIC | Age: 69
End: 2024-06-18
Payer: MEDICARE

## 2024-06-18 PROCEDURE — 96372 THER/PROPH/DIAG INJ SC/IM: CPT | Performed by: INTERNAL MEDICINE

## 2024-06-28 ENCOUNTER — ALLIED HEALTH (OUTPATIENT)
Dept: INTEGRATIVE MEDICINE | Facility: CLINIC | Age: 69
End: 2024-06-28
Payer: MEDICARE

## 2024-06-28 DIAGNOSIS — M99.05 SEGMENTAL AND SOMATIC DYSFUNCTION OF PELVIC REGION: ICD-10-CM

## 2024-06-28 DIAGNOSIS — M99.02 SEGMENTAL AND SOMATIC DYSFUNCTION OF THORACIC REGION: ICD-10-CM

## 2024-06-28 DIAGNOSIS — M99.01 SEGMENTAL AND SOMATIC DYSFUNCTION OF CERVICAL REGION: ICD-10-CM

## 2024-06-28 DIAGNOSIS — G89.29 CHRONIC BILATERAL THORACIC BACK PAIN: ICD-10-CM

## 2024-06-28 DIAGNOSIS — M99.03 SEGMENTAL AND SOMATIC DYSFUNCTION OF LUMBAR REGION: Primary | ICD-10-CM

## 2024-06-28 DIAGNOSIS — M99.00 SEGMENTAL AND SOMATIC DYSFUNCTION OF HEAD REGION: ICD-10-CM

## 2024-06-28 DIAGNOSIS — M50.30 DDD (DEGENERATIVE DISC DISEASE), CERVICAL: ICD-10-CM

## 2024-06-28 DIAGNOSIS — K21.9 GASTROESOPHAGEAL REFLUX DISEASE, UNSPECIFIED WHETHER ESOPHAGITIS PRESENT: Primary | ICD-10-CM

## 2024-06-28 DIAGNOSIS — M99.04 SEGMENTAL AND SOMATIC DYSFUNCTION OF SACRAL REGION: ICD-10-CM

## 2024-06-28 DIAGNOSIS — M54.2 NECK PAIN: ICD-10-CM

## 2024-06-28 DIAGNOSIS — M53.3 SACRAL BACK PAIN: ICD-10-CM

## 2024-06-28 DIAGNOSIS — M54.6 CHRONIC BILATERAL THORACIC BACK PAIN: ICD-10-CM

## 2024-06-28 PROCEDURE — 98941 CHIROPRACT MANJ 3-4 REGIONS: CPT | Performed by: CHIROPRACTOR

## 2024-06-28 RX ORDER — PANTOPRAZOLE SODIUM 40 MG/1
40 TABLET, DELAYED RELEASE ORAL DAILY
Qty: 90 TABLET | Refills: 1 | Status: SHIPPED | OUTPATIENT
Start: 2024-06-28

## 2024-06-28 ASSESSMENT — ENCOUNTER SYMPTOMS
ABDOMINAL PAIN: 0
NECK STIFFNESS: 1
FEVER: 0
SHORTNESS OF BREATH: 0
CONFUSION: 0
CHILLS: 0
ARTHRALGIAS: 1
FACIAL ASYMMETRY: 0
NECK PAIN: 1
MYALGIAS: 1
DIFFICULTY URINATING: 0
BACK PAIN: 1
NAUSEA: 0
AGITATION: 0

## 2024-06-28 NOTE — PROGRESS NOTES
Subjective   Patient ID: Naomi Dunaway is a 69 y.o. female who presents today for the treatment of pain involving their neck, upper back and L sacral pains.    This is visit 10 of the 2024 calendar year. Medicare.     Fall risk: None. No falls in the last 6 months. (She does had Hx falling in the past)    HPI - She was out of town for a HS reunion and was able spend some time with family. However with the travel her neck and lower back have been more symptomatic. No new injuries.     Last treatment 5/30/24: Overall not bad. Happy to have this apt scheduled to keep her more flexible. Same L>R neck and shoulder and L hip/sacrum soreness, aching pain and stiffness. No new injuries or acute exacerbations.     5/16/24: She is still getting organized in her new home, but overall she is doing well. Trying to pace her self. No yard work this year with the condo, which is good. Still most pain and tension into the neck and upper back as well as the L hip.      5/1/24: she is finally basically moved into her new home and starting to settle in.  She continues to have an aching pain over the left sacrum and into the glutes and left upper leg region.  The pain is not acute but she does notice it daily.  She also continues to have pain across the upper back neck and shoulders.  No new injuries.  No changes to her medical history.    4/1/24: no significant changes to report today.  Specifically regarding her pain.  However she has had a lot of stress with her dog being sick, her  having a flareup in his pain and also in the midst of closing on their home and moving to a new condo.  She is planning to do a lot of of packing and moving next week and is anticipating that she will likely be more sore as a result.    3/15/24: physically she is holding up okay, however she has had a lot of increased stress the last 2 weeks.  She and her  bought a new condo and are in the process of now selling their home.  She also is  dealing with a cancer diagnosis of her dog and found out today she needs to take him to the emergency vet this afternoon.    We will continue to treat her historically tight and painful areas including the neck, upper back/traps and lower back/hips.  She is slowly getting back to work.     3/6/24: she reports that she had a really great trip to [place] with her  where they stayed for a month.  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  Overall she is doing pretty good.  She did do a trial class of Pilates and really enjoyed it.  She is thinking of joining [employer] in [place] where she can do this form of exercise a little more regularly.    She like to treat her chronically tight neck upper back and shoulders as well as the left sacrum and hip.  Although the knee still has limitations and stiffness it is not actively/acutely bothering her today.    1/23/24: she is leaving for her road trip to [place] on Thursday/Friday depending on the weather with her  and their dog.  She would like to focus on the neck and shoulders but is also having some slight increase in symptoms over the left piriformis/gluteal/sacral region.  This tends to get worse when sitting or in a long car ride and she was nervous before she left and made this appointment.  No numbness and tingling into the leg and no debilitating pain.  She still has full capacity to walk and ambulate safely without any acute pain issues.    1/15/24: She is doing okay. She cancelled the trip to [place] but is looking forward to a trip to [place] for about 4-5 weeks. Still a lotof tension/tightness into the neck upper back/shoulder and L>R sacrum/glute, but no acute flare-ups.     1/2/24: She has been hosting a lot for the holidays and has been feeing some more tension and tightness/pain in to the L>R upper back and neck. She is also leaving for a trip to [place], [place] on Saturday. Luckily,  the L sacrum has not bee too bad. She is hoping that a treatment will help keep her comfortable during her travels.   __________  2022:    She got the MRI of her head which was negative. It just showed some typical degenerative changes for her age.     R knee (arthroscopic meniscus) surgery for 12/7/21 with Dr. Larkin.  __________   2021:   Additional info: She has recently seen and ortho and had MRI of her R knee. Confirmed meniscus injury. Saw Dr. Lujan on 8/5 who also suggested surgery. She is being refereed to Dr. Rincon for a surgical consultation. However, at this time Naomi is not comfortable with surgery and feels as if she can prolong the need for this for a while. She may cancel her consultation with Dr. Rincno until she is ready to commit to surgical procedure.    Review of Systems   Constitutional:  Negative for chills and fever.   HENT:  Negative for congestion and sneezing.    Eyes:  Negative for visual disturbance.   Respiratory:  Negative for shortness of breath.         +Asthma (managed with inhalers)   Cardiovascular:  Negative for chest pain.   Gastrointestinal:  Negative for abdominal pain and nausea.   Endocrine: Negative for polyuria.   Genitourinary:  Negative for difficulty urinating.   Musculoskeletal:  Positive for arthralgias, back pain, myalgias, neck pain and neck stiffness.        + Takes Cymbalta and Rx Flexeril for pain, +OA of knee (past surgery)   Neurological:  Negative for facial asymmetry.   Psychiatric/Behavioral:  Negative for agitation and confusion.         +Anxiety/Depression     Objective   Observation : normal gait, forward head posture, and hyperkyphosis    Physical Exam  Examination findings (palpation & ROM): Tenderness and hypertonicity over the upper trapezius, cervical paraspinals, levator scapula, lumbar paraspinals, L piriformis, L gluteals, and iliolumbar ligament on the left side.     Segmental joint dysfunction was identified in the following areas using motion  palpation and/or pain provocation assessment:  Cervical: C0-C6 (manual traction and joint mobilizations)  Thoracic: T1-T5   Lumbopelvic: L SIJ and L4-L5 (Side-lying traction and end range stretching/mobilizations, gentle drop table).     Today's treatment:  Performed spinal manipulation to the regions of segmental dysfunction identified on examination using age-appropriate and injury specific force, and manual diversified technique.     Brief supine IC/MFR to B UT, lev scap and cervical paraspinals.  Brief prone ischemic compression and stretching to the left greater than right SI joint and lower back.           Treatment Plan:   The patient and I discussed the risks and benefits of chiropractic care. Based on the patient's subjective complaints along with the examination findings, it is advised that a course of chiropractic treatment by initiated. Consent for care was given both written and orally by the patient. The patient tolerated today's treatment with little or no additional discomfort and was instructed to contact the office for questions or concerns.     Treatment Frequency: Will see/treat patient every 2-4 weeks as therapeutic benefit is sustained, then frequency will be reduced to as needed for symptom management or as needed for acute flare-ups/exacerbation.     Please note: Voice-to-text software was used when completing this note.  While the note was proofread, portions may include grammatical errors.  Please contact me with any questions/concerns as it relates to these types of errors.

## 2024-07-03 ENCOUNTER — APPOINTMENT (OUTPATIENT)
Dept: PRIMARY CARE | Facility: CLINIC | Age: 69
End: 2024-07-03
Payer: MEDICARE

## 2024-07-12 ENCOUNTER — PATIENT MESSAGE (OUTPATIENT)
Dept: BEHAVIORAL HEALTH | Facility: CLINIC | Age: 69
End: 2024-07-12
Payer: MEDICARE

## 2024-07-12 DIAGNOSIS — F41.9 ANXIETY: ICD-10-CM

## 2024-07-12 DIAGNOSIS — F33.42 RECURRENT MAJOR DEPRESSIVE DISORDER, IN FULL REMISSION (CMS-HCC): ICD-10-CM

## 2024-07-15 RX ORDER — DULOXETIN HYDROCHLORIDE 20 MG/1
20 CAPSULE, DELAYED RELEASE ORAL 2 TIMES DAILY
Qty: 180 CAPSULE | Refills: 0 | Status: SHIPPED | OUTPATIENT
Start: 2024-07-15 | End: 2024-10-13

## 2024-07-18 ENCOUNTER — APPOINTMENT (OUTPATIENT)
Dept: PRIMARY CARE | Facility: CLINIC | Age: 69
End: 2024-07-18
Payer: COMMERCIAL

## 2024-07-22 PROBLEM — E53.8 COBALAMIN DEFICIENCY: Status: ACTIVE | Noted: 2023-11-06

## 2024-07-22 PROBLEM — R20.8 DYSESTHESIA: Status: ACTIVE | Noted: 2024-07-22

## 2024-07-22 PROBLEM — S93.492A SPRAIN OF TALOFIBULAR LIGAMENT OF LEFT ANKLE: Status: ACTIVE | Noted: 2024-07-22

## 2024-07-22 PROBLEM — E55.9 VITAMIN D DEFICIENCY: Status: ACTIVE | Noted: 2024-07-22

## 2024-07-24 ENCOUNTER — LAB (OUTPATIENT)
Dept: LAB | Facility: LAB | Age: 69
End: 2024-07-24
Payer: COMMERCIAL

## 2024-07-24 ENCOUNTER — APPOINTMENT (OUTPATIENT)
Dept: PRIMARY CARE | Facility: CLINIC | Age: 69
End: 2024-07-24
Payer: MEDICARE

## 2024-07-24 ENCOUNTER — OFFICE VISIT (OUTPATIENT)
Dept: OTOLARYNGOLOGY | Facility: CLINIC | Age: 69
End: 2024-07-24
Payer: MEDICARE

## 2024-07-24 VITALS
HEIGHT: 61 IN | WEIGHT: 103.8 LBS | SYSTOLIC BLOOD PRESSURE: 120 MMHG | TEMPERATURE: 97.3 F | DIASTOLIC BLOOD PRESSURE: 69 MMHG | HEART RATE: 74 BPM | OXYGEN SATURATION: 98 % | BODY MASS INDEX: 19.6 KG/M2

## 2024-07-24 VITALS — BODY MASS INDEX: 18.86 KG/M2 | WEIGHT: 102.5 LBS | HEIGHT: 62 IN

## 2024-07-24 DIAGNOSIS — J32.2 CHRONIC ETHMOIDAL SINUSITIS: Primary | ICD-10-CM

## 2024-07-24 DIAGNOSIS — Z12.31 ENCOUNTER FOR SCREENING MAMMOGRAM FOR BREAST CANCER: ICD-10-CM

## 2024-07-24 DIAGNOSIS — Z00.00 HEALTHCARE MAINTENANCE: ICD-10-CM

## 2024-07-24 DIAGNOSIS — K21.9 GASTROESOPHAGEAL REFLUX DISEASE, UNSPECIFIED WHETHER ESOPHAGITIS PRESENT: ICD-10-CM

## 2024-07-24 DIAGNOSIS — R09.82 POST-NASAL DRAINAGE: ICD-10-CM

## 2024-07-24 DIAGNOSIS — E78.2 MIXED HYPERLIPIDEMIA: ICD-10-CM

## 2024-07-24 DIAGNOSIS — Z00.00 HEALTH MAINTENANCE EXAMINATION: ICD-10-CM

## 2024-07-24 DIAGNOSIS — Z00.00 ROUTINE GENERAL MEDICAL EXAMINATION AT HEALTH CARE FACILITY: Primary | ICD-10-CM

## 2024-07-24 DIAGNOSIS — R44.8 FACIAL PRESSURE: ICD-10-CM

## 2024-07-24 DIAGNOSIS — J34.2 DEVIATED NASAL SEPTUM: ICD-10-CM

## 2024-07-24 DIAGNOSIS — H90.3 SENSORINEURAL HEARING LOSS (SNHL) OF BOTH EARS: ICD-10-CM

## 2024-07-24 LAB
ALBUMIN SERPL BCP-MCNC: 4.4 G/DL (ref 3.4–5)
ALP SERPL-CCNC: 64 U/L (ref 33–136)
ALT SERPL W P-5'-P-CCNC: 15 U/L (ref 7–45)
ANION GAP SERPL CALC-SCNC: 11 MMOL/L (ref 10–20)
AST SERPL W P-5'-P-CCNC: 20 U/L (ref 9–39)
BILIRUB SERPL-MCNC: 0.4 MG/DL (ref 0–1.2)
BUN SERPL-MCNC: 13 MG/DL (ref 6–23)
CALCIUM SERPL-MCNC: 9.8 MG/DL (ref 8.6–10.6)
CHLORIDE SERPL-SCNC: 102 MMOL/L (ref 98–107)
CHOLEST SERPL-MCNC: 207 MG/DL (ref 0–199)
CHOLESTEROL/HDL RATIO: 3.1
CO2 SERPL-SCNC: 33 MMOL/L (ref 21–32)
CREAT SERPL-MCNC: 0.77 MG/DL (ref 0.5–1.05)
EGFRCR SERPLBLD CKD-EPI 2021: 84 ML/MIN/1.73M*2
ERYTHROCYTE [DISTWIDTH] IN BLOOD BY AUTOMATED COUNT: 11.9 % (ref 11.5–14.5)
GLUCOSE SERPL-MCNC: 99 MG/DL (ref 74–99)
HCT VFR BLD AUTO: 37.8 % (ref 36–46)
HDLC SERPL-MCNC: 67.4 MG/DL
HGB BLD-MCNC: 12.2 G/DL (ref 12–16)
LDLC SERPL CALC-MCNC: 122 MG/DL
MCH RBC QN AUTO: 29.5 PG (ref 26–34)
MCHC RBC AUTO-ENTMCNC: 32.3 G/DL (ref 32–36)
MCV RBC AUTO: 92 FL (ref 80–100)
NON HDL CHOLESTEROL: 140 MG/DL (ref 0–149)
NRBC BLD-RTO: 0 /100 WBCS (ref 0–0)
PLATELET # BLD AUTO: 468 X10*3/UL (ref 150–450)
POTASSIUM SERPL-SCNC: 4.8 MMOL/L (ref 3.5–5.3)
PROT SERPL-MCNC: 6.5 G/DL (ref 6.4–8.2)
RBC # BLD AUTO: 4.13 X10*6/UL (ref 4–5.2)
SODIUM SERPL-SCNC: 141 MMOL/L (ref 136–145)
TRIGL SERPL-MCNC: 87 MG/DL (ref 0–149)
TSH SERPL-ACNC: 1.56 MIU/L (ref 0.44–3.98)
VLDL: 17 MG/DL (ref 0–40)
WBC # BLD AUTO: 5.7 X10*3/UL (ref 4.4–11.3)

## 2024-07-24 PROCEDURE — 3008F BODY MASS INDEX DOCD: CPT | Performed by: OTOLARYNGOLOGY

## 2024-07-24 PROCEDURE — 99213 OFFICE O/P EST LOW 20 MIN: CPT | Performed by: INTERNAL MEDICINE

## 2024-07-24 PROCEDURE — 1170F FXNL STATUS ASSESSED: CPT | Performed by: INTERNAL MEDICINE

## 2024-07-24 PROCEDURE — 3008F BODY MASS INDEX DOCD: CPT | Performed by: INTERNAL MEDICINE

## 2024-07-24 PROCEDURE — 1036F TOBACCO NON-USER: CPT | Performed by: INTERNAL MEDICINE

## 2024-07-24 PROCEDURE — 1159F MED LIST DOCD IN RCRD: CPT | Performed by: INTERNAL MEDICINE

## 2024-07-24 PROCEDURE — 1123F ACP DISCUSS/DSCN MKR DOCD: CPT | Performed by: OTOLARYNGOLOGY

## 2024-07-24 PROCEDURE — 1123F ACP DISCUSS/DSCN MKR DOCD: CPT | Performed by: INTERNAL MEDICINE

## 2024-07-24 PROCEDURE — 1157F ADVNC CARE PLAN IN RCRD: CPT | Performed by: INTERNAL MEDICINE

## 2024-07-24 PROCEDURE — 1160F RVW MEDS BY RX/DR IN RCRD: CPT | Performed by: OTOLARYNGOLOGY

## 2024-07-24 PROCEDURE — G0442 ANNUAL ALCOHOL SCREEN 15 MIN: HCPCS | Performed by: INTERNAL MEDICINE

## 2024-07-24 PROCEDURE — 1160F RVW MEDS BY RX/DR IN RCRD: CPT | Performed by: INTERNAL MEDICINE

## 2024-07-24 PROCEDURE — 31231 NASAL ENDOSCOPY DX: CPT | Performed by: OTOLARYNGOLOGY

## 2024-07-24 PROCEDURE — 1159F MED LIST DOCD IN RCRD: CPT | Performed by: OTOLARYNGOLOGY

## 2024-07-24 PROCEDURE — G0439 PPPS, SUBSEQ VISIT: HCPCS | Performed by: INTERNAL MEDICINE

## 2024-07-24 PROCEDURE — 93000 ELECTROCARDIOGRAM COMPLETE: CPT | Performed by: INTERNAL MEDICINE

## 2024-07-24 PROCEDURE — 99213 OFFICE O/P EST LOW 20 MIN: CPT | Performed by: OTOLARYNGOLOGY

## 2024-07-24 PROCEDURE — 1126F AMNT PAIN NOTED NONE PRSNT: CPT | Performed by: OTOLARYNGOLOGY

## 2024-07-24 PROCEDURE — 96372 THER/PROPH/DIAG INJ SC/IM: CPT | Performed by: INTERNAL MEDICINE

## 2024-07-24 PROCEDURE — 1157F ADVNC CARE PLAN IN RCRD: CPT | Performed by: OTOLARYNGOLOGY

## 2024-07-24 ASSESSMENT — ACTIVITIES OF DAILY LIVING (ADL)
TAKING_MEDICATION: INDEPENDENT
GROCERY_SHOPPING: INDEPENDENT
DOING_HOUSEWORK: INDEPENDENT
BATHING: INDEPENDENT
DRESSING: INDEPENDENT
MANAGING_FINANCES: INDEPENDENT

## 2024-07-24 ASSESSMENT — ENCOUNTER SYMPTOMS
BLOOD IN STOOL: 0
COUGH: 0
APPETITE CHANGE: 0
UNEXPECTED WEIGHT CHANGE: 0
NAUSEA: 0
LIGHT-HEADEDNESS: 0
FATIGUE: 0
ACTIVITY CHANGE: 0
DIFFICULTY URINATING: 0
TROUBLE SWALLOWING: 0
ARTHRALGIAS: 0
HEADACHES: 0
DIZZINESS: 0
CHEST TIGHTNESS: 0
VOICE CHANGE: 0
TREMORS: 0
HEMATURIA: 0
WHEEZING: 0
CONSTIPATION: 0
PALPITATIONS: 0
DIARRHEA: 0
VOMITING: 0

## 2024-07-24 ASSESSMENT — PAIN SCALES - GENERAL: PAINLEVEL: 0-NO PAIN

## 2024-07-24 NOTE — PROGRESS NOTES
Chief Complaint:  1. Chronic sinusitis   2. Posterior nasal drainage   3. Nasal airway obstruction, deviated nasal septum, inferior turbinate hypertrophy  4. Facial pressure / pain   5. Throat clearing, coughing   6. Asthma   7. Heartburn on proton pump inhibitor   8. Negative allergy testing 2023    History Of Present Illness:    Naomi Dunaway presents since last being seen 7/26/23.     In the interval time, she has been using mometasone 2 mg rinses as needed.  She has some baseline symptoms as outlined below.  In regard to the facial pain and pressure, this is present less than 50% of the time.    Main Symptoms:  Patient does not have anterior nasal drainage.     Patient has  posterior nasal drainage.    Patient does not have nasal airway obstruction.   Patient has  facial pain.    Patient has  facial pressure.    She denies decreased sense of smell.  Associated Symptoms:   Patient does not have  headaches.    Patient has throat clearing.    Patient has coughing.    Patient does not have dysphonia.   Patient does not have nasal bleeding.     Medications currently on for sinonasal symptoms:   Mometasone 2mg rinses PRN    Active Problems:  Patient Active Problem List   Diagnosis    Sacral back pain    Allergic rhinitis    Anxiety    Attention deficit hyperactivity disorder (ADHD)    Sensorineural hearing loss (SNHL)    Chronic constipation    Spondylosis of cervical region without myelopathy or radiculopathy    Hyperlipidemia    Myoclonus    Asthma (HHS-HCC)    Chronic ethmoidal sinusitis    GERD (gastroesophageal reflux disease)    Nasal congestion    Abdominal bloating    Recurrent major depressive disorder, in full remission (CMS-HCC)    Degeneration of intervertebral disc of cervical region    Deviated nasal septum    Effusion of right knee    Right knee DJD    Injury of meniscus of right knee    Internal derangement of knee    Mass of soft tissue of right lower extremity    Memory loss    Myalgia    Postural  kyphosis of thoracic region    Segmental and somatic dysfunction    Tinnitus    Tremor    Tear of lateral meniscus of right knee    Hypertrophy of both inferior nasal turbinates    Lipoma of breast    Malignant neoplasm of skin    Ptosis of both eyelids    S/P lateral meniscectomy of right knee    Primary osteoarthritis of right knee    Cobalamin deficiency    Dysesthesia    Sprain of talofibular ligament of left ankle    Vitamin D deficiency      Past Medical History:  She has a past medical history of Acute upper respiratory infection, unspecified (10/24/2018), Encounter for screening for human papillomavirus (HPV) (03/18/2014), Encounter for screening for malignant neoplasm of colon (03/18/2014), Fasciculation (01/16/2015), Other conditions influencing health status (03/18/2014), Pain in left foot (08/20/2020), Pain in right knee (09/05/2018), Personal history of other diseases of the respiratory system, Personal history of other specified conditions (11/24/2014), Personal history of other specified conditions (01/16/2015), Sprain of other ligament of left ankle, initial encounter (06/04/2020), Unspecified sprain of left foot, initial encounter (06/04/2020), and Vaginal atrophy (01/20/2023).    Surgical History:  She has a past surgical history that includes Other surgical history (06/17/2019); Other surgical history (04/24/2017); Foot surgery (04/08/2015); Other surgical history (12/20/2021); and MR angio head wo IV contrast (1/23/2015).     Family History:  Family History   Problem Relation Name Age of Onset    Depression Mother      Hypertension Mother      Other (cardiac disorder) Mother      Depression Father      Other (cardiac disorder) Father      Other (food allergy) Father      Other (seasonal allergies) Father      Other (food allergy) Sister      Other (seasonal allergies) Sister      Other (sinus problem) Sister      Atrial fibrillation Other      Cancer Other       Social History:  She reports that  she quit smoking about 28 years ago. Her smoking use included cigarettes. She has never used smokeless tobacco. She reports current alcohol use. She reports that she does not use drugs.     Allergies:  Feathers, House dust, Mold, and Sulfamethoxazole    Current Meds:    Current Outpatient Medications:     Advair Diskus 100-50 mcg/dose diskus inhaler, INHALE 1 PUFF BY MOUTH TWICE A DAY (MORNING AND EVENING ABOUT 12 HOURS APART) (Patient not taking: Reported on 4/2/2024), Disp: 60 each, Rfl: 2    albuterol 90 mcg/actuation inhaler, Inhale 1-2 puffs every 6 hours if needed., Disp: , Rfl:     amphetamine-dextroamphetamine (Adderall) 10 mg tablet, Take by mouth., Disp: , Rfl:     BIOTIN ORAL, Take by mouth. daily, Disp: , Rfl:     CALCIUM ORAL, Take 2,000 mg by mouth 1 (one) time each day. Calcium 250 mg Caps; 2000 mg daily, Disp: , Rfl:     cholecalciferol (Vitamin D-3) 25 MCG (1000 UT) capsule, Take 1 capsule (25 mcg) by mouth., Disp: , Rfl:     cyclobenzaprine (Flexeril) 10 mg tablet, prn, Disp: , Rfl:     DULoxetine (Cymbalta) 20 mg DR capsule, Take 1 capsule (20 mg) by mouth 2 times a day., Disp: 180 capsule, Rfl: 0    FLUoxetine (PROzac) 40 mg capsule, Take 1 capsule (40 mg) by mouth once daily., Disp: 90 capsule, Rfl: 1    folic acid 0.8 mg capsule, Unsure of the strength, Disp: , Rfl:     guaiFENesin (Mucinex) 1,200 mg tablet extended release 12hr, Take 1 tablet (1,200 mg) by mouth every 12 hours., Disp: , Rfl:     lamoTRIgine (LaMICtal) 150 mg tablet, Take 1 tablet (150 mg) by mouth 2 times a day., Disp: 180 tablet, Rfl: 0    levomefolate-algal oil 15-90.314 mg capsule, Take 1 capsule by mouth 1 (one) time each day., Disp: , Rfl:     LORazepam (Ativan) 0.5 mg tablet, Take 1 tablet (0.5 mg) by mouth 3 times a day as needed for anxiety for up to 10 days., Disp: 30 tablet, Rfl: 0    mometasone furoate, bulk, 100 % powder, 2 mg by sinus irrigation route once daily., Disp: 60 g, Rfl: 11    pantoprazole (ProtoNix) 40  "mg EC tablet, Take 1 tablet (40 mg) by mouth once daily., Disp: 90 tablet, Rfl: 1    scopolamine (Transderm-Scop) 1 mg over 3 days patch 3 day, Place on the skin., Disp: , Rfl:     Current Facility-Administered Medications:     cyanocobalamin (Vitamin B-12) injection 1,000 mcg, 1,000 mcg, intramuscular, q7 days, Sherly Soares MD, 1,000 mcg at 10/03/23 1200    cyanocobalamin (Vitamin B-12) injection 1,000 mcg, 1,000 mcg, intramuscular, q14 days, Sherly Soares MD, 1,000 mcg at 10/31/23 1353    cyanocobalamin (Vitamin B-12) injection 1,000 mcg, 1,000 mcg, intramuscular, q14 days, Sherly Soares MD, 1,000 mcg at 06/18/24 1111    Vitals:  Visit Vitals  Ht 1.562 m (5' 1.5\")   Wt 46.5 kg (102 lb 8 oz)   BMI 19.05 kg/m²   Smoking Status Former   BSA 1.42 m²      Physical Exam:  Nose: On external exam there are neither lesions nor asymmetry of the nasal tip/dorsum. On anterior rhinoscopy, visualization posteriorly is limited on anterior examination. For this reason, to adequately evaluate posteriorly for masses, source of epistaxis, polypoid disease, debridement, and/or signs of infections, nasal endoscopy is indicated.  (Please see procedure below.)    SINONASAL ENDOSCOPY (CPT 90505): To better evaluate the patient's symptoms, sinonasal endoscopy is indicated.  After discussion of risks and benefits, and topical decongestion and anesthesia,an endoscope was used to perform nasal endoscopy on each side.  A time out identifying the patient, the procedure, the location of the procedure and any concerns was performed prior to beginning the procedure.    Findings:  Examination of the right nasal cavity revealed a normal middle meatus and sphenoethmoid recess without pus or polyps.  Examination of the left nasal cavity revealed thick mucus draining from the middle meatus consistent with allergic mucus.  Sphenoethmoid recess was normal.  She has a septal deviation to the left.    Provider Impressions:  1. Chronic sinusitis "   2. Posterior nasal drainage   3. Nasal airway obstruction, deviated nasal septum, inferior turbinate hypertrophy  4. Facial pressure / pain   5. Throat clearing, coughing   6. Asthma   7. Heartburn on proton pump inhibitor   8. Negative allergy testing 2023    Discussion:  Naomi Dunaway and I discussed her current exam and symptoms.  She has done generally well but did have some symptoms as outlined above.  If her symptoms become more bothersome I recommended more consistent utilization of the mometasone and she was comfortable with this plan moving forward.  We discussed her previous medical trials including Augmentin, Mucinex, flunisolide, Sudafed, doxycycline, Flonase, azelastine, and oral steroid therapy.  Based on these trials I do think that mometasone is her best option currently.    I recommended follow-up with me in 12 months sooner if her symptoms progress.  I asked her to contact my office for refills of the mometasone.  All questions were answered.    Signature:  Calvin Torres MD

## 2024-07-24 NOTE — PROGRESS NOTES
"Subjective   Reason for Visit: Naomi Dunaway is an 69 y.o. female here for a Medicare Wellness visit.     Past Medical, Surgical, and Family History reviewed and updated in chart.    Reviewed all medications by prescribing practitioner or clinical pharmacist (such as prescriptions, OTCs, herbal therapies and supplements) and documented in the medical record.    69 y.o. 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   HENT:  Negative for ear pain, hearing loss, tinnitus, trouble swallowing and voice change.    Eyes:  Negative for visual disturbance.   Respiratory:  Negative for cough, chest tightness and wheezing.    Cardiovascular:  Negative for chest pain, palpitations and leg swelling.   Gastrointestinal:  Negative for blood in stool, constipation, diarrhea, nausea and vomiting.   Genitourinary:  Negative for difficulty urinating, hematuria and vaginal bleeding.   Musculoskeletal:  Negative for arthralgias.   Skin:  Negative for rash.   Neurological:  Negative for dizziness, tremors, syncope, light-headedness and headaches.       Objective   Vitals:  /69   Pulse 74   Temp 36.3 °C (97.3 °F)   Ht 1.549 m (5' 1\")   Wt 47.1 kg (103 lb 12.8 oz)   SpO2 98%   BMI 19.61 kg/m²       Physical Exam  Constitutional:       General: She is not in acute " distress.     Appearance: She is well-developed. She is not diaphoretic.   HENT:      Head: Normocephalic.      Right Ear: Tympanic membrane normal. There is no impacted cerumen.      Left Ear: Tympanic membrane normal. There is no impacted cerumen.      Nose: Nose normal.      Mouth/Throat:      Mouth: Mucous membranes are moist.      Pharynx: Oropharynx is clear. No oropharyngeal exudate or posterior oropharyngeal erythema.   Eyes:      General: No scleral icterus.     Extraocular Movements: Extraocular movements intact.      Conjunctiva/sclera: Conjunctivae normal.      Pupils: Pupils are equal, round, and reactive to light.   Neck:      Thyroid: No thyromegaly.      Vascular: No JVD.   Cardiovascular:      Rate and Rhythm: Normal rate and regular rhythm.      Pulses: Normal pulses.      Heart sounds: Normal heart sounds. No murmur heard.     No friction rub. No gallop.   Pulmonary:      Effort: Pulmonary effort is normal. No respiratory distress.      Breath sounds: Normal breath sounds. No wheezing or rales.   Chest:      Chest wall: No tenderness.   Abdominal:      General: Bowel sounds are normal. There is no distension.      Palpations: Abdomen is soft. There is no mass.      Tenderness: There is no abdominal tenderness. There is no rebound.   Musculoskeletal:         General: Normal range of motion.      Cervical back: Normal range of motion and neck supple.   Lymphadenopathy:      Cervical: No cervical adenopathy.   Skin:     General: Skin is warm and dry.   Neurological:      General: No focal deficit present.      Mental Status: She is alert and oriented to person, place, and time.      Deep Tendon Reflexes: Reflexes normal.   Psychiatric:         Mood and Affect: Mood normal.         Thought Content: Thought content normal.         Assessment/Plan   Problem List Items Addressed This Visit             ICD-10-CM    Sensorineural hearing loss (SNHL) H90.5     Has hearing aids         Hyperlipidemia E78.5      Labs ordered         GERD (gastroesophageal reflux disease) K21.9     Other Visit Diagnoses         Codes    Routine general medical examination at health care facility    -  Primary Z00.00    Healthcare maintenance     Z00.00    Relevant Orders    ECG 12 lead (Clinic Performed)    Encounter for screening mammogram for breast cancer     Z12.31    Relevant Orders    BI mammo bilateral screening tomosynthesis        Recommend increase calorie intake    ACP discussion complete - full code       Alcohol Misuse Screen  5 - 10 minutes were spent screening the patient for alcohol misuse disorder.     Follow up with m in 6 months

## 2024-08-05 ENCOUNTER — HOSPITAL ENCOUNTER (OUTPATIENT)
Dept: RADIOLOGY | Facility: CLINIC | Age: 69
Discharge: HOME | End: 2024-08-05
Payer: MEDICARE

## 2024-08-05 VITALS — WEIGHT: 101 LBS | HEIGHT: 61 IN | BODY MASS INDEX: 19.07 KG/M2

## 2024-08-05 DIAGNOSIS — Z12.31 ENCOUNTER FOR SCREENING MAMMOGRAM FOR BREAST CANCER: ICD-10-CM

## 2024-08-05 PROCEDURE — 77067 SCR MAMMO BI INCL CAD: CPT | Performed by: STUDENT IN AN ORGANIZED HEALTH CARE EDUCATION/TRAINING PROGRAM

## 2024-08-05 PROCEDURE — 77063 BREAST TOMOSYNTHESIS BI: CPT | Performed by: STUDENT IN AN ORGANIZED HEALTH CARE EDUCATION/TRAINING PROGRAM

## 2024-08-05 PROCEDURE — 77067 SCR MAMMO BI INCL CAD: CPT

## 2024-08-14 ENCOUNTER — TELEPHONE (OUTPATIENT)
Dept: PRIMARY CARE | Facility: CLINIC | Age: 69
End: 2024-08-14
Payer: COMMERCIAL

## 2024-08-14 NOTE — TELEPHONE ENCOUNTER
Patient asking for a call back regarding her last office visit note. She noticed at the end of the visit that a discussion for misuse of alcohol screening lasting  5-10 minutes was shown on her note. Would like some clarification as she does not drink alcohol and only remembered being asked how many if any drinks she has.

## 2024-08-20 ENCOUNTER — APPOINTMENT (OUTPATIENT)
Dept: PRIMARY CARE | Facility: CLINIC | Age: 69
End: 2024-08-20
Payer: COMMERCIAL

## 2024-08-22 ENCOUNTER — APPOINTMENT (OUTPATIENT)
Dept: PRIMARY CARE | Facility: CLINIC | Age: 69
End: 2024-08-22
Payer: MEDICARE

## 2024-08-22 DIAGNOSIS — E53.8 VITAMIN B12 DEFICIENCY: ICD-10-CM

## 2024-08-22 PROCEDURE — 96372 THER/PROPH/DIAG INJ SC/IM: CPT | Performed by: INTERNAL MEDICINE

## 2024-08-26 ASSESSMENT — ENCOUNTER SYMPTOMS
AGITATION: 0
FEVER: 0
DIFFICULTY URINATING: 0
CONFUSION: 0
BACK PAIN: 1
NAUSEA: 0
FACIAL ASYMMETRY: 0
MYALGIAS: 1
SHORTNESS OF BREATH: 0
CHILLS: 0
ABDOMINAL PAIN: 0
ARTHRALGIAS: 1
NECK STIFFNESS: 1
NECK PAIN: 1

## 2024-08-26 NOTE — PROGRESS NOTES
Subjective   Patient ID: Naomi Dunaway is a 69 y.o. female who presents today for the treatment of pain involving their neck, upper back and L sacral pains.    This is visit 11 of the 2024 calendar year. Medicare.     Fall risk: None. No falls in the last 6 months. (She does have Hx falling in the past)    HPI -she has had a lot going on since her last treatment encounter.  Her  unfortunately was dehydrated and had a stroke.  He is doing better and has recovered fully from this.  However he continues to undergo treatments for his cancer which always provides a level of stress for her.  Luckily, she has been meeting with a therapist regularly to help manage some of these emotions.  Today she would like to focus treatment on her chronically tight neck and lower back regions.  The neck is mostly very stiff and sore in the left side of the sacrum and hips are more tender than the right.  This may be related to her previous knee injury.    Last treatment 6/28/24: She was out of town for a HS reunion and was able spend some time with family. However with the travel her neck and lower back have been more symptomatic. No new injuries.     5/30/24: Overall not bad. Happy to have this apt scheduled to keep her more flexible. Same L>R neck and shoulder and L hip/sacrum soreness, aching pain and stiffness. No new injuries or acute exacerbations.     5/16/24: She is still getting organized in her new home, but overall she is doing well. Trying to pace her self. No yard work this year with the [employer] which is good. Still most pain and tension into the neck and upper back as well as the L hip.      5/1/24: she is finally basically moved into her new home and starting to settle in.  She continues to have an aching pain over the left sacrum and into the glutes and left upper leg region.  The pain is not acute but she does notice it daily.  She also continues to have pain across the upper back neck and shoulders.  No new  injuries.  No changes to her medical history.    4/1/24: no significant changes to report today.  Specifically regarding her pain.  However she has had a lot of stress with her dog being sick, her  having a flareup in his pain and also in the midst of closing on their home and moving to a new condo.  She is planning to do a lot of of packing and moving next week and is anticipating that she will likely be more sore as a result.    3/15/24: physically she is holding up okay, however she has had a lot of increased stress the last 2 weeks.  She and her  bought a new condo and are in the process of now selling their home.  She also is dealing with a cancer diagnosis of her dog and found out today she needs to take him to the emergency vet this afternoon.    We will continue to treat her historically tight and painful areas including the neck, upper back/traps and lower back/hips.  She is slowly getting back to work.     3/6/24: she reports that she had a really great trip to [place] with her  where they stayed for a month.  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  Overall she is doing pretty good.  She did do a trial class of Pilates and really enjoyed it.  She is thinking of joining club [employer] in [place] where she can do this form of exercise a little more regularly.    She like to treat her chronically tight neck upper back and shoulders as well as the left sacrum and hip.  Although the knee still has limitations and stiffness it is not actively/acutely bothering her today.    1/23/24: she is leaving for her road trip to [place] on Thursday/Friday depending on the weather with her  and their dog.  She would like to focus on the neck and shoulders but is also having some slight increase in symptoms over the left piriformis/gluteal/sacral region.  This tends to get worse when sitting or in a long car ride and she was  nervous before she left and made this appointment.  No numbness and tingling into the leg and no debilitating pain.  She still has full capacity to walk and ambulate safely without any acute pain issues.    1/15/24: She is doing okay. She cancelled the trip to [place] but is looking forward to a trip to [place] for about 4-5 weeks. Still a lotof tension/tightness into the neck upper back/shoulder and L>R sacrum/glute, but no acute flare-ups.     1/2/24: She has been hosting a lot for the holidays and has been feeing some more tension and tightness/pain in to the L>R upper back and neck. She is also leaving for a trip to [place], [place] on Saturday. Luckily, the L sacrum has not bee too bad. She is hoping that a treatment will help keep her comfortable during her travels.   __________  2022:    She got the MRI of her head which was negative. It just showed some typical degenerative changes for her age.     R knee (arthroscopic meniscus) surgery for 12/7/21 with Dr. Larkin.  __________   2021:   Additional info: She has recently seen and ortho and had MRI of her R knee. Confirmed meniscus injury. Saw Dr. Lujan on 8/5 who also suggested surgery. She is being refereed to Dr. Rincon for a surgical consultation. However, at this time Naomi is not comfortable with surgery and feels as if she can prolong the need for this for a while. She may cancel her consultation with Dr. Rincon until she is ready to commit to surgical procedure.    Review of Systems   Constitutional:  Negative for chills and fever.   HENT:  Negative for congestion and sneezing.    Eyes:  Negative for visual disturbance.   Respiratory:  Negative for shortness of breath.         +Asthma (managed with inhalers)   Cardiovascular:  Negative for chest pain.   Gastrointestinal:  Negative for abdominal pain and nausea.   Endocrine: Negative for polyuria.   Genitourinary:  Negative for difficulty urinating.   Musculoskeletal:  Positive for arthralgias, back pain, myalgias, neck  pain and neck stiffness.        + Takes Cymbalta and Rx Flexeril for pain, +OA of knee (past surgery)   Neurological:  Negative for facial asymmetry.   Psychiatric/Behavioral:  Negative for agitation and confusion.         +Anxiety/Depression     Objective   Observation : normal gait, forward head posture, and hyperkyphosis    Physical Exam  Examination findings (palpation & ROM): Tenderness and hypertonicity over the upper trapezius, cervical paraspinals, levator scapula, lumbar paraspinals, L piriformis, L gluteals, and iliolumbar ligament on the left side.     Segmental joint dysfunction was identified in the following areas using motion palpation and/or pain provocation assessment:  Cervical: C0-C6 (manual traction and joint mobilizations)  Thoracic: T1-T5   Lumbopelvic: L SIJ and L4-L5 (Side-lying traction and end range stretching/mobilizations, gentle drop table).     Today's treatment:  Performed spinal manipulation to the regions of segmental dysfunction identified on examination using age-appropriate and injury specific force, and manual diversified technique.     Brief supine IC/MFR to B UT, lev scap and cervical paraspinals.  Brief prone ischemic compression and stretching to the left greater than right SI joint and lower back.           Treatment Plan:   The patient and I discussed the risks and benefits of chiropractic care. Based on the patient's subjective complaints along with the examination findings, it is advised that a course of chiropractic treatment by initiated. Consent for care was given both written and orally by the patient. The patient tolerated today's treatment with little or no additional discomfort and was instructed to contact the office for questions or concerns.     Treatment Frequency: Will see/treat patient every 2-4 weeks as therapeutic benefit is sustained, then frequency will be reduced to as needed for symptom management or as needed for acute flare-ups/exacerbation.      Please note: Voice-to-text software was used when completing this note.  While the note was proofread, portions may include grammatical errors.  Please contact me with any questions/concerns as it relates to these types of errors.

## 2024-08-27 ENCOUNTER — APPOINTMENT (OUTPATIENT)
Dept: INTEGRATIVE MEDICINE | Facility: CLINIC | Age: 69
End: 2024-08-27
Payer: COMMERCIAL

## 2024-08-27 DIAGNOSIS — M99.04 SEGMENTAL AND SOMATIC DYSFUNCTION OF SACRAL REGION: ICD-10-CM

## 2024-08-27 DIAGNOSIS — M99.03 SEGMENTAL AND SOMATIC DYSFUNCTION OF LUMBAR REGION: Primary | ICD-10-CM

## 2024-08-27 DIAGNOSIS — M79.10 MYALGIA: ICD-10-CM

## 2024-08-27 DIAGNOSIS — M99.02 SEGMENTAL AND SOMATIC DYSFUNCTION OF THORACIC REGION: ICD-10-CM

## 2024-08-27 DIAGNOSIS — G89.29 CHRONIC BILATERAL THORACIC BACK PAIN: ICD-10-CM

## 2024-08-27 DIAGNOSIS — M54.2 NECK PAIN: ICD-10-CM

## 2024-08-27 DIAGNOSIS — M50.30 DDD (DEGENERATIVE DISC DISEASE), CERVICAL: ICD-10-CM

## 2024-08-27 DIAGNOSIS — M99.01 SEGMENTAL AND SOMATIC DYSFUNCTION OF CERVICAL REGION: ICD-10-CM

## 2024-08-27 DIAGNOSIS — M99.05 SEGMENTAL AND SOMATIC DYSFUNCTION OF PELVIC REGION: ICD-10-CM

## 2024-08-27 DIAGNOSIS — M53.3 SACRAL BACK PAIN: ICD-10-CM

## 2024-08-27 DIAGNOSIS — M54.6 CHRONIC BILATERAL THORACIC BACK PAIN: ICD-10-CM

## 2024-08-27 DIAGNOSIS — M99.00 SEGMENTAL AND SOMATIC DYSFUNCTION OF HEAD REGION: ICD-10-CM

## 2024-08-27 DIAGNOSIS — M79.9 POSTURAL STRAIN: ICD-10-CM

## 2024-08-27 PROCEDURE — 98942 CHIROPRACTIC MANJ 5 REGIONS: CPT | Performed by: CHIROPRACTOR

## 2024-09-03 ENCOUNTER — APPOINTMENT (OUTPATIENT)
Dept: BEHAVIORAL HEALTH | Facility: CLINIC | Age: 69
End: 2024-09-03
Payer: MEDICARE

## 2024-09-03 DIAGNOSIS — F33.42 RECURRENT MAJOR DEPRESSIVE DISORDER, IN FULL REMISSION (CMS-HCC): ICD-10-CM

## 2024-09-03 DIAGNOSIS — F41.9 ANXIETY: ICD-10-CM

## 2024-09-03 PROCEDURE — 1123F ACP DISCUSS/DSCN MKR DOCD: CPT | Performed by: PSYCHIATRY & NEUROLOGY

## 2024-09-03 PROCEDURE — 1160F RVW MEDS BY RX/DR IN RCRD: CPT | Performed by: PSYCHIATRY & NEUROLOGY

## 2024-09-03 PROCEDURE — 1159F MED LIST DOCD IN RCRD: CPT | Performed by: PSYCHIATRY & NEUROLOGY

## 2024-09-03 PROCEDURE — 1157F ADVNC CARE PLAN IN RCRD: CPT | Performed by: PSYCHIATRY & NEUROLOGY

## 2024-09-03 PROCEDURE — 99214 OFFICE O/P EST MOD 30 MIN: CPT | Performed by: PSYCHIATRY & NEUROLOGY

## 2024-09-03 RX ORDER — FLUOXETINE 10 MG/1
30 CAPSULE ORAL DAILY
Qty: 270 CAPSULE | Refills: 0 | Status: SHIPPED | OUTPATIENT
Start: 2024-09-03 | End: 2024-12-02

## 2024-09-03 RX ORDER — DULOXETIN HYDROCHLORIDE 60 MG/1
60 CAPSULE, DELAYED RELEASE ORAL DAILY
Qty: 90 CAPSULE | Refills: 0 | Status: SHIPPED | OUTPATIENT
Start: 2024-09-03 | End: 2024-12-02

## 2024-09-03 ASSESSMENT — ENCOUNTER SYMPTOMS
NERVOUS/ANXIOUS: 1
DYSPHORIC MOOD: 0

## 2024-09-03 ASSESSMENT — PATIENT HEALTH QUESTIONNAIRE - PHQ9
10. IF YOU CHECKED OFF ANY PROBLEMS, HOW DIFFICULT HAVE THESE PROBLEMS MADE IT FOR YOU TO DO YOUR WORK, TAKE CARE OF THINGS AT HOME, OR GET ALONG WITH OTHER PEOPLE: SOMEWHAT DIFFICULT
SUM OF ALL RESPONSES TO PHQ9 QUESTIONS 1 AND 2: 2
2. FEELING DOWN, DEPRESSED OR HOPELESS: SEVERAL DAYS
1. LITTLE INTEREST OR PLEASURE IN DOING THINGS: SEVERAL DAYS

## 2024-09-03 NOTE — PROGRESS NOTES
"Adult Ambulatory Psychiatry Progress Note      Assessment/Plan     Impression:  Naomi Dunaway is a 69 y.o. female domiciled with , retired who presents for follow up with [facility] of Depression and Anxiety.    Plan:        Depression/anxiety - increase to duloxetine 60mg daily, taper fluoxetine 20mg daily, lamotrigine 150mg BID, lorazepam 0.5mg TID prn panic, c/w therapy, c/w med ed, psycho ed and supportive psychotherapy, f/u 2 months         Subjective   HPI:  Pt arrived on time. Mood \"blah\" since last session. She has occasional low mood. Anxiety is \"ok\". She felt worse a few weeks ago after her  had a stroke. She didn't increase the duloxetine until a few days ago. Discussed continuing transition off fluoxetine to allow an increase in duloxetine. Will have pt taper fluoxetine to 30mg before increasing duloxetine to 60mg. Then pt can reduce fluoxetine to 20mg and continue on that regimen until next session. Ultimate goal will be to discontinue fluoxetine.          Review of Systems   Psychiatric/Behavioral:  Negative for dysphoric mood and suicidal ideas. The patient is nervous/anxious.      OARRS:  Vinicius Acharya MD on 9/4/2024  9:51 AM  I have personally reviewed the OARRS report for Naomi Dunaway. I have considered the risks of abuse, dependence, addiction and diversion    Is the patient prescribed a combination of a benzodiazepine and opioid?  No        Objective   Mental Status Exam:  General Appearance: Well groomed, appropriate eye contact  Attitude/Behavior: Cooperative  Motor: No psychomotor agitation or retardation, no tremor or other abnormal movements  Speech: Normal rate, volume, prosody  Mood: \"blah\"  Affect: Constricted  Thought Process: Linear, goal directed  Thought Associations: No loosening of associations  Thought Content: Normal  Perception: No perceptual abnormalities noted  Insight: Intact  Judgement: Intact    Vitals:  There were no vitals filed for this visit.    Current " Medications:  Current Outpatient Medications on File Prior to Visit   Medication Sig Dispense Refill    Advair Diskus 100-50 mcg/dose diskus inhaler INHALE 1 PUFF BY MOUTH TWICE A DAY (MORNING AND EVENING ABOUT 12 HOURS APART) 60 each 2    albuterol 90 mcg/actuation inhaler Inhale 1-2 puffs every 6 hours if needed.      amphetamine-dextroamphetamine (Adderall) 10 mg tablet Take by mouth.      BIOTIN ORAL Take by mouth. daily      CALCIUM ORAL Take 2,000 mg by mouth 1 (one) time each day. Calcium 250 mg Caps; 2000 mg daily      cholecalciferol (Vitamin D-3) 25 MCG (1000 UT) capsule Take 1 capsule (25 mcg) by mouth.      cyclobenzaprine (Flexeril) 10 mg tablet prn      folic acid 0.8 mg capsule Unsure of the strength      guaiFENesin (Mucinex) 1,200 mg tablet extended release 12hr Take 1 tablet (1,200 mg) by mouth every 12 hours.      lamoTRIgine (LaMICtal) 150 mg tablet Take 1 tablet (150 mg) by mouth 2 times a day. 180 tablet 0    levomefolate-algal oil 15-90.314 mg capsule Take 1 capsule by mouth 1 (one) time each day.      LORazepam (Ativan) 0.5 mg tablet Take 1 tablet (0.5 mg) by mouth 3 times a day as needed for anxiety for up to 10 days. 30 tablet 0    mometasone furoate, bulk, 100 % powder 2 mg by sinus irrigation route once daily. 60 g 11    pantoprazole (ProtoNix) 40 mg EC tablet Take 1 tablet (40 mg) by mouth once daily. 90 tablet 1    scopolamine (Transderm-Scop) 1 mg over 3 days patch 3 day Place on the skin.      [DISCONTINUED] DULoxetine (Cymbalta) 20 mg DR capsule Take 1 capsule (20 mg) by mouth 2 times a day. 180 capsule 0    [DISCONTINUED] FLUoxetine (PROzac) 40 mg capsule Take 1 capsule (40 mg) by mouth once daily. 90 capsule 1     Current Facility-Administered Medications on File Prior to Visit   Medication Dose Route Frequency Provider Last Rate Last Admin    cyanocobalamin (Vitamin B-12) injection 1,000 mcg  1,000 mcg intramuscular q7 days Sherly Soares MD   1,000 mcg at 10/03/23 1200     cyanocobalamin (Vitamin B-12) injection 1,000 mcg  1,000 mcg intramuscular q14 days Sherly Soares MD   1,000 mcg at 10/31/23 1353    cyanocobalamin (Vitamin B-12) injection 1,000 mcg  1,000 mcg intramuscular q14 days Sherly Soares MD   1,000 mcg at 08/22/24 1007       Orders:  Diagnoses and all orders for this visit:  Recurrent major depressive disorder, in full remission (CMS-HCC)  -     DULoxetine (Cymbalta) 60 mg DR capsule; Take 1 capsule (60 mg) by mouth once daily.  -     FLUoxetine (PROzac) 10 mg capsule; Take 3 capsules (30 mg) by mouth once daily.  -     Follow Up In Psychiatry; Future  Anxiety  -     DULoxetine (Cymbalta) 60 mg DR capsule; Take 1 capsule (60 mg) by mouth once daily.  -     FLUoxetine (PROzac) 10 mg capsule; Take 3 capsules (30 mg) by mouth once daily.        PHQ9  Over the past 2 weeks, how often have you been bothered by any of the following problems?  Little interest or pleasure in doing things: Several days  Feeling down, depressed, or hopeless: Several days     Next Appointment:  Follow up in 8 weeks (on 10/31/2024).

## 2024-09-04 ENCOUNTER — APPOINTMENT (OUTPATIENT)
Dept: INTEGRATIVE MEDICINE | Facility: CLINIC | Age: 69
End: 2024-09-04
Payer: COMMERCIAL

## 2024-09-04 DIAGNOSIS — M79.10 MYALGIA: ICD-10-CM

## 2024-09-04 DIAGNOSIS — M54.6 CHRONIC BILATERAL THORACIC BACK PAIN: ICD-10-CM

## 2024-09-04 DIAGNOSIS — G89.29 CHRONIC BILATERAL THORACIC BACK PAIN: ICD-10-CM

## 2024-09-04 DIAGNOSIS — M54.42 ACUTE LEFT-SIDED LOW BACK PAIN WITH LEFT-SIDED SCIATICA: ICD-10-CM

## 2024-09-04 DIAGNOSIS — M99.03 SEGMENTAL AND SOMATIC DYSFUNCTION OF LUMBAR REGION: Primary | ICD-10-CM

## 2024-09-04 DIAGNOSIS — M99.05 SEGMENTAL AND SOMATIC DYSFUNCTION OF PELVIC REGION: ICD-10-CM

## 2024-09-04 DIAGNOSIS — M53.3 SACRAL BACK PAIN: ICD-10-CM

## 2024-09-04 DIAGNOSIS — M99.00 SEGMENTAL AND SOMATIC DYSFUNCTION OF HEAD REGION: ICD-10-CM

## 2024-09-04 DIAGNOSIS — M50.30 DDD (DEGENERATIVE DISC DISEASE), CERVICAL: ICD-10-CM

## 2024-09-04 DIAGNOSIS — M99.02 SEGMENTAL AND SOMATIC DYSFUNCTION OF THORACIC REGION: ICD-10-CM

## 2024-09-04 DIAGNOSIS — M99.01 SEGMENTAL AND SOMATIC DYSFUNCTION OF CERVICAL REGION: ICD-10-CM

## 2024-09-04 DIAGNOSIS — M79.9 POSTURAL STRAIN: ICD-10-CM

## 2024-09-04 DIAGNOSIS — M99.04 SEGMENTAL AND SOMATIC DYSFUNCTION OF SACRAL REGION: ICD-10-CM

## 2024-09-04 DIAGNOSIS — M54.2 NECK PAIN: ICD-10-CM

## 2024-09-04 PROCEDURE — 98942 CHIROPRACTIC MANJ 5 REGIONS: CPT | Performed by: CHIROPRACTOR

## 2024-09-04 ASSESSMENT — ENCOUNTER SYMPTOMS
NECK PAIN: 1
ABDOMINAL PAIN: 0
NECK STIFFNESS: 1
NAUSEA: 0
CHILLS: 0
MYALGIAS: 1
DIFFICULTY URINATING: 0
FACIAL ASYMMETRY: 0
FEVER: 0
SHORTNESS OF BREATH: 0
BACK PAIN: 1
ARTHRALGIAS: 1
AGITATION: 0
CONFUSION: 0

## 2024-09-04 NOTE — PROGRESS NOTES
Subjective   Patient ID: Naomi Dunaway is a 69 y.o. female who presents today for the treatment of pain involving their neck, upper back and L sacral pains.    This is visit 12 of the 2024 calendar year. Medicare.     Fall risk: None. No falls in the last 6 months. (She does have Hx falling in the past)    HPI - She is still L>R neck and L sacral pain. Treatment helps, she is much more mobile since starting Chiropractic treatments.     Last treatment 8/27/24: she has had a lot going on since her last treatment encounter.  Her  unfortunately was dehydrated and had a stroke.  He is doing better and has recovered fully from this.  However he continues to undergo treatments for his cancer which always provides a level of stress for her.  Luckily, she has been meeting with a therapist regularly to help manage some of these emotions.  Today she would like to focus treatment on her chronically tight neck and lower back regions.  The neck is mostly very stiff and sore in the left side of the sacrum and hips are more tender than the right.  This may be related to her previous knee injury.    6/28/24: She was out of town for a HS reunion and was able spend some time with family. However with the travel her neck and lower back have been more symptomatic. No new injuries.     5/30/24: Overall not bad. Happy to have this apt scheduled to keep her more flexible. Same L>R neck and shoulder and L hip/sacrum soreness, aching pain and stiffness. No new injuries or acute exacerbations.     5/16/24: She is still getting organized in her new home, but overall she is doing well. Trying to pace her self. No yard work this year with the [employer], which is good. Still most pain and tension into the neck and upper back as well as the L hip.      5/1/24: she is finally basically moved into her new home and starting to settle in.  She continues to have an aching pain over the left sacrum and into the glutes and left upper leg region.  The  pain is not acute but she does notice it daily.  She also continues to have pain across the upper back neck and shoulders.  No new injuries.  No changes to her medical history.    4/1/24: no significant changes to report today.  Specifically regarding her pain.  However she has had a lot of stress with her dog being sick, her  having a flareup in his pain and also in the midst of closing on their home and moving to a new condo.  She is planning to do a lot of of packing and moving next week and is anticipating that she will likely be more sore as a result.    3/15/24: physically she is holding up okay, however she has had a lot of increased stress the last 2 weeks.  She and her  bought a new condo and are in the process of now selling their home.  She also is dealing with a cancer diagnosis of her dog and found out today she needs to take him to the emergency vet this afternoon.    We will continue to treat her historically tight and painful areas including the neck, upper back/traps and lower back/hips.  She is slowly getting back to work.     3/6/24: she reports that she had a really great trip to [place] with her  where they stayed for a month.  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  Overall she is doing pretty good.  She did do a trial class of Pilates and really enjoyed it.  She is thinking of joining club Pilates in [place] where she can do this form of exercise a little more regularly.    She like to treat her chronically tight neck upper back and shoulders as well as the left sacrum and hip.  Although the knee still has limitations and stiffness it is not actively/acutely bothering her today.    1/23/24: she is leaving for her road trip to [place] on Thursday/Friday depending on the weather with her  and their dog.  She would like to focus on the neck and shoulders but is also having some slight increase  in symptoms over the left piriformis/gluteal/sacral region.  This tends to get worse when sitting or in a long car ride and she was nervous before she left and made this appointment.  No numbness and tingling into the leg and no debilitating pain.  She still has full capacity to walk and ambulate safely without any acute pain issues.    1/15/24: She is doing okay. She cancelled the trip to [place] but is looking forward to a trip to [place] for about 4-5 weeks. Still a lotof tension/tightness into the neck upper back/shoulder and L>R sacrum/glute, but no acute flare-ups.     1/2/24: She has been hosting a lot for the holidays and has been feeing some more tension and tightness/pain in to the L>R upper back and neck. She is also leaving for a trip to [place], [place] on Saturday. Luckily, the L sacrum has not bee too bad. She is hoping that a treatment will help keep her comfortable during her travels.   __________  2022:    She got the MRI of her head which was negative. It just showed some typical degenerative changes for her age.     R knee (arthroscopic meniscus) surgery for 12/7/21 with Dr. Larkin.  __________   2021:   Additional info: She has recently seen and ortho and had MRI of her R knee. Confirmed meniscus injury. Saw Dr. Lujan on 8/5 who also suggested surgery. She is being refereed to Dr. Rincon for a surgical consultation. However, at this time Naomi is not comfortable with surgery and feels as if she can prolong the need for this for a while. She may cancel her consultation with Dr. Rincon until she is ready to commit to surgical procedure.    Review of Systems   Constitutional:  Negative for chills and fever.   HENT:  Negative for congestion and sneezing.    Eyes:  Negative for visual disturbance.   Respiratory:  Negative for shortness of breath.         +Asthma (managed with inhalers)   Cardiovascular:  Negative for chest pain.   Gastrointestinal:  Negative for abdominal pain and nausea.   Endocrine: Negative for  polyuria.   Genitourinary:  Negative for difficulty urinating.   Musculoskeletal:  Positive for arthralgias, back pain, myalgias, neck pain and neck stiffness.        + Takes Cymbalta and Rx Flexeril for pain, +OA of knee (past surgery)   Neurological:  Negative for facial asymmetry.   Psychiatric/Behavioral:  Negative for agitation and confusion.         +Anxiety/Depression     Objective   Observation : normal gait, forward head posture, and hyperkyphosis    Physical Exam  Examination findings (palpation & ROM): Tenderness and hypertonicity over the upper trapezius, cervical paraspinals, levator scapula, lumbar paraspinals, L piriformis, L gluteals, and iliolumbar ligament on the left side.     Segmental joint dysfunction was identified in the following areas using motion palpation and/or pain provocation assessment:  Cervical: C0-C6 (manual traction and joint mobilizations)  Thoracic: T1-T5   Lumbopelvic: L SIJ and L4-L5 (Side-lying traction and end range stretching/mobilizations, gentle drop table).     Today's treatment:  Performed spinal manipulation to the regions of segmental dysfunction identified on examination using age-appropriate and injury specific force, and manual diversified technique.     Brief supine IC/MFR to B UT, lev scap and cervical paraspinals.  Brief prone ischemic compression and stretching to the left greater than right SI joint and lower back.           Treatment Plan:   The patient and I discussed the risks and benefits of chiropractic care. Based on the patient's subjective complaints along with the examination findings, it is advised that a course of chiropractic treatment by initiated. Consent for care was given both written and orally by the patient. The patient tolerated today's treatment with little or no additional discomfort and was instructed to contact the office for questions or concerns.     Treatment Frequency: Will see/treat patient every 2-4 weeks as therapeutic benefit is  sustained, then frequency will be reduced to as needed for symptom management or as needed for acute flare-ups/exacerbation.     Please note: Voice-to-text software was used when completing this note.  While the note was proofread, portions may include grammatical errors.  Please contact me with any questions/concerns as it relates to these types of errors.

## 2024-09-18 ENCOUNTER — APPOINTMENT (OUTPATIENT)
Dept: INTEGRATIVE MEDICINE | Facility: CLINIC | Age: 69
End: 2024-09-18
Payer: COMMERCIAL

## 2024-09-19 ENCOUNTER — APPOINTMENT (OUTPATIENT)
Dept: INTEGRATIVE MEDICINE | Facility: CLINIC | Age: 69
End: 2024-09-19
Payer: MEDICARE

## 2024-09-19 DIAGNOSIS — M50.30 DDD (DEGENERATIVE DISC DISEASE), CERVICAL: ICD-10-CM

## 2024-09-19 DIAGNOSIS — M99.05 SEGMENTAL AND SOMATIC DYSFUNCTION OF PELVIC REGION: ICD-10-CM

## 2024-09-19 DIAGNOSIS — M99.01 SEGMENTAL AND SOMATIC DYSFUNCTION OF CERVICAL REGION: ICD-10-CM

## 2024-09-19 DIAGNOSIS — M53.3 SACRAL BACK PAIN: ICD-10-CM

## 2024-09-19 DIAGNOSIS — M99.03 SEGMENTAL AND SOMATIC DYSFUNCTION OF LUMBAR REGION: Primary | ICD-10-CM

## 2024-09-19 PROCEDURE — 98941 CHIROPRACT MANJ 3-4 REGIONS: CPT | Performed by: CHIROPRACTOR

## 2024-09-19 NOTE — PROGRESS NOTES
Subjective   Patient ID: Naomi Dunaway is a 69 y.o. female who presents September 19, 2024 for chiropractic care.    13 VPCY      HPI : Naomi presents to my office for chiropractic care as the patient of my former colleague, Dr. Harvey. Naomi arrives today for care of low back and neck discomfort. She has found chiropractic treatments very helpful in improving functionality and reducing pain. She notes L>R low back discomfort and neck tension. Recently moved homes and notes she had no exacerbation in symptoms. Denies new trauma/incident.      Objective   Physical Exam  Neurological:      General: No focal deficit present.      Mental Status: She is alert and oriented to person, place, and time.      Cranial Nerves: No dysarthria or facial asymmetry.      Sensory: Sensation is intact.      Motor: Motor function is intact.      Coordination: Coordination is intact.      Gait: Gait is intact. Gait normal.         Palpation of the following region(s) revealed:  Cervical: Upper trapezius bilateral, muscular hypertonicity.  Levator scapulae bilateral, muscular hypertonicity.  Cervical paraspinals bilateral, muscular hypertonicity.  Thoracic: Pectoralis bilateral, muscular hypertonicity.  Lumbar: Lumbar paraspinals bilateral, hypertonicity and tenderness.  Quadratus lumborum left, hypertonicity and tenderness.  Gluteal bilateral, muscular hypertonicity.        Segmental Joint(s): Segmental joint dysfunction was assessed with motion palpation and is identified in the following areas:  Cervical : C0 C1 C5  Thoracic : T3 and T4  Lumbopelvic / Sacral SIJ : L3, L4, L5/S1, R SIJ, and L SIJ      Assessment/Plan   Today's Treatment Included: Chiropractic manipulation to the Segmental Joint(s) Cervical : C0 C1 C5 Segmental Joint(s) Lumbopelvic/Sacral SIJ : L3, L4, L5/S1, R SIJ, and L SIJ Segmental Joint(s) Thoracic : T3 and T4   Treatment Techniques Used : Pelvic drop table technique, Flexion-distraction technique, Manual traction,  and Low force    Soft-tissue mobilization was performed in the following areas:   Cervical paraspinal mm. bilateral, Upper Trapezius bilateral, and Levator Scap. bilateral  Middle Trapezius bilateral and Rhomboids bilateral  Lumbar Paraspinal mm. bilateral, Quadratus Lumborum bilateral, Gluteal mm. Glute. Med. bilateral, and Piriformis bilateral            FU in 1 month    The patient tolerated today's treatment with little or no additional discomfort and was instructed to contact the office for questions or concerns.

## 2024-09-24 ENCOUNTER — APPOINTMENT (OUTPATIENT)
Dept: PRIMARY CARE | Facility: CLINIC | Age: 69
End: 2024-09-24
Payer: MEDICARE

## 2024-09-24 DIAGNOSIS — E53.8 VITAMIN B12 DEFICIENCY: ICD-10-CM

## 2024-09-24 PROCEDURE — 96372 THER/PROPH/DIAG INJ SC/IM: CPT | Performed by: INTERNAL MEDICINE

## 2024-10-04 ENCOUNTER — APPOINTMENT (OUTPATIENT)
Dept: ALLERGY | Facility: CLINIC | Age: 69
End: 2024-10-04
Payer: MEDICARE

## 2024-10-22 ENCOUNTER — CLINICAL SUPPORT (OUTPATIENT)
Dept: PRIMARY CARE | Facility: CLINIC | Age: 69
End: 2024-10-22
Payer: MEDICARE

## 2024-10-22 ENCOUNTER — APPOINTMENT (OUTPATIENT)
Dept: PRIMARY CARE | Facility: CLINIC | Age: 69
End: 2024-10-22
Payer: COMMERCIAL

## 2024-10-22 DIAGNOSIS — E53.8 VITAMIN B12 DEFICIENCY: ICD-10-CM

## 2024-10-22 PROCEDURE — 96372 THER/PROPH/DIAG INJ SC/IM: CPT | Performed by: INTERNAL MEDICINE

## 2024-10-22 NOTE — PROGRESS NOTES
Patient presents in the office for b12 injection.    This drug was administered by Ava Leroy MA at 10:56 AM per physician order.    Patient tolerated well.

## 2024-10-31 ENCOUNTER — APPOINTMENT (OUTPATIENT)
Dept: BEHAVIORAL HEALTH | Facility: CLINIC | Age: 69
End: 2024-10-31
Payer: MEDICARE

## 2024-10-31 DIAGNOSIS — F33.42 RECURRENT MAJOR DEPRESSIVE DISORDER, IN FULL REMISSION (CMS-HCC): Primary | ICD-10-CM

## 2024-10-31 DIAGNOSIS — F41.9 ANXIETY: ICD-10-CM

## 2024-10-31 PROCEDURE — 1157F ADVNC CARE PLAN IN RCRD: CPT | Performed by: PSYCHIATRY & NEUROLOGY

## 2024-10-31 PROCEDURE — 1123F ACP DISCUSS/DSCN MKR DOCD: CPT | Performed by: PSYCHIATRY & NEUROLOGY

## 2024-10-31 PROCEDURE — 99215 OFFICE O/P EST HI 40 MIN: CPT | Performed by: PSYCHIATRY & NEUROLOGY

## 2024-10-31 RX ORDER — LAMOTRIGINE 150 MG/1
150 TABLET ORAL 2 TIMES DAILY
Qty: 180 TABLET | Refills: 1 | Status: SHIPPED | OUTPATIENT
Start: 2024-10-31 | End: 2025-04-29

## 2024-10-31 RX ORDER — DULOXETIN HYDROCHLORIDE 20 MG/1
20 CAPSULE, DELAYED RELEASE ORAL DAILY
Qty: 90 CAPSULE | Refills: 0 | Status: SHIPPED | OUTPATIENT
Start: 2024-10-31 | End: 2025-01-29

## 2024-10-31 ASSESSMENT — ENCOUNTER SYMPTOMS
DYSPHORIC MOOD: 0
NERVOUS/ANXIOUS: 1

## 2024-11-20 NOTE — PROGRESS NOTES
Subjective   Patient ID: Naomi Dunaway is a 69 y.o. female who presents November 20, 2024 for chiropractic care.    14 VPCY      HPI : Naomi returns today for chiropractic care of low back and neck discomfort. She reports increase in lower back pain that was exacerbated by moving boxes and assisting to move furniture while floors in her home are being worked on over the past few days. She localizes pain this morning across the lumbosacral region and achiness into the low back. Still more prominent on the left but felt bilaterally. No radicular complaints reported. Denies new trauma/incident.      Objective   Physical Exam  Neurological:      General: No focal deficit present.      Mental Status: She is alert and oriented to person, place, and time.      Cranial Nerves: No dysarthria or facial asymmetry.      Sensory: Sensation is intact.      Motor: Motor function is intact.      Coordination: Coordination is intact.      Gait: Gait is intact. Gait normal.         Palpation of the following region(s) revealed:  Cervical: Upper trapezius bilateral, muscular hypertonicity.  Levator scapulae bilateral, muscular hypertonicity.  Cervical paraspinals bilateral, muscular hypertonicity.  Thoracic: Pectoralis bilateral, muscular hypertonicity.  Lumbar: Lumbar paraspinals bilateral, hypertonicity and tenderness.  Quadratus lumborum bilateral, hypertonicity and tenderness.  Gluteal left, hypertonicity and tenderness.        Segmental Joint(s): Segmental joint dysfunction was assessed with motion palpation and is identified in the following areas:  Cervical : C0 C1 C5  Thoracic : T3 and T4  Lumbopelvic / Sacral SIJ : L3, L4, L5/S1, R SIJ, and L SIJ      Assessment/Plan   Today's Treatment Included: Chiropractic manipulation to the Segmental Joint(s) Cervical : C0 C1 C5 Segmental Joint(s) Lumbopelvic/Sacral SIJ : L3, L4, L5/S1, R SIJ, and L SIJ Segmental Joint(s) Thoracic : T3 and T4   Treatment Techniques Used : Pelvic drop  table technique, Flexion-distraction technique, Manual traction, and Low force    Soft-tissue mobilization was performed in the following areas:   Cervical paraspinal mm. bilateral, Upper Trapezius bilateral, and Levator Scap. bilateral  Middle Trapezius bilateral and Rhomboids bilateral  Lumbar Paraspinal mm. bilateral, Quadratus Lumborum bilateral, Gluteal mm. Glute. Med. bilateral, and Piriformis bilateral, psoas BL    RockTape to lumbosacral spine BL    FU in 1 month with Dr Benitez    The patient tolerated today's treatment with little or no additional discomfort and was instructed to contact the office for questions or concerns.

## 2024-11-21 ENCOUNTER — APPOINTMENT (OUTPATIENT)
Dept: INTEGRATIVE MEDICINE | Facility: CLINIC | Age: 69
End: 2024-11-21
Payer: MEDICARE

## 2024-11-21 DIAGNOSIS — M99.04 SEGMENTAL AND SOMATIC DYSFUNCTION OF SACRAL REGION: ICD-10-CM

## 2024-11-21 DIAGNOSIS — M99.01 SEGMENTAL AND SOMATIC DYSFUNCTION OF CERVICAL REGION: ICD-10-CM

## 2024-11-21 DIAGNOSIS — M54.2 NECK PAIN: ICD-10-CM

## 2024-11-21 DIAGNOSIS — M99.03 SEGMENTAL AND SOMATIC DYSFUNCTION OF LUMBAR REGION: Primary | ICD-10-CM

## 2024-11-21 DIAGNOSIS — M53.3 SACRAL BACK PAIN: ICD-10-CM

## 2024-11-21 DIAGNOSIS — M99.05 SEGMENTAL AND SOMATIC DYSFUNCTION OF PELVIC REGION: ICD-10-CM

## 2024-11-21 DIAGNOSIS — M50.30 DDD (DEGENERATIVE DISC DISEASE), CERVICAL: ICD-10-CM

## 2024-11-21 DIAGNOSIS — M79.10 MYALGIA: ICD-10-CM

## 2024-11-21 PROCEDURE — 98941 CHIROPRACT MANJ 3-4 REGIONS: CPT | Performed by: CHIROPRACTOR

## 2024-11-26 ENCOUNTER — APPOINTMENT (OUTPATIENT)
Dept: PRIMARY CARE | Facility: CLINIC | Age: 69
End: 2024-11-26
Payer: MEDICARE

## 2024-11-26 DIAGNOSIS — E53.8 VITAMIN B12 DEFICIENCY: Primary | ICD-10-CM

## 2024-11-26 PROCEDURE — 96372 THER/PROPH/DIAG INJ SC/IM: CPT | Performed by: INTERNAL MEDICINE

## 2024-11-26 RX ORDER — CYANOCOBALAMIN 1000 UG/ML
1000 INJECTION, SOLUTION INTRAMUSCULAR; SUBCUTANEOUS ONCE
Status: COMPLETED | OUTPATIENT
Start: 2024-11-26 | End: 2024-11-26

## 2024-12-10 ENCOUNTER — OFFICE VISIT (OUTPATIENT)
Dept: NEUROLOGY | Facility: HOSPITAL | Age: 69
End: 2024-12-10
Payer: MEDICARE

## 2024-12-10 VITALS
BODY MASS INDEX: 20.01 KG/M2 | RESPIRATION RATE: 18 BRPM | WEIGHT: 106 LBS | HEART RATE: 78 BPM | DIASTOLIC BLOOD PRESSURE: 62 MMHG | SYSTOLIC BLOOD PRESSURE: 118 MMHG | TEMPERATURE: 97.5 F | HEIGHT: 61 IN

## 2024-12-10 DIAGNOSIS — R26.9 GAIT DISORDER: Primary | ICD-10-CM

## 2024-12-10 PROCEDURE — 1159F MED LIST DOCD IN RCRD: CPT | Performed by: PSYCHIATRY & NEUROLOGY

## 2024-12-10 PROCEDURE — 1160F RVW MEDS BY RX/DR IN RCRD: CPT | Performed by: PSYCHIATRY & NEUROLOGY

## 2024-12-10 PROCEDURE — 1157F ADVNC CARE PLAN IN RCRD: CPT | Performed by: PSYCHIATRY & NEUROLOGY

## 2024-12-10 PROCEDURE — 1036F TOBACCO NON-USER: CPT | Performed by: PSYCHIATRY & NEUROLOGY

## 2024-12-10 PROCEDURE — 1126F AMNT PAIN NOTED NONE PRSNT: CPT | Performed by: PSYCHIATRY & NEUROLOGY

## 2024-12-10 PROCEDURE — 99213 OFFICE O/P EST LOW 20 MIN: CPT | Performed by: PSYCHIATRY & NEUROLOGY

## 2024-12-10 PROCEDURE — 3008F BODY MASS INDEX DOCD: CPT | Performed by: PSYCHIATRY & NEUROLOGY

## 2024-12-10 PROCEDURE — 1123F ACP DISCUSS/DSCN MKR DOCD: CPT | Performed by: PSYCHIATRY & NEUROLOGY

## 2024-12-10 ASSESSMENT — PAIN SCALES - GENERAL: PAINLEVEL_OUTOF10: 0-NO PAIN

## 2024-12-10 NOTE — PROGRESS NOTES
Subjective     Naomi Dunaway is a right handed  69 y.o. year old female who presents with No chief complaint on file..   Visit type: new patient visit     Pt has last been seen 3 years ago,   She no longer has myoclonus, has not had same for 10 years, she never notices it anymore. 6 weeks ago they changed her meds and she came off fluoxetine, and feels much better.  She continues to walk into things, used to fall all the time, she has not fallen in 6 months.   Her balance is not bad, she side swipes into things sometimes, she clips the coffee table when walking. She has always been clumsy, no recent change. She does not trip up the stairs anymore.   No Stiffness or slowness, memory is okay. Has typical ADD like symptoms not paying attention.     She has the essential tremor of her hands still. Both hands do get numb. Her median nerve fingers get numb when she uses it sometimes.       Prior history:  Patient used to be followed by Dr Oro for Myoclonus, thought to be related to SSRIs. Has not been seen for several years however. Continues to have these jerks, does not appear to be affecting her daily life.   Currently she feels that she is swerving and difficult time walking straight. Feels like a general rocking movement as if on a train. She denies dizziness, no vertigo. She does not think she wears to one side more than the other. She does fall, she trips a lot, she may fall more than she used to. Trips when going up the stairs. Thinks trips/falls once a week. No incoordination of hands. She has essential tremors of the hands, dad had it also. NO stiffness or slowness of movements.    Patient Active Problem List   Diagnosis    Sacral back pain    Allergic rhinitis    Anxiety    Attention deficit hyperactivity disorder (ADHD)    Sensorineural hearing loss (SNHL)    Chronic constipation    Spondylosis of cervical region without myelopathy or radiculopathy    Hyperlipidemia    Myoclonus    Asthma    Chronic  ethmoidal sinusitis    GERD (gastroesophageal reflux disease)    Nasal congestion    Abdominal bloating    Recurrent major depressive disorder, in full remission (CMS-HCC)    Degeneration of intervertebral disc of cervical region    Deviated nasal septum    Effusion of right knee    Right knee DJD    Injury of meniscus of right knee    Internal derangement of knee    Mass of soft tissue of right lower extremity    Memory loss    Myalgia    Postural kyphosis of thoracic region    Segmental and somatic dysfunction    Tinnitus    Tremor    Tear of lateral meniscus of right knee    Hypertrophy of both inferior nasal turbinates    Lipoma of breast    Malignant neoplasm of skin    Ptosis of both eyelids    S/P lateral meniscectomy of right knee    Primary osteoarthritis of right knee    Cobalamin deficiency    Dysesthesia    Sprain of talofibular ligament of left ankle    Vitamin D deficiency    Vitamin B12 deficiency      Past Medical History:   Diagnosis Date    Acute upper respiratory infection, unspecified 10/24/2018    Acute URI    Encounter for screening for human papillomavirus (HPV) 03/18/2014    Screening for human papillomavirus    Encounter for screening for malignant neoplasm of colon 03/18/2014    Colon cancer screening    Fasciculation 01/16/2015    Twitching    Other conditions influencing health status 03/18/2014    History of dyspareunia    Pain in left foot 08/20/2020    Left foot pain    Pain in right knee 09/05/2018    Right knee pain, unspecified chronicity    Personal history of other diseases of the respiratory system     History of sinus problem    Personal history of other specified conditions 11/24/2014    History of dyspnea    Personal history of other specified conditions 01/16/2015    History of weakness    Sprain of other ligament of left ankle, initial encounter 06/04/2020    Sprain of anterior talofibular ligament of left ankle, initial encounter    Unspecified sprain of left foot, initial  encounter 2020    Sprain of left foot, initial encounter    Vaginal atrophy 2023      Past Surgical History:   Procedure Laterality Date    FOOT SURGERY  2015    Foot Surgery    MR HEAD ANGIO WO IV CONTRAST  2015    MR HEAD ANGIO WO IV CONTRAST 2015 [facility]    OTHER SURGICAL HISTORY  2019    Nasal endoscopy    OTHER SURGICAL HISTORY  2017    Oral Surgery    OTHER SURGICAL HISTORY  2021    Meniscus repair      Social History     Socioeconomic History    Marital status:      Spouse name: Not on file    Number of children: Not on file    Years of education: Not on file    Highest education level: Not on file   Occupational History    Not on file   Tobacco Use    Smoking status: Former     Current packs/day: 0.00     Types: Cigarettes     Quit date:      Years since quittin.9    Smokeless tobacco: Never   Vaping Use    Vaping status: Never Used   Substance and Sexual Activity    Alcohol use: Yes     Comment: 1-2 a week    Drug use: Never    Sexual activity: Not on file   Other Topics Concern    Not on file   Social History Narrative    Not on file     Social Drivers of Health     Financial Resource Strain: Not on file   Food Insecurity: Not on file   Transportation Needs: Not on file   Physical Activity: Not on file   Stress: Not on file   Social Connections: Not on file   Intimate Partner Violence: Not on file   Housing Stability: Not on file      Family History   Problem Relation Name Age of Onset    Depression Mother      Hypertension Mother      Other (cardiac disorder) Mother      Depression Father      Other (cardiac disorder) Father      Other (food allergy) Father      Other (seasonal allergies) Father      Other (food allergy) Sister      Other (seasonal allergies) Sister      Other (sinus problem) Sister      Atrial fibrillation Other      Cancer Other                   Review of Systems  All other system have been reviewed and are  negative for complaint.  Objective   Vitals:    12/10/24 1520   BP: 118/62   Pulse: 78   Resp: 18   Temp: 36.4 °C (97.5 °F)      Neurological Exam  Mental Status  Awake, alert and oriented to person, place and time.    Cranial Nerves  CN II: Visual fields full to confrontation.  CN III, IV, VI: Extraocular movements intact bilaterally.  CN VII: Full and symmetric facial movement.    Motor   Strength is 5/5 throughout all four extremities.    Reflexes                                            Right                      Left  Brachioradialis                    2+                         2+  Biceps                                 2+                         2+  Triceps                                2+                         2+  Patellar                                2+                         2+  Achilles                                1+                         1+  Right Plantar: mute  Left Plantar: mute    Right pathological reflexes: Gisel's absent.  Left pathological reflexes: Gisel's absent.    Coordination  Right: Finger-to-nose normal.Left: Finger-to-nose normal.    Gait  Casual gait is normal including stance, stride, and arm swing.Normal toe walking. Normal heel walking. Normal tandem gait. Romberg is absent. Able to rise from chair without using arms.                          Thyroid Stimulating Hormone   Date Value Ref Range Status   07/24/2024 1.56 0.44 - 3.98 mIU/L Final         MRI 2022:  FINDINGS:  CSF Spaces: The ventricles, sulci and basal cisterns are within  normal limits. No abnormal extra-axial collection.     Parenchyma: There is no diffusion restriction to suggest acute  infarction.  Nonspecific T2 hyperintense signal in the white matter,  similar to previous and likely secondary to chronic small vessel  ischemic disease. No evidence of intracranial hemorrhage. There is no  mass effect or midline shift.     Paranasal Sinuses and Mastoids: Pansinus mucosal thickening. The  mastoid air  cells are clear.     IMPRESSION:  No acute intracranial pathology.    Assessment/Plan   Diagnoses and all orders for this visit:  Gait disorder      Naomi Dunaway is a 69 y.o. year old female here for follow up. She has not been seen for over 3 years, previously had myoclonus but this has resolved w recent medication changes. Also has had gait changes, but no ataxia, no hyperreflexia on exam, and no falls. MRI done after last visit due to gait concerns. At this point she can follow up in clinic nn an as needed basis.   Pt expressed understanding and agreement.

## 2024-12-10 NOTE — LETTER
December 10, 2024     Sherly Soares MD  [address]  [facility] [place], [address]  [facility] [id number]    Patient: Naomi Dunaway   YOB: 1955   Date of Visit: 12/10/2024       Dear Dr. Sherly Soares MD:    Thank you for referring Naomi Dunaway to me for evaluation. Below are my notes for this consultation.  If you have questions, please do not hesitate to call me. I look forward to following your patient along with you.       Sincerely,     Tanya Mead MD      CC: No Recipients  ______________________________________________________________________________________    Subjective    Naomi Dunaway is a right handed  69 y.o. year old female who presents with No chief complaint on file..   Visit type: new patient visit     Pt has last been seen 3 years ago,   She no longer has myoclonus, has not had same for 10 years, she never notices it anymore. 6 weeks ago they changed her meds and she came off fluoxetine, and feels much better.  She continues to walk into things, used to fall all the time, she has not fallen in 6 months.   Her balance is not bad, she side swipes into things sometimes, she clips the coffee table when walking. She has always been clumsy, no recent change. She does not trip up the stairs anymore.   No Stiffness or slowness, memory is okay. Has typical ADD like symptoms not paying attention.     She has the essential tremor of her hands still. Both hands do get numb. Her median nerve fingers get numb when she uses it sometimes.       Prior history:  Patient used to be followed by Dr Oro for Myoclonus, thought to be related to SSRIs. Has not been seen for several years however. Continues to have these jerks, does not appear to be affecting her daily life.   Currently she feels that she is swerving and difficult time walking straight. Feels like a general rocking movement as if on a train. She denies dizziness, no vertigo. She does not think she wears to one side more than  the other. She does fall, she trips a lot, she may fall more than she used to. Trips when going up the stairs. Thinks trips/falls once a week. No incoordination of hands. She has essential tremors of the hands, dad had it also. NO stiffness or slowness of movements.    Patient Active Problem List   Diagnosis   • Sacral back pain   • Allergic rhinitis   • Anxiety   • Attention deficit hyperactivity disorder (ADHD)   • Sensorineural hearing loss (SNHL)   • Chronic constipation   • Spondylosis of cervical region without myelopathy or radiculopathy   • Hyperlipidemia   • Myoclonus   • Asthma   • Chronic ethmoidal sinusitis   • GERD (gastroesophageal reflux disease)   • Nasal congestion   • Abdominal bloating   • Recurrent major depressive disorder, in full remission (CMS-HCC)   • Degeneration of intervertebral disc of cervical region   • Deviated nasal septum   • Effusion of right knee   • Right knee DJD   • Injury of meniscus of right knee   • Internal derangement of knee   • Mass of soft tissue of right lower extremity   • Memory loss   • Myalgia   • Postural kyphosis of thoracic region   • Segmental and somatic dysfunction   • Tinnitus   • Tremor   • Tear of lateral meniscus of right knee   • Hypertrophy of both inferior nasal turbinates   • Lipoma of breast   • Malignant neoplasm of skin   • Ptosis of both eyelids   • S/P lateral meniscectomy of right knee   • Primary osteoarthritis of right knee   • Cobalamin deficiency   • Dysesthesia   • Sprain of talofibular ligament of left ankle   • Vitamin D deficiency   • Vitamin B12 deficiency      Past Medical History:   Diagnosis Date   • Acute upper respiratory infection, unspecified 10/24/2018    Acute URI   • Encounter for screening for human papillomavirus (HPV) 03/18/2014    Screening for human papillomavirus   • Encounter for screening for malignant neoplasm of colon 03/18/2014    Colon cancer screening   • Fasciculation 01/16/2015    Twitching   • Other conditions  influencing health status 2014    History of dyspareunia   • Pain in left foot 2020    Left foot pain   • Pain in right knee 2018    Right knee pain, unspecified chronicity   • Personal history of other diseases of the respiratory system     History of sinus problem   • Personal history of other specified conditions 2014    History of dyspnea   • Personal history of other specified conditions 2015    History of weakness   • Sprain of other ligament of left ankle, initial encounter 2020    Sprain of anterior talofibular ligament of left ankle, initial encounter   • Unspecified sprain of left foot, initial encounter 2020    Sprain of left foot, initial encounter   • Vaginal atrophy 2023      Past Surgical History:   Procedure Laterality Date   • FOOT SURGERY  2015    Foot Surgery   • MR HEAD ANGIO WO IV CONTRAST  2015    MR HEAD ANGIO WO IV CONTRAST 2015 [facility]   • OTHER SURGICAL HISTORY  2019    Nasal endoscopy   • OTHER SURGICAL HISTORY  2017    Oral Surgery   • OTHER SURGICAL HISTORY  2021    Meniscus repair      Social History     Socioeconomic History   • Marital status:      Spouse name: Not on file   • Number of children: Not on file   • Years of education: Not on file   • Highest education level: Not on file   Occupational History   • Not on file   Tobacco Use   • Smoking status: Former     Current packs/day: 0.00     Types: Cigarettes     Quit date:      Years since quittin.9   • Smokeless tobacco: Never   Vaping Use   • Vaping status: Never Used   Substance and Sexual Activity   • Alcohol use: Yes     Comment: 1-2 a week   • Drug use: Never   • Sexual activity: Not on file   Other Topics Concern   • Not on file   Social History Narrative   • Not on file     Social Drivers of Health     Financial Resource Strain: Not on file   Food Insecurity: Not on file   Transportation Needs: Not on file    Physical Activity: Not on file   Stress: Not on file   Social Connections: Not on file   Intimate Partner Violence: Not on file   Housing Stability: Not on file      Family History   Problem Relation Name Age of Onset   • Depression Mother     • Hypertension Mother     • Other (cardiac disorder) Mother     • Depression Father     • Other (cardiac disorder) Father     • Other (food allergy) Father     • Other (seasonal allergies) Father     • Other (food allergy) Sister     • Other (seasonal allergies) Sister     • Other (sinus problem) Sister     • Atrial fibrillation Other     • Cancer Other                   Review of Systems  All other system have been reviewed and are negative for complaint.  Objective  Vitals:    12/10/24 1520   BP: 118/62   Pulse: 78   Resp: 18   Temp: 36.4 °C (97.5 °F)      Neurological Exam  Mental Status  Awake, alert and oriented to person, place and time.    Cranial Nerves  CN II: Visual fields full to confrontation.  CN III, IV, VI: Extraocular movements intact bilaterally.  CN VII: Full and symmetric facial movement.    Motor   Strength is 5/5 throughout all four extremities.    Reflexes                                            Right                      Left  Brachioradialis                    2+                         2+  Biceps                                 2+                         2+  Triceps                                2+                         2+  Patellar                                2+                         2+  Achilles                                1+                         1+  Right Plantar: mute  Left Plantar: mute    Right pathological reflexes: Gisel's absent.  Left pathological reflexes: Gisel's absent.    Coordination  Right: Finger-to-nose normal.Left: Finger-to-nose normal.    Gait  Casual gait is normal including stance, stride, and arm swing.Normal toe walking. Normal heel walking. Normal tandem gait. Romberg is absent. Able to rise from chair  without using arms.                          Thyroid Stimulating Hormone   Date Value Ref Range Status   07/24/2024 1.56 0.44 - 3.98 mIU/L Final         MRI 2022:  FINDINGS:  CSF Spaces: The ventricles, sulci and basal cisterns are within  normal limits. No abnormal extra-axial collection.     Parenchyma: There is no diffusion restriction to suggest acute  infarction.  Nonspecific T2 hyperintense signal in the white matter,  similar to previous and likely secondary to chronic small vessel  ischemic disease. No evidence of intracranial hemorrhage. There is no  mass effect or midline shift.     Paranasal Sinuses and Mastoids: Pansinus mucosal thickening. The  mastoid air cells are clear.     IMPRESSION:  No acute intracranial pathology.    Assessment/Plan  Diagnoses and all orders for this visit:  Gait disorder      Naomi Dunaway is a 69 y.o. year old female here for follow up. She has not been seen for over 3 years, previously had myoclonus but this has resolved w recent medication changes. Also has had gait changes, but no ataxia, no hyperreflexia on exam, and no falls. MRI done after last visit due to gait concerns. At this point she can follow up in clinic nn an as needed basis.   Pt expressed understanding and agreement.

## 2024-12-16 ENCOUNTER — APPOINTMENT (OUTPATIENT)
Dept: INTEGRATIVE MEDICINE | Facility: CLINIC | Age: 69
End: 2024-12-16
Payer: MEDICARE

## 2024-12-16 DIAGNOSIS — M99.02 SEGMENTAL AND SOMATIC DYSFUNCTION OF THORACIC REGION: ICD-10-CM

## 2024-12-16 DIAGNOSIS — M99.04 SEGMENTAL AND SOMATIC DYSFUNCTION OF SACRAL REGION: ICD-10-CM

## 2024-12-16 DIAGNOSIS — M79.10 MYALGIA: ICD-10-CM

## 2024-12-16 DIAGNOSIS — M99.03 SEGMENTAL AND SOMATIC DYSFUNCTION OF LUMBAR REGION: Primary | ICD-10-CM

## 2024-12-16 DIAGNOSIS — M53.3 SACRAL BACK PAIN: ICD-10-CM

## 2024-12-16 DIAGNOSIS — M99.01 SEGMENTAL AND SOMATIC DYSFUNCTION OF CERVICAL REGION: ICD-10-CM

## 2024-12-16 DIAGNOSIS — M54.2 NECK PAIN: ICD-10-CM

## 2024-12-16 PROCEDURE — 98941 CHIROPRACT MANJ 3-4 REGIONS: CPT

## 2024-12-16 NOTE — PROGRESS NOTES
Subjective   Patient ID: Naomi Dunaway is a 69 y.o. female who presents December 16, 2024 for chiropractic care.    15 [facility]      HPI :   Naomi returns today for continued chiropractic care involving her low back and neck. She was previously under the care of my colleagues, Dr. Tracy and Dr. Harvey. Today, Naomi states that her back and neck have been feeling much better. They are done moving boxes and furniture, but she says that she recently got new shannon put in her condo and is in the process of getting new cabinets put in her kitchen now. She is still having tightness in low back and neck but not as achy. The patient denies any changes in health since her last encounter and will follow up as scheduled.    ____________________________________  Last visit - Dr. Tracy (11/21/24):  Naomi returns today for chiropractic care of low back and neck discomfort. She reports increase in lower back pain that was exacerbated by moving boxes and assisting to move furniture while floors in her home are being worked on over the past few days. She localizes pain this morning across the lumbosacral region and achiness into the low back. Still more prominent on the left but felt bilaterally. No radicular complaints reported. Denies new trauma/incident.      Objective   Physical Exam  Neurological:      General: No focal deficit present.      Mental Status: She is alert and oriented to person, place, and time.      Cranial Nerves: No dysarthria or facial asymmetry.      Sensory: Sensation is intact.      Motor: Motor function is intact.      Coordination: Coordination is intact.      Gait: Gait is intact. Gait normal.         Palpation of the following region(s) revealed:  Cervical: Upper trapezius bilateral, muscular hypertonicity.  Levator scapulae bilateral, muscular hypertonicity.  Cervical paraspinals bilateral, muscular hypertonicity.  Thoracic: Middle trapezius bilateral, muscular hypertonicity.  Rhomboids bilateral,  muscular hypertonicity.  Lumbar: Lumbar paraspinals bilateral, muscular hypertonicity.  Quadratus lumborum bilateral, muscular hypertonicity.  Gluteal bilateral, muscular hypertonicity.  Piriformis bilateral, muscular hypertonicity.        Segmental Joint(s): Segmental joint dysfunction was assessed with motion palpation and is identified in the following areas:  Cervical : C0 C1 C2 C5  Thoracic : T3, T4, and T8  Lumbopelvic / Sacral SIJ : L4, L5, L5/S1, R SIJ, and L SIJ      Assessment/Plan   Today's Treatment Included: Chiropractic manipulation to the Segmental Joint(s) Cervical : C0 C1 C5 Segmental Joint(s) Lumbopelvic/Sacral SIJ : L4, L5, L5/S1, R SIJ, and L SIJ Segmental Joint(s) Thoracic : T4, T5, and T7   Treatment Techniques Used : Diversified CMT, Pelvic drop table technique, Manual traction, and Low force    Soft-tissue mobilization was performed in the following areas:   Cervical paraspinal mm. bilateral, Upper Trapezius bilateral, and Levator Scap. bilateral  Middle Trapezius bilateral and Rhomboids bilateral  Lumbar Paraspinal mm. bilateral, Quadratus Lumborum bilateral, Gluteal mm. Glute. Max.  and Glute. Med. bilateral, and Piriformis bilateral      F/U in one month.     The patient tolerated today's treatment with little or no additional discomfort and was instructed to contact the office for questions or concerns.

## 2024-12-27 ENCOUNTER — APPOINTMENT (OUTPATIENT)
Dept: PRIMARY CARE | Facility: CLINIC | Age: 69
End: 2024-12-27
Payer: MEDICARE

## 2025-01-01 PROBLEM — J30.9 ALLERGIC RHINITIS: Status: RESOLVED | Noted: 2023-01-20 | Resolved: 2025-01-01

## 2025-01-02 ENCOUNTER — APPOINTMENT (OUTPATIENT)
Dept: PRIMARY CARE | Facility: CLINIC | Age: 70
End: 2025-01-02
Payer: MEDICARE

## 2025-01-02 DIAGNOSIS — E53.8 VITAMIN B12 DEFICIENCY: ICD-10-CM

## 2025-01-02 PROCEDURE — 96372 THER/PROPH/DIAG INJ SC/IM: CPT | Performed by: INTERNAL MEDICINE

## 2025-01-02 RX ADMIN — CYANOCOBALAMIN 1000 MCG: 1000 INJECTION, SOLUTION INTRAMUSCULAR; SUBCUTANEOUS at 10:55

## 2025-01-02 NOTE — PROGRESS NOTES
"  Subjective   Patient ID:   [id number]   Naomi Dunaway is a 69 y.o. female who presents for Follow-up (ACT 18).    Chief Complaint   Patient presents with    Follow-up     ACT 18      Patient presents for F/U of asthma and allergic rhinitis.    Since last visit, 4-2-24, patient saw Dr. Torres 7-24-24, who performed nasal endoscopy.  Results were significant for nasal septal deviation to the left and thick mucus draining from the middle meatus consistent with allergic mucus from the left nasal cavity.  There was no evidence of polyps.      ACT 18  PFT normal; cough is coming from vocal cords  Had vocal cord polyps removed when she was 20 yo    Still doing mometasone rinses  Using advair once a day in morning  Uses rescue inhaler before exercising; will use inhaler when she gets the coughing jags  No sinus infections  Baseline she has jags of coughing a couple times per day for several months; they come in spurts (less than 5-10 min); dry cough; starts with a tickle in her throat  Having breakthrough heartburn even when taking pantoprozole; has not tried any other RX medication  Has never seen vocal cord specialist  Was hoping that taking Advair consistently that the coughing issue would be eliminated  Moved to new home and has had dust all over; electric heat; has humidifier.                  Objective   Pulse 104   Ht 1.549 m (5' 1\")   Wt 48.1 kg (106 lb)   BMI 20.03 kg/m²      Physical Exam  Vitals and nursing note reviewed.   Constitutional:       General: She is awake.      Appearance: Normal appearance. She is well-developed and normal weight. She is not ill-appearing, toxic-appearing or diaphoretic.   HENT:      Head: Normocephalic and atraumatic.      Right Ear: Hearing normal.      Left Ear: Hearing normal.      Nose: No mucosal edema, congestion or rhinorrhea.      Right Turbinates: Not swollen.      Left Turbinates: Not swollen.   Eyes:      Extraocular Movements: Extraocular movements intact.      " Conjunctiva/sclera: Conjunctivae normal.      Pupils: Pupils are equal, round, and reactive to light.   Cardiovascular:      Rate and Rhythm: Normal rate and regular rhythm.      Heart sounds: No murmur heard.     No friction rub. No gallop.   Pulmonary:      Effort: No respiratory distress.      Breath sounds: No wheezing, rhonchi or rales.   Skin:     General: Skin is warm and dry.   Neurological:      Mental Status: She is alert.   Psychiatric:         Mood and Affect: Mood normal.         Behavior: Behavior normal. Behavior is cooperative.       Assessment/Plan     Asthma ([facility])  ACT is 18    I think her cough is most likely a vocal cord issue and not caused by her asthma. Her PFT is normal.     Continue Advair daily    Follow up in 6 months    GERD (gastroesophageal reflux disease)  Not controlled with pantoprazole    Change to Nexium 40 mg a day        Chronic cough  ENT referral for vocal cord evaluation    Switch Pantoprazole to Nexium (esomeprazole) daily on an empty stomach

## 2025-01-02 NOTE — ASSESSMENT & PLAN NOTE
ACT is 18    I think her cough is most likely a vocal cord issue and not caused by her asthma. Her PFT is normal.     Continue Advair daily    Follow up in 6 months

## 2025-01-03 ENCOUNTER — APPOINTMENT (OUTPATIENT)
Dept: ALLERGY | Facility: CLINIC | Age: 70
End: 2025-01-03
Payer: MEDICARE

## 2025-01-03 VITALS — HEIGHT: 61 IN | HEART RATE: 104 BPM | BODY MASS INDEX: 20.01 KG/M2 | WEIGHT: 106 LBS

## 2025-01-03 DIAGNOSIS — J45.20 MILD INTERMITTENT ASTHMA, UNSPECIFIED WHETHER COMPLICATED (HHS-HCC): Primary | ICD-10-CM

## 2025-01-03 DIAGNOSIS — K21.9 GASTROESOPHAGEAL REFLUX DISEASE, UNSPECIFIED WHETHER ESOPHAGITIS PRESENT: ICD-10-CM

## 2025-01-03 DIAGNOSIS — R05.3 CHRONIC COUGH: ICD-10-CM

## 2025-01-03 DIAGNOSIS — J30.9 ALLERGIC RHINITIS, UNSPECIFIED SEASONALITY, UNSPECIFIED TRIGGER: ICD-10-CM

## 2025-01-03 LAB
FEV1/FVC: 102 %
FEV1: NORMAL LITERS
FVC: NORMAL LITERS

## 2025-01-03 PROCEDURE — 1159F MED LIST DOCD IN RCRD: CPT | Performed by: ALLERGY & IMMUNOLOGY

## 2025-01-03 PROCEDURE — 1160F RVW MEDS BY RX/DR IN RCRD: CPT | Performed by: ALLERGY & IMMUNOLOGY

## 2025-01-03 PROCEDURE — 1123F ACP DISCUSS/DSCN MKR DOCD: CPT | Performed by: ALLERGY & IMMUNOLOGY

## 2025-01-03 PROCEDURE — 96160 PT-FOCUSED HLTH RISK ASSMT: CPT | Performed by: ALLERGY & IMMUNOLOGY

## 2025-01-03 PROCEDURE — 99214 OFFICE O/P EST MOD 30 MIN: CPT | Performed by: ALLERGY & IMMUNOLOGY

## 2025-01-03 PROCEDURE — 3008F BODY MASS INDEX DOCD: CPT | Performed by: ALLERGY & IMMUNOLOGY

## 2025-01-03 PROCEDURE — 1157F ADVNC CARE PLAN IN RCRD: CPT | Performed by: ALLERGY & IMMUNOLOGY

## 2025-01-03 PROCEDURE — 94375 RESPIRATORY FLOW VOLUME LOOP: CPT | Performed by: ALLERGY & IMMUNOLOGY

## 2025-01-03 RX ORDER — ESOMEPRAZOLE MAGNESIUM 40 MG/1
40 CAPSULE, DELAYED RELEASE ORAL
Qty: 30 CAPSULE | Refills: 11 | Status: SHIPPED | OUTPATIENT
Start: 2025-01-03 | End: 2026-01-03

## 2025-01-03 NOTE — PATIENT INSTRUCTIONS
ENT referral for vocal cord evaluation    GI referral for acid reflux    Switch Pantoprazole to Nexium (esomeprazole) daily on an empty stomach    Continue Advair daily    Follow up in 6 months

## 2025-01-09 ENCOUNTER — APPOINTMENT (OUTPATIENT)
Dept: INTEGRATIVE MEDICINE | Facility: CLINIC | Age: 70
End: 2025-01-09
Payer: MEDICARE

## 2025-01-09 DIAGNOSIS — M54.2 NECK PAIN: ICD-10-CM

## 2025-01-09 DIAGNOSIS — M99.04 SEGMENTAL AND SOMATIC DYSFUNCTION OF SACRAL REGION: ICD-10-CM

## 2025-01-09 DIAGNOSIS — M99.01 SEGMENTAL AND SOMATIC DYSFUNCTION OF CERVICAL REGION: Primary | ICD-10-CM

## 2025-01-09 DIAGNOSIS — M99.03 SEGMENTAL AND SOMATIC DYSFUNCTION OF LUMBAR REGION: ICD-10-CM

## 2025-01-09 DIAGNOSIS — M99.02 SEGMENTAL AND SOMATIC DYSFUNCTION OF THORACIC REGION: ICD-10-CM

## 2025-01-09 DIAGNOSIS — M53.3 SACRAL BACK PAIN: ICD-10-CM

## 2025-01-09 DIAGNOSIS — M79.10 MYALGIA: ICD-10-CM

## 2025-01-09 PROCEDURE — 98941 CHIROPRACT MANJ 3-4 REGIONS: CPT

## 2025-01-09 NOTE — PROGRESS NOTES
Subjective   Patient ID: Naomi Dunaway is a 69 y.o. female who presents January 9, 2025 for chiropractic care.    1 VPCY      HPI :   Naomi states that her new shannon and cabinets are finally installed and she is happy to be able to stay at home again. She is sick with a cold and is having a lot of tension in her upper back and neck. States her low back has been feeling good and doesn't want to do anything aggressive in low back today.   _________________________________  Last visit (12/16/24):  Naomi returns today for continued chiropractic care involving her low back and neck. She was previously under the care of my colleagues, Dr. Tracy and Dr. Harvey. Today, Naomi states that her back and neck have been feeling much better. They are done moving boxes and furniture, but she says that she recently got new shannon put in her condo and is in the process of getting new cabinets put in her kitchen now. She is still having tightness in low back and neck but not as achy. The patient denies any changes in health since her last encounter and will follow up as scheduled.          Objective   Physical Exam  Neurological:      General: No focal deficit present.      Mental Status: She is alert and oriented to person, place, and time.      Cranial Nerves: No dysarthria or facial asymmetry.      Sensory: Sensation is intact.      Motor: Motor function is intact.      Coordination: Coordination is intact.      Gait: Gait is intact. Gait normal.       Palpation of the following region(s) revealed:  Cervical: Suboccipitals bilateral, muscular hypertonicity.  Scalenes bilateral, muscular hypertonicity.  Upper trapezius bilateral, muscular hypertonicity.  Levator scapulae bilateral, muscular hypertonicity.  Cervical paraspinals bilateral, muscular hypertonicity.  Thoracic: Middle trapezius bilateral, muscular hypertonicity.  Rhomboids bilateral, muscular hypertonicity.  Lumbar: Gluteal bilateral, muscular  hypertonicity.  Piriformis bilateral, muscular hypertonicity.    Segmental Joint(s): Segmental joint dysfunction was assessed with motion palpation and is identified in the following areas:  Cervical : C0 C1 C2 C5  Thoracic : T4, T5, T6, and T7  Lumbopelvic / Sacral SIJ : L5/S1, R SIJ, and L SIJ      Assessment/Plan   Today's Treatment Included: Chiropractic manipulation to the Segmental Joint(s) Cervical : C0 C1 C5 Segmental Joint(s) Lumbopelvic/Sacral SIJ : L4, L5, L5/S1, R SIJ, and L SIJ Segmental Joint(s) Thoracic : T4, T5, T6, and T7   Treatment Techniques Used : Diversified CMT, Pelvic drop table technique, Manual traction, and Low force  Pin and stretch  Active release  Ischemic compression  Soft-Tissue manipulation    Soft-tissue mobilization was performed in the following areas:   Cervical paraspinal mm. bilateral, Upper Trapezius bilateral, and Levator Scap. bilateral  Middle Trapezius bilateral and Rhomboids bilateral  Lumbar Paraspinal mm. bilateral, Quadratus Lumborum bilateral, Gluteal mm. Glute. Max.  and Glute. Med. bilateral, and Piriformis bilateral    F/U in one month.     The patient tolerated today's treatment with little or no additional discomfort and was instructed to contact the office for questions or concerns.

## 2025-01-17 ENCOUNTER — OFFICE VISIT (OUTPATIENT)
Dept: OTOLARYNGOLOGY | Facility: CLINIC | Age: 70
End: 2025-01-17
Payer: MEDICARE

## 2025-01-17 VITALS
WEIGHT: 106.6 LBS | TEMPERATURE: 97.9 F | BODY MASS INDEX: 19.62 KG/M2 | SYSTOLIC BLOOD PRESSURE: 121 MMHG | HEIGHT: 62 IN | DIASTOLIC BLOOD PRESSURE: 47 MMHG

## 2025-01-17 DIAGNOSIS — J38.2 VOCAL FOLD NODULES: ICD-10-CM

## 2025-01-17 DIAGNOSIS — R49.0 MUSCLE TENSION DYSPHONIA: ICD-10-CM

## 2025-01-17 DIAGNOSIS — K21.9 LARYNGOPHARYNGEAL REFLUX (LPR): ICD-10-CM

## 2025-01-17 DIAGNOSIS — R49.0 HOARSENESS OF VOICE: ICD-10-CM

## 2025-01-17 DIAGNOSIS — R05.3 CHRONIC COUGH: Primary | ICD-10-CM

## 2025-01-17 DIAGNOSIS — J38.4 VOCAL CORD EDEMA: ICD-10-CM

## 2025-01-17 PROCEDURE — 1123F ACP DISCUSS/DSCN MKR DOCD: CPT

## 2025-01-17 PROCEDURE — 1157F ADVNC CARE PLAN IN RCRD: CPT

## 2025-01-17 PROCEDURE — 31579 LARYNGOSCOPY TELESCOPIC: CPT

## 2025-01-17 PROCEDURE — 1159F MED LIST DOCD IN RCRD: CPT

## 2025-01-17 PROCEDURE — 99214 OFFICE O/P EST MOD 30 MIN: CPT | Mod: 25

## 2025-01-17 PROCEDURE — 99214 OFFICE O/P EST MOD 30 MIN: CPT

## 2025-01-17 PROCEDURE — 3008F BODY MASS INDEX DOCD: CPT

## 2025-01-17 RX ORDER — MAGNESIUM CARB/ALUMINUM HYDROX 105-160MG
2 TABLET,CHEWABLE ORAL
Qty: 240 TABLET | Refills: 2 | Status: SHIPPED | OUTPATIENT
Start: 2025-01-17 | End: 2025-01-20

## 2025-01-17 NOTE — PROGRESS NOTES
Reason For Consult  Hoarseness, cough       HISTORY OF PRESENT ILLNESS:  This is a request for consultation from Dr. Tosha Salomon for Naomi Dunaway, who is a 69 y.o. female presenting for an initial visit for hoarseness, cough, and increased effort when swallowing.  The patient reports that she has been experiencing hoarseness, and has previous hx of singers nodes. Patient reports intermittent increased effort when swallowing. Patient has hx of asthma. Patient reports chronic cough. Patient reports using Nexium 40 mg daily. Patient reports post nasal drainage and is currently using Nasal rinses with mometasone. Patient followed by Dr. Torres with rhinology.  Reports that she quit smoking 30 years ago.       Past Medical History  She has a past medical history of Acute upper respiratory infection, unspecified (10/24/2018), Encounter for screening for human papillomavirus (HPV) (03/18/2014), Encounter for screening for malignant neoplasm of colon (03/18/2014), Fasciculation (01/16/2015), Other conditions influencing health status (03/18/2014), Pain in left foot (08/20/2020), Pain in right knee (09/05/2018), Personal history of other diseases of the respiratory system, Personal history of other specified conditions (11/24/2014), Personal history of other specified conditions (01/16/2015), Sprain of other ligament of left ankle, initial encounter (06/04/2020), Unspecified sprain of left foot, initial encounter (06/04/2020), and Vaginal atrophy (01/20/2023).  Surgical History  She has a past surgical history that includes Other surgical history (06/17/2019); Other surgical history (04/24/2017); Foot surgery (04/08/2015); Other surgical history (12/20/2021); and MR angio head wo IV contrast (1/23/2015).     Social History  She reports that she quit smoking about 29 years ago. Her smoking use included cigarettes. She has never used smokeless tobacco. She reports current alcohol use. She reports that she does not use  "drugs.    Allergies  Feathers, House dust, Mold, and Sulfamethoxazole    Review of Systems  All 10 systems were reviewed and negative except for above.      Physical Exam  CONSTITUTIONAL: Well developed, well nourished.    VOICE: moderate hoarseness  RESPIRATION: Breathing comfortably, no stridor.    NEURO: Alert and oriented x3, cranial nerves II-XII intact and symmetric bilaterally.    EARS: Normal external ears, external auditory canals, normal hearing to conversational voice.    NOSE: External nose midline, anterior rhinoscopy is normal with limited visualization to the anterior aspect of the interior turbinates. No lesions noted.     ORAL CAVITY/OROPHARYNX/LIPS: Normal mucous membranes, normal floor of mouth/tongue/OP, no masses or lesions are noted.    SKIN: Neck skin is intact scar or injury.    PSYCH: Alert and oriented with appropriate mood and affect.        Last Recorded Vitals  Blood pressure (!) 121/47, temperature 36.6 °C (97.9 °F), temperature source Temporal, height 1.562 m (5' 1.5\"), weight 48.4 kg (106 lb 9.6 oz).    Procedure  PROCEDURE NOTE:  Recommended flexible laryngoscopy/stroboscopy.  Risks, benefits,  and alternatives were explained.  They wish to proceed and provide verbal consent.     PROCEDURE:    Flexible laryngoscopy with stroboscopy, CPT 78386     POSTPROCEDURE DIAGNOSIS: Hoarseness, vc nodes, vocal edema.    INDICATIONS: Inability to tolerate mirror exam or abnormal findings on mirror, Flexible Laryngoscopy/Stroboscopy performed to assess one of the followin. Diagnosis of symptomatic disorder involving the voice, swallow, upper aerodigestive tract, including SRIDEVI disorders, or  2. Preoperative evaluation of vocal cord function for individuals undergoing surgery where the RLN or vagus nerves are at risk of injury, or  3. Further evaluation of abnormalities of the upper aerodigestive tract discovered by another modality, such as CT, MRI, bronchoscopy or EGD    Description of " Procedure:    After adequate afrin and lidocaine spray, I advanced the endoscope.  Visualization of the nasopharynx, vallecula, posterior pharyngeal walls, pyriform, epiglottis and post cricoid areas was unremarkable.  The following laryngeal findings were noted:    vocal cord movement was normal movement bilaterally.  closure was complete  Mucosal wave was symmetric   Compression was increased AP and FVC   edema was mild  interarytenoid edema present  Very mild bilateral mid cord nodes at striking zone. Right vallecular cyst, left base of tongue cyst.   the subglottis was widely patent  Pharyngeal wall squeeze was normal    Procedure well tolerated.       ASSESSMENT AND PLAN:   This is an initial visit for hoarseness and cough with clinical findings notable for normal bilateral VC movement with complete closure. Start of mid cord striking point nodes bilaterally. Bilateral VC edema. Small right vallecular cyst. Smaller left base of tongue mucus retention cyst. Discussed with patient voice therapy referral, will add on gaviscon after 2 main meals and at bedtime. Patient can follow up in 2-3 months. Patient agreeable to plan. All questions answered.       Diagnoses are exacerbated by:    We discussed the treatment options to include, medical and surgical options.  We have decided to proceed as follows:   Voice therapy referral-hoarseness, MTD, vc nodes.   2.    Continue Nexium 40 mg daily, will add gaviscon after meals and at bedtime.   3.    Follow up in 2-3 months.

## 2025-01-20 RX ORDER — MAGNESIUM CARB/ALUMINUM HYDROX 105-160MG
2 TABLET,CHEWABLE ORAL 3 TIMES DAILY
Qty: 240 TABLET | Refills: 2 | Status: SHIPPED | OUTPATIENT
Start: 2025-01-20 | End: 2026-01-20

## 2025-01-26 ENCOUNTER — PATIENT MESSAGE (OUTPATIENT)
Dept: BEHAVIORAL HEALTH | Facility: CLINIC | Age: 70
End: 2025-01-26
Payer: MEDICARE

## 2025-01-26 DIAGNOSIS — F33.42 RECURRENT MAJOR DEPRESSIVE DISORDER, IN FULL REMISSION (CMS-HCC): ICD-10-CM

## 2025-01-27 ENCOUNTER — APPOINTMENT (OUTPATIENT)
Dept: PRIMARY CARE | Facility: CLINIC | Age: 70
End: 2025-01-27
Payer: MEDICARE

## 2025-01-27 VITALS
WEIGHT: 104.4 LBS | SYSTOLIC BLOOD PRESSURE: 108 MMHG | HEIGHT: 62 IN | HEART RATE: 73 BPM | BODY MASS INDEX: 19.21 KG/M2 | TEMPERATURE: 97.9 F | DIASTOLIC BLOOD PRESSURE: 69 MMHG | OXYGEN SATURATION: 98 %

## 2025-01-27 DIAGNOSIS — K21.9 GASTROESOPHAGEAL REFLUX DISEASE, UNSPECIFIED WHETHER ESOPHAGITIS PRESENT: Primary | ICD-10-CM

## 2025-01-27 DIAGNOSIS — F33.2 MAJOR DEPRESSIVE DISORDER, RECURRENT SEVERE WITHOUT PSYCHOTIC FEATURES (MULTI): ICD-10-CM

## 2025-01-27 DIAGNOSIS — K59.09 CHRONIC CONSTIPATION: ICD-10-CM

## 2025-01-27 DIAGNOSIS — E53.8 VITAMIN B12 DEFICIENCY: ICD-10-CM

## 2025-01-27 DIAGNOSIS — Z00.00 ROUTINE GENERAL MEDICAL EXAMINATION AT A HEALTH CARE FACILITY: ICD-10-CM

## 2025-01-27 PROBLEM — R09.81 NASAL CONGESTION: Status: RESOLVED | Noted: 2023-06-13 | Resolved: 2025-01-27

## 2025-01-27 PROCEDURE — 1157F ADVNC CARE PLAN IN RCRD: CPT | Performed by: INTERNAL MEDICINE

## 2025-01-27 PROCEDURE — 1123F ACP DISCUSS/DSCN MKR DOCD: CPT | Performed by: INTERNAL MEDICINE

## 2025-01-27 PROCEDURE — 96372 THER/PROPH/DIAG INJ SC/IM: CPT | Performed by: INTERNAL MEDICINE

## 2025-01-27 PROCEDURE — 1159F MED LIST DOCD IN RCRD: CPT | Performed by: INTERNAL MEDICINE

## 2025-01-27 PROCEDURE — 99214 OFFICE O/P EST MOD 30 MIN: CPT | Performed by: INTERNAL MEDICINE

## 2025-01-27 PROCEDURE — 1036F TOBACCO NON-USER: CPT | Performed by: INTERNAL MEDICINE

## 2025-01-27 PROCEDURE — 3008F BODY MASS INDEX DOCD: CPT | Performed by: INTERNAL MEDICINE

## 2025-01-27 PROCEDURE — 1160F RVW MEDS BY RX/DR IN RCRD: CPT | Performed by: INTERNAL MEDICINE

## 2025-01-27 PROCEDURE — G2211 COMPLEX E/M VISIT ADD ON: HCPCS | Performed by: INTERNAL MEDICINE

## 2025-01-27 RX ORDER — ALBUTEROL SULFATE 90 UG/1
1-2 INHALANT RESPIRATORY (INHALATION) EVERY 6 HOURS PRN
Qty: 18 G | Refills: 0 | Status: SHIPPED | OUTPATIENT
Start: 2025-01-27

## 2025-01-27 RX ADMIN — CYANOCOBALAMIN 1000 MCG: 1000 INJECTION, SOLUTION INTRAMUSCULAR; SUBCUTANEOUS at 11:11

## 2025-01-27 ASSESSMENT — ENCOUNTER SYMPTOMS
ACTIVITY CHANGE: 0
SHORTNESS OF BREATH: 0
COUGH: 0
PALPITATIONS: 0
APPETITE CHANGE: 0

## 2025-01-27 NOTE — PROGRESS NOTES
Subjective   Patient ID: Naomi Dunaway is a 69 y.o. female who presents for Follow-up.  HPI  Here for follow up  Feels well  No complaints    Review of Systems   Constitutional:  Negative for activity change and appetite change.   Respiratory:  Negative for cough and shortness of breath.    Cardiovascular:  Negative for chest pain, palpitations and leg swelling.       Objective   Vitals:    01/27/25 1059   BP: 108/69   Pulse: 73   Temp: 36.6 °C (97.9 °F)   SpO2: 98%     Physical Exam  Vitals and nursing note reviewed.   Cardiovascular:      Rate and Rhythm: Normal rate and regular rhythm.   Pulmonary:      Effort: Pulmonary effort is normal.      Breath sounds: Normal breath sounds.           Assessment & Plan  Gastroesophageal reflux disease, unspecified whether esophagitis present  Better controlled with Nexium       Chronic constipation  Improved with B12 replacement; off Linzess       Major depressive disorder, recurrent severe without psychotic features (Multi)  Sees psychiatry         Vitamin B12 deficiency    Orders:    CBC; Future    Vitamin B12; Future    Routine general medical examination at a health care facility    Orders:    albuterol 90 mcg/actuation inhaler; Inhale 1-2 puffs every 6 hours if needed for shortness of breath.    CBC; Future    Comprehensive Metabolic Panel; Future    Lipid Panel; Future    TSH with reflex to Free T4 if abnormal; Future    Follow up with me in July asscheduled

## 2025-02-05 RX ORDER — DULOXETINE 40 MG/1
80 CAPSULE, DELAYED RELEASE ORAL DAILY
Qty: 60 CAPSULE | Refills: 0 | Status: SHIPPED | OUTPATIENT
Start: 2025-02-05 | End: 2025-03-07

## 2025-02-14 DIAGNOSIS — K21.9 GASTROESOPHAGEAL REFLUX DISEASE, UNSPECIFIED WHETHER ESOPHAGITIS PRESENT: Primary | ICD-10-CM

## 2025-02-14 RX ORDER — PANTOPRAZOLE SODIUM 40 MG/1
40 TABLET, DELAYED RELEASE ORAL
Qty: 30 TABLET | Refills: 11 | Status: SHIPPED | OUTPATIENT
Start: 2025-02-14 | End: 2026-02-14

## 2025-02-19 DIAGNOSIS — Z00.00 ROUTINE GENERAL MEDICAL EXAMINATION AT A HEALTH CARE FACILITY: ICD-10-CM

## 2025-02-19 RX ORDER — ALBUTEROL SULFATE 90 UG/1
1-2 INHALANT RESPIRATORY (INHALATION) EVERY 6 HOURS PRN
Qty: 18 G | Refills: 1 | Status: SHIPPED | OUTPATIENT
Start: 2025-02-19

## 2025-03-06 ENCOUNTER — EVALUATION (OUTPATIENT)
Dept: SPEECH THERAPY | Facility: CLINIC | Age: 70
End: 2025-03-06
Payer: MEDICARE

## 2025-03-06 DIAGNOSIS — J38.2 VOCAL FOLD NODULES: ICD-10-CM

## 2025-03-06 DIAGNOSIS — R49.0 MUSCLE TENSION DYSPHONIA: ICD-10-CM

## 2025-03-06 DIAGNOSIS — R49.0 HOARSENESS OF VOICE: ICD-10-CM

## 2025-03-06 DIAGNOSIS — R49.0 DYSPHONIA: Primary | ICD-10-CM

## 2025-03-06 PROCEDURE — 92507 TX SP LANG VOICE COMM INDIV: CPT | Mod: GN | Performed by: STUDENT IN AN ORGANIZED HEALTH CARE EDUCATION/TRAINING PROGRAM

## 2025-03-06 PROCEDURE — 92524 BEHAVRAL QUALIT ANALYS VOICE: CPT | Mod: GN | Performed by: STUDENT IN AN ORGANIZED HEALTH CARE EDUCATION/TRAINING PROGRAM

## 2025-03-06 ASSESSMENT — PAIN - FUNCTIONAL ASSESSMENT: PAIN_FUNCTIONAL_ASSESSMENT: 0-10

## 2025-03-06 ASSESSMENT — PAIN SCALES - GENERAL: PAINLEVEL_OUTOF10: 0 - NO PAIN

## 2025-03-06 NOTE — PROGRESS NOTES
Speech-Language Pathology    Voice/TEP Evaluation    Patient Name: Naomi Dunaway  MRN: [id number]  Today's Date: 3/6/2025  Time Calculation  Start Time: 1110  Stop Time: 1155  Time Calculation (min): 45 min         Current Problem:  Patient Active Problem List   Diagnosis    Sacral back pain    Anxiety    Attention deficit hyperactivity disorder (ADHD)    Sensorineural hearing loss (SNHL)    Chronic constipation    Spondylosis of cervical region without myelopathy or radiculopathy    Hyperlipidemia    Myoclonus    Asthma    Chronic ethmoidal sinusitis    GERD (gastroesophageal reflux disease)    Abdominal bloating    Recurrent major depressive disorder, in full remission (CMS-HCC)    Degeneration of intervertebral disc of cervical region    Deviated nasal septum    Effusion of right knee    Right knee DJD    Injury of meniscus of right knee    Internal derangement of knee    Mass of soft tissue of right lower extremity    Memory loss    Myalgia    Postural kyphosis of thoracic region    Segmental and somatic dysfunction    Tinnitus    Tremor    Tear of lateral meniscus of right knee    Hypertrophy of both inferior nasal turbinates    Lipoma of breast    Malignant neoplasm of skin    Ptosis of both eyelids    S/P lateral meniscectomy of right knee    Primary osteoarthritis of right knee    Cobalamin deficiency    Dysesthesia    Sprain of talofibular ligament of left ankle    Vitamin D deficiency    Vitamin B12 deficiency    Chronic cough    Major depressive disorder, recurrent severe without psychotic features (Multi)    Dysphonia     Assessment:  Patient presents with dysphonia 2/2 a diagnosis of mild vocal cord nodules and presence of MTD. Patient appears to be an excellent candidate for therapy which will target vocal hygiene and wellness and tension reduction strategies.    Plan of Care:  Voice Plan of Care  Frequency: PRN until discharge  Number of Visits: 2-4 visits  Recommendations for therapeutic interventions:  Vocal hygiene program, Speech/Voice exercises, Irritable larynx retraining  Prognosis: Good    SHORT TERM GOALS   Patient will increase vocal wellness and decrease phono trauma in adherence with clinician prescribed vocal hygiene and wellness program per patient report 80% of his/her day.  Establish Date:  3/6/2025      Timeframe: 3 months   Re-Eval date: 6/4/2025       Status: Progressing  Comments:    Patient will independently execute strategies for cough reduction 80-90% of the time, per therapeutic observation and patient self-report.  Establish Date:  3/6/2025      Timeframe: 3 months   Re-Eval date: 6/4/2025       Status: Progressing  Comments:      Long-term goals:  Patient will demonstrate reduction in symptoms as evidenced by improved scores on VHI-10, RSI, and/or CSI following treatment.    Patient will increase ability to produce voice without tension within 5 minute conversational task x 80% accuracy as judged by clinician observation and/or patient report.  Patient will demonstrate independent use of voice/swallow/breathing techniques x 80% accuracy.  Patient will increase the balance/strength of the respiratory/laryngeal/swallowing musculature x 80% accuracy.    LONG TERM GOALS  1. Patient will improve overall respiratory and vocal health to foster increased participation levels at home and in the community environment.  2. Patient will demonstrate reduction in symptoms as evidenced by improved scores on VHI-10 and RSI in follow-up assessment.    Contributing Factors:   Dysphonia Factors: Supraglottic compression/MTD , Abnormal vibratory mechanism secondary to physical changes , Exposure to biological irritants (GERD/LPR) , Inadequate intake of fluids for hydration    Voice Use Inventory:  Voice Use Inventory  Voice misuse/abuse: None  Exposure to Noise: No  Exposure to respiratory irritants: No  Consistent use of singing voice: No  Occupation relies on speaking voice: No    Perceptual Voice  "Features:  Intonation: WFL  Loudness: WFL  Nasal resonance: mild hypernasality    Maximum Phonation Time: 17.6 seconds (average for female adults=15-25 seconds; male adults=25-35 seconds)  Average pitch: 161 Hz  Min-Max pitch: 171-491 Hz  Avg volume: 70 dB  s/z ratio: 1.02  Diadochokinetic Rate: 10 repetitions of \"butter\" in 5 seconds (WFL = 10 reps)    Additional vocal characteristics noted included:  Throat clears/coughing     Voice quality based on the GRBAS scale: 0=absent; 1=mild; 2=moderate; 3=severe    stGstrstastdstest:st st1st Roughness: 0    Breathiness: 0    Asthenia: 0    Strain: 0    General Visit Information:  General Information  Patient Class: Outpatient  Living Environment: Home, Live with __ (spouse)  Arrival: Independent  Certification Period Start Date: 03/06/25  Certification Period End Date: 06/04/25    Subjective:   Pt referred to ENT from PCP. History of asthma and post nasal drainage. Mild MTD and mild vocal nodules at striking point. Pt reports chronic throat clearing and coughing in response to secretion management.    Pain  Pain Assessment  Pain Assessment: 0-10  0-10 (Numeric) Pain Score: 0 - No pain    Voice Use Inventory:  Voice Use Inventory  Voice misuse/abuse: None  Exposure to Noise: No  Exposure to respiratory irritants: No  Consistent use of singing voice: No  Occupation relies on speaking voice: No    VHI-10  VHI-10  My voice makes it difficult for people to hear me.: Never  People have difficulty understanding me in a noisy room.: Never  My voice difficulties restrict my personal and social life.: Never  I feel left out of the conversations because of my voice: Never  My voice problem causes me to lose income.: Never  I feel as though I have to strain to produce voice.: Never  The clarity of my voice is unpredictable.: Almost never  My voice problem upsets me.: Never  My voice makes me feel handicapped.: Never  People ask, \"What's wrong with your voice?\": Never  VHI-10 Total Score: " 1    Patient Self Assessment:  Patient Self Assessment  Daily water intake: Yes (~40oz)  Daily caffeine intake: Yes (1 cup/day)  Alcohol intake: Yes (socially)  Smoking history: No  Reflux history: Yes    Voice Treatment:  Voice Treatment   Individual(s) Educated: Patient  Verbal Education: Risks/benefits of therapy, Exercises  Written Education Provided: Exercises  Response to Education: Verbalized understanding    Instructed patient this date in:  Cough/throat clear reduction techniques (effortful swallow, RTB, Dorothy, silent cough/clear) , Bradley or warm water gargle technique to safely remove mucus , Vocal wellness strategies to reduce cough/throat clear triggers and phono trauma reflux , Lifestyle modifications , Muscle tension reduction strategies (head/shoulder/neck rolls and stretches, semi occluded vocal tract breathing techniques)    Clinician modeled all techniques and accurate patient follow through confirmed.  Handouts emailed/provided to facilitate optimal home carryover.    Outpatient Education:  Adult Outpatient Education  Individual(s) Educated: Patient  Written Education : yes  Verbal Education : yes  Diagnosis and Precautions: dysphonia  Risk and Benefits Discussed with Patient/Caregiver/Other: yes  Patient/Caregiver Demonstrated Understanding: yes  Plan of Care Discussed and Agreed Upon: yes  Patient Response to Education: Patient/Caregiver Asked Appropriate Questions, Patient/Caregiver Verbalized Understanding of Information

## 2025-03-13 ENCOUNTER — APPOINTMENT (OUTPATIENT)
Dept: BEHAVIORAL HEALTH | Facility: CLINIC | Age: 70
End: 2025-03-13
Payer: MEDICARE

## 2025-03-13 ENCOUNTER — APPOINTMENT (OUTPATIENT)
Dept: SPEECH THERAPY | Facility: CLINIC | Age: 70
End: 2025-03-13
Payer: MEDICARE

## 2025-03-13 ENCOUNTER — APPOINTMENT (OUTPATIENT)
Dept: GASTROENTEROLOGY | Facility: CLINIC | Age: 70
End: 2025-03-13
Payer: MEDICARE

## 2025-03-13 DIAGNOSIS — F41.9 ANXIETY: ICD-10-CM

## 2025-03-13 DIAGNOSIS — F33.42 RECURRENT MAJOR DEPRESSIVE DISORDER, IN FULL REMISSION (CMS-HCC): ICD-10-CM

## 2025-03-13 DIAGNOSIS — Z79.899 MEDICATION MANAGEMENT: ICD-10-CM

## 2025-03-13 PROCEDURE — 1123F ACP DISCUSS/DSCN MKR DOCD: CPT | Performed by: PSYCHIATRY & NEUROLOGY

## 2025-03-13 PROCEDURE — 99214 OFFICE O/P EST MOD 30 MIN: CPT | Performed by: PSYCHIATRY & NEUROLOGY

## 2025-03-13 PROCEDURE — 1157F ADVNC CARE PLAN IN RCRD: CPT | Performed by: PSYCHIATRY & NEUROLOGY

## 2025-03-13 RX ORDER — LAMOTRIGINE 150 MG/1
150 TABLET ORAL 2 TIMES DAILY
Qty: 180 TABLET | Refills: 1 | Status: SHIPPED | OUTPATIENT
Start: 2025-03-13 | End: 2025-09-09

## 2025-03-13 RX ORDER — LORAZEPAM 0.5 MG/1
0.5 TABLET ORAL DAILY PRN
Qty: 30 TABLET | Refills: 0 | Status: SHIPPED | OUTPATIENT
Start: 2025-03-13 | End: 2025-04-12

## 2025-03-13 RX ORDER — DULOXETINE 40 MG/1
80 CAPSULE, DELAYED RELEASE ORAL DAILY
Qty: 6 CAPSULE | Refills: 0 | Status: SHIPPED | OUTPATIENT
Start: 2025-03-09 | End: 2025-03-12

## 2025-03-13 RX ORDER — DULOXETIN HYDROCHLORIDE 60 MG/1
60 CAPSULE, DELAYED RELEASE ORAL DAILY
Qty: 90 CAPSULE | Refills: 1 | Status: SHIPPED | OUTPATIENT
Start: 2025-03-13 | End: 2025-09-09

## 2025-03-13 RX ORDER — DULOXETIN HYDROCHLORIDE 20 MG/1
20 CAPSULE, DELAYED RELEASE ORAL DAILY
Qty: 90 CAPSULE | Refills: 1 | Status: SHIPPED | OUTPATIENT
Start: 2025-03-13 | End: 2025-09-09

## 2025-03-13 NOTE — PROGRESS NOTES
"Adult Ambulatory Psychiatry Progress Note    Pt is at home ([id number]  [facility] [place] [id number] )  Writer is at home office      Assessment/Plan     Impression:  Naomi Dunaway is a 70 y.o. female domiciled with , retired who presents for follow up with [facility].    Plan:        Depression/anxiety - duloxetine 80mg daily, lamotrigine 150mg BID, lorazepam 0.5mg TID prn panic, c/w therapy, c/w med ed, psycho ed and supportive psychotherapy, f/u 3 months         Subjective   HPI:  Pt arrived on time. Mood \"ok\" since last session. She's had some stress because her son has had some legal trouble. Denies significant depressed mood. Anxiety is manageable. She notices an edge to her mood at times without the fluoxetine but all her tremors and myoclonus went away. Sleeping ok. Appetite ok. Taking medicines as prescribed.       OARRS:  Vinicius Acharya MD on 3/13/2025 10:44 AM  I have personally reviewed the OARRS report for Naomi Dunaway. I have considered the risks of abuse, dependence, addiction and diversion    Is the patient prescribed a combination of a benzodiazepine and opioid?  No    Last Urine Drug Screen / ordered today: Yes  No results found for this or any previous visit (from the past 8760 hours).  N/A      Controlled Substance Agreement:  Date of the Last Agreement: 3/13/25  Reviewed Controlled Substance Agreement including but not limited to the benefits, risks, and alternatives to treatment with a Controlled Substance medication(s).        Objective   Mental Status Exam:  General Appearance: Well groomed, appropriate eye contact  Attitude/Behavior: Cooperative  Motor: No psychomotor agitation or retardation, no tremor or other abnormal movements  Speech: Normal rate, volume, prosody  Mood: \"ok\"  Affect: Euthymic, full-range  Thought Process: Linear, goal directed  Thought Associations: No loosening of associations  Thought Content: Normal  Perception: No perceptual " abnormalities noted  Insight: Intact  Judgement: Intact    Vitals:  There were no vitals filed for this visit.    Current Medications:  Current Outpatient Medications on File Prior to Visit   Medication Sig Dispense Refill    Advair Diskus 100-50 mcg/dose diskus inhaler INHALE 1 PUFF BY MOUTH TWICE A DAY (MORNING AND EVENING ABOUT 12 HOURS APART) 60 each 2    albuterol 90 mcg/actuation inhaler Inhale 1-2 puffs every 6 hours if needed for shortness of breath. 18 g 1    aluminum hydrox-magnesium carb (Gaviscon Extra Strength) 160-105 mg tablet,chewable Chew 2 tablets 3 times a day. After meals and at bedtime 240 tablet 2    amphetamine-dextroamphetamine (Adderall) 10 mg tablet Take by mouth.      BIOTIN ORAL Take by mouth. daily      cholecalciferol (Vitamin D-3) 25 MCG (1000 UT) capsule Take 1 capsule (25 mcg) by mouth.      guaiFENesin (Mucinex) 1,200 mg tablet extended release 12hr Take 1 tablet (1,200 mg) by mouth every 12 hours.      mometasone furoate, bulk, 100 % powder 2 mg by sinus irrigation route once daily. 60 g 11    pantoprazole (Protonix) 40 mg EC tablet Take 1 tablet (40 mg) by mouth once daily in the morning. Take before meals. Do not crush, chew, or split. 30 tablet 11    [DISCONTINUED] DULoxetine 40 mg DR capsule Take 2 capsules (80 mg) by mouth once daily. with duloxetine 60mg. Do not crush or chew. 60 capsule 0    [DISCONTINUED] lamoTRIgine (LaMICtal) 150 mg tablet Take 1 tablet (150 mg) by mouth 2 times a day. 180 tablet 1    [DISCONTINUED] LORazepam (Ativan) 0.5 mg tablet Take 1 tablet (0.5 mg) by mouth 3 times a day as needed for anxiety for up to 10 days. 30 tablet 0     Current Facility-Administered Medications on File Prior to Visit   Medication Dose Route Frequency Provider Last Rate Last Admin    cyanocobalamin (Vitamin B-12) injection 1,000 mcg  1,000 mcg intramuscular q7 days Sherly Soares MD   1,000 mcg at 09/24/24 1301    cyanocobalamin (Vitamin B-12) injection 1,000 mcg  1,000 mcg  intramuscular q14 days Sherly Soares MD   1,000 mcg at 10/31/23 1353    cyanocobalamin (Vitamin B-12) injection 1,000 mcg  1,000 mcg intramuscular q14 days Sherly Soares MD   1,000 mcg at 01/27/25 1111       Orders:  Diagnoses and all orders for this visit:  Recurrent major depressive disorder, in full remission (CMS-HCC)  -     Follow Up In Psychiatry  -     DULoxetine (Cymbalta) 20 mg DR capsule; Take 1 capsule (20 mg) by mouth once daily. with duloxetine 60mg. Do not crush or chew.  -     DULoxetine (Cymbalta) 60 mg DR capsule; Take 1 capsule (60 mg) by mouth once daily. with duloxetine 20mg. Do not crush or chew.  -     lamoTRIgine (LaMICtal) 150 mg tablet; Take 1 tablet (150 mg) by mouth 2 times a day.  -     DULoxetine 40 mg DR capsule; Take 2 capsules (80 mg) by mouth once daily for 3 days.  -     Follow Up In Psychiatry; Future  Anxiety  -     DULoxetine (Cymbalta) 20 mg DR capsule; Take 1 capsule (20 mg) by mouth once daily. with duloxetine 60mg. Do not crush or chew.  -     DULoxetine (Cymbalta) 60 mg DR capsule; Take 1 capsule (60 mg) by mouth once daily. with duloxetine 20mg. Do not crush or chew.  -     LORazepam (Ativan) 0.5 mg tablet; Take 1 tablet (0.5 mg) by mouth once daily as needed for anxiety.  Medication management  -     Drug Screen, Urine With Reflex to Confirmation; Future        Time Spent:    Prep time: 2 min  Direct patient time: 23 min  Documentation time: 5 min  Total time: 30 min    Next Appointment:  Follow up in about 15 weeks (around 6/26/2025).

## 2025-03-18 ENCOUNTER — TREATMENT (OUTPATIENT)
Dept: SPEECH THERAPY | Facility: CLINIC | Age: 70
End: 2025-03-18
Payer: MEDICARE

## 2025-03-18 DIAGNOSIS — R49.0 DYSPHONIA: Primary | ICD-10-CM

## 2025-03-18 PROCEDURE — 92507 TX SP LANG VOICE COMM INDIV: CPT | Mod: GN | Performed by: STUDENT IN AN ORGANIZED HEALTH CARE EDUCATION/TRAINING PROGRAM

## 2025-03-18 ASSESSMENT — PAIN SCALES - GENERAL: PAINLEVEL_OUTOF10: 0 - NO PAIN

## 2025-03-18 ASSESSMENT — PAIN - FUNCTIONAL ASSESSMENT: PAIN_FUNCTIONAL_ASSESSMENT: 0-10

## 2025-03-26 ENCOUNTER — CLINICAL SUPPORT (OUTPATIENT)
Dept: PRIMARY CARE | Facility: CLINIC | Age: 70
End: 2025-03-26
Payer: MEDICARE

## 2025-03-26 DIAGNOSIS — E53.8 VITAMIN B12 DEFICIENCY: ICD-10-CM

## 2025-03-26 PROCEDURE — 96372 THER/PROPH/DIAG INJ SC/IM: CPT | Performed by: INTERNAL MEDICINE

## 2025-03-26 RX ADMIN — CYANOCOBALAMIN 1000 MCG: 1000 INJECTION, SOLUTION INTRAMUSCULAR; SUBCUTANEOUS at 10:53

## 2025-03-27 NOTE — PROGRESS NOTES
Naomi Dunaway is a 70 y.o. female with past medical history of asthma, vitamin B12 deficiency, depression, and IBS-C who is referred by Dr. Tosha Salomon for GERD. She reports several year history of reflux and heartburn. She also had a lot of coughing and sinus drainage so saw Allergy. Started pantoprazole about a year ago. This worked for a while but then started having breakthrough heartburn.    1/2025 Switched to Nexium. This helped with heartburn but caused constipation so she switched back to pantoprazole. This is not working and she is having heartburn almost daily. Associated with throat discomfort, upper chest pain. She has been taking Gaviscon as needed. No abdominal pain, nausea, vomiting, or difficulty eating but endorses intermittent dysphagia. This is not frequent and she has not had choking episodes. No unintentional weight loss. Bowels returned to normal. No longer feeling constipated (in the past was on Linzess but she has been able to come off this).    She is not anemic. No NSAIDS. Remote EGD in her 20s and told she has a hiatal hernia. Colonoscopy 2021 with one tubular adenoma, diverticulosis. Repeat 5 years.    Social history: Prior tobacco over 30 years ago. She has about 2 alcoholic drinks per week. Denies illicit drug use.    Family history: Father had esophageal stricture. Denies family history of colon cancer or other GI disorders or malignancy.     Past Surgical History:   Procedure Laterality Date    FOOT SURGERY  04/08/2015    Foot Surgery    MR HEAD ANGIO WO IV CONTRAST  1/23/2015    MR HEAD ANGIO WO IV CONTRAST 1/23/2015 [facility] ANCILLARY LEGACY    OTHER SURGICAL HISTORY  06/17/2019    Nasal endoscopy    OTHER SURGICAL HISTORY  04/24/2017    Oral Surgery    OTHER SURGICAL HISTORY  12/20/2021    Meniscus repair     Current Outpatient Medications   Medication Sig Dispense Refill    Advair Diskus 100-50 mcg/dose diskus inhaler INHALE 1 PUFF BY MOUTH TWICE A DAY (MORNING AND EVENING ABOUT  12 HOURS APART) 60 each 2    albuterol 90 mcg/actuation inhaler Inhale 1-2 puffs every 6 hours if needed for shortness of breath. 18 g 1    aluminum hydrox-magnesium carb (Gaviscon Extra Strength) 160-105 mg tablet,chewable Chew 2 tablets 3 times a day. After meals and at bedtime 240 tablet 2    amphetamine-dextroamphetamine (Adderall) 10 mg tablet Take by mouth.      BIOTIN ORAL Take by mouth. daily      cholecalciferol (Vitamin D-3) 25 MCG (1000 UT) capsule Take 1 capsule (25 mcg) by mouth.      DULoxetine (Cymbalta) 20 mg DR capsule Take 1 capsule (20 mg) by mouth once daily. with duloxetine 60mg. Do not crush or chew. 90 capsule 1    DULoxetine (Cymbalta) 60 mg DR capsule Take 1 capsule (60 mg) by mouth once daily. with duloxetine 20mg. Do not crush or chew. 90 capsule 1    guaiFENesin (Mucinex) 1,200 mg tablet extended release 12hr Take 1 tablet (1,200 mg) by mouth every 12 hours.      lamoTRIgine (LaMICtal) 150 mg tablet Take 1 tablet (150 mg) by mouth 2 times a day. 180 tablet 1    LORazepam (Ativan) 0.5 mg tablet Take 1 tablet (0.5 mg) by mouth once daily as needed for anxiety. 30 tablet 0    mometasone furoate, bulk, 100 % powder 2 mg by sinus irrigation route once daily. 60 g 11    DULoxetine 40 mg DR capsule Take 2 capsules (80 mg) by mouth once daily for 3 days. (Patient not taking: Reported on 4/4/2025) 6 capsule 0    pantoprazole (Protonix) 40 mg EC tablet Take 1 tablet (40 mg) by mouth 2 times a day. Do not crush, chew, or split. 180 tablet 3     Current Facility-Administered Medications   Medication Dose Route Frequency Provider Last Rate Last Admin    cyanocobalamin (Vitamin B-12) injection 1,000 mcg  1,000 mcg intramuscular q7 days Sherly Soares MD   1,000 mcg at 09/24/24 1301    cyanocobalamin (Vitamin B-12) injection 1,000 mcg  1,000 mcg intramuscular q14 days Sherly Soares MD   1,000 mcg at 10/31/23 1353    cyanocobalamin (Vitamin B-12) injection 1,000 mcg  1,000 mcg intramuscular q14 days  "Sherly Soares MD   1,000 mcg at 03/26/25 1053         Review of Systems  Review of Systems negative except as noted in HPI.    Objective     /66   Pulse 78   Temp 36.4 °C (97.6 °F)   Ht 1.562 m (5' 1.5\")   Wt 48.4 kg (106 lb 9.6 oz)   BMI 19.82 kg/m²      Physical Exam  Constitutional:  No acute distress. Normal appearance. Not ill-appearing.  HENT:  Head normocephalic and atraumatic. Conjunctivae normal.  Cardiovascular:  Normal rate. Regular rhythm.  Pulmonary:  Pulmonary effort normal. No respiratory distress. Breath sounds clear.  Abdominal:  Abdomen is flat and soft. There is no distension. No tenderness or guarding.  Skin: Dry.  Neurological:  Alert and oriented.  Psychiatric:  Mood and affect normal.    Assessment/Plan     70 y.o. female with history of asthma, vitamin B12 deficiency, depression, and IBS-C who presents today for clinic visit for several year history of heartburn and reflux. This used to be controlled with PPI but this is no longer working and she is having daily symptoms with intermittent dysphagia. We will increase PPI to BID dosing and arrange for EGD.    History colon polyps - Colonoscopy 2021 with 1 tubular adenoma. Repeat 5 years.    Recommendations  Increase pantoprazole to 40 mg twice daily. Take on empty stomach.  Schedule EGD.   Follow up 2 weeks after procedure.    Electronically signed by: Tawanna Rodrigues CNP on 4/4/2025 at 10:27 AM      "

## 2025-04-04 ENCOUNTER — OFFICE VISIT (OUTPATIENT)
Dept: GASTROENTEROLOGY | Facility: CLINIC | Age: 70
End: 2025-04-04
Payer: MEDICARE

## 2025-04-04 VITALS
TEMPERATURE: 97.6 F | BODY MASS INDEX: 19.62 KG/M2 | HEART RATE: 78 BPM | HEIGHT: 62 IN | SYSTOLIC BLOOD PRESSURE: 103 MMHG | WEIGHT: 106.6 LBS | DIASTOLIC BLOOD PRESSURE: 66 MMHG

## 2025-04-04 DIAGNOSIS — R13.10 DYSPHAGIA, UNSPECIFIED TYPE: ICD-10-CM

## 2025-04-04 DIAGNOSIS — K21.9 GASTROESOPHAGEAL REFLUX DISEASE, UNSPECIFIED WHETHER ESOPHAGITIS PRESENT: Primary | ICD-10-CM

## 2025-04-04 DIAGNOSIS — R12 HEARTBURN: ICD-10-CM

## 2025-04-04 PROCEDURE — 99204 OFFICE O/P NEW MOD 45 MIN: CPT | Performed by: NURSE PRACTITIONER

## 2025-04-04 PROCEDURE — 99214 OFFICE O/P EST MOD 30 MIN: CPT | Performed by: NURSE PRACTITIONER

## 2025-04-04 PROCEDURE — 3008F BODY MASS INDEX DOCD: CPT | Performed by: NURSE PRACTITIONER

## 2025-04-04 PROCEDURE — 1157F ADVNC CARE PLAN IN RCRD: CPT | Performed by: NURSE PRACTITIONER

## 2025-04-04 PROCEDURE — 1123F ACP DISCUSS/DSCN MKR DOCD: CPT | Performed by: NURSE PRACTITIONER

## 2025-04-04 PROCEDURE — 1159F MED LIST DOCD IN RCRD: CPT | Performed by: NURSE PRACTITIONER

## 2025-04-04 RX ORDER — PANTOPRAZOLE SODIUM 40 MG/1
40 TABLET, DELAYED RELEASE ORAL 2 TIMES DAILY
Qty: 180 TABLET | Refills: 3 | Status: SHIPPED | OUTPATIENT
Start: 2025-04-04

## 2025-04-04 NOTE — PATIENT INSTRUCTIONS
Recommendations  Increase pantoprazole to 40 mg twice daily. Take on empty stomach.  Obtain EGD. You can call [phone] to schedule.   Follow up 2 weeks after procedure.

## 2025-04-08 ENCOUNTER — APPOINTMENT (OUTPATIENT)
Dept: INTEGRATIVE MEDICINE | Facility: CLINIC | Age: 70
End: 2025-04-08
Payer: MEDICARE

## 2025-04-08 DIAGNOSIS — M99.04 SEGMENTAL AND SOMATIC DYSFUNCTION OF SACRAL REGION: ICD-10-CM

## 2025-04-08 DIAGNOSIS — M79.10 MYALGIA: ICD-10-CM

## 2025-04-08 DIAGNOSIS — M99.02 SEGMENTAL AND SOMATIC DYSFUNCTION OF THORACIC REGION: ICD-10-CM

## 2025-04-08 DIAGNOSIS — M54.2 NECK PAIN: ICD-10-CM

## 2025-04-08 DIAGNOSIS — M99.03 SEGMENTAL AND SOMATIC DYSFUNCTION OF LUMBAR REGION: ICD-10-CM

## 2025-04-08 DIAGNOSIS — M99.01 SEGMENTAL AND SOMATIC DYSFUNCTION OF CERVICAL REGION: Primary | ICD-10-CM

## 2025-04-08 DIAGNOSIS — M53.3 SACRAL BACK PAIN: ICD-10-CM

## 2025-04-08 PROCEDURE — 98941 CHIROPRACT MANJ 3-4 REGIONS: CPT

## 2025-04-08 NOTE — PROGRESS NOTES
Subjective   Patient ID: Naomi Dunaway is a 70 y.o. female who presents April 8, 2025 for chiropractic care.    (2) [facility]    Today, the patient rates their degree of pain as a 4 out of 10 on the numeric pain rating scale.     Naomi was in [place] for a while, and when she returned she noticed she was having some sinus pain and got sick for a while, so she hasn't been in. She was excited to return today for work on her neck and upper back, because she is still noticing tension from being sick. Localizes pain to the base of her skull and neck. Low back has still been feeling ok. Tried activator adjustments today through neck and upper back and will follow up in one month.   _______________________________  Last visit (1/9/2025):  Naomi states that her new shannon and cabinets are finally installed and she is happy to be able to stay at home again. She is sick with a cold and is having a lot of tension in her upper back and neck. States her low back has been feeling good and doesn't want to do anything aggressive in low back today.   _________________________________  Last visit (12/16/24):  Naomi returns today for continued chiropractic care involving her low back and neck. She was previously under the care of my colleagues, Dr. Tracy and Dr. Harvey. Today, Naomi states that her back and neck have been feeling much better. They are done moving boxes and furniture, but she says that she recently got new shannon put in her condo and is in the process of getting new cabinets put in her kitchen now. She is still having tightness in low back and neck but not as achy. The patient denies any changes in health since her last encounter and will follow up as scheduled.          Objective   Physical Exam  Musculoskeletal:      Cervical back: Tenderness present. Pain with movement present. Decreased range of motion.      Thoracic back: Tenderness present.      Lumbar back: Decreased range of motion.         Back:          Palpation of the following regions revealed:  Cervical: Suboccipitals bilateral, hypertonicity and tenderness.  Scalenes bilateral, muscular hypertonicity.  Upper trapezius bilateral, muscular hypertonicity.  Levator scapulae bilateral, muscular hypertonicity.  Cervical paraspinals bilateral, muscular hypertonicity.  Thoracic: Thoracic paraspinals bilateral, muscular hypertonicity.  Middle trapezius bilateral, muscular hypertonicity.  Lower trapezius bilateral, muscular hypertonicity.  Rhomboids bilateral, muscular hypertonicity.  Lumbar: Gluteal bilateral, muscular hypertonicity.  Piriformis bilateral, muscular hypertonicity.    Segmental Joint(s): Segmental joint dysfunction was assessed with motion palpation and is identified in the following areas:  Cervical : C0 C4 C5  Thoracic : T2., T4, T5, and T6  Lumbopelvic / Sacral SIJ : L5/S1, R SIJ, and L SIJ    Assessment/Plan   Today's Treatment Included: Spinal manipulation to the following regions of segmental dysfunction identified on examination, using age-appropriate and injury specific force. Segmental Joint(s) Cervical : C0 C4 C5 Segmental Joint(s) Thoracic : T2., T4, T5, and T6 Segmental Joint(s) Lumbopelvic/Sacral SIJ : L5/S1, R SIJ, and L SIJ  Chiropractic manipulation treatment techniques utilized: Pelvic drop table technique and Activator/Tool assisted technique  Soft tissue mobilization techniques utilized during treatment: Active Release Technique, Pin and Stretch, and Ischemic compression    Soft-tissue mobilization was performed in the following areas:  Suboccipital bilateral, Cervical paraspinal mm. bilateral, Scalenes bilateral, Upper Trapezius bilateral, and Levator Scap. bilateral  Thoracic Paraspinal mm. bilateral, Middle Trapezius bilateral, Lower Trapezius bilateral, and Rhomboids bilateral    Recommended follow-up in 1 month(s).     The patient tolerated today's treatment with little or no additional discomfort and was instructed  to contact the office for questions or concerns.

## 2025-04-11 DIAGNOSIS — R14.0 BLOATING: Primary | ICD-10-CM

## 2025-04-18 ENCOUNTER — APPOINTMENT (OUTPATIENT)
Dept: OTOLARYNGOLOGY | Facility: CLINIC | Age: 70
End: 2025-04-18
Payer: MEDICARE

## 2025-04-21 ENCOUNTER — APPOINTMENT (OUTPATIENT)
Dept: INTEGRATIVE MEDICINE | Facility: CLINIC | Age: 70
End: 2025-04-21
Payer: MEDICARE

## 2025-04-21 DIAGNOSIS — M99.03 SEGMENTAL AND SOMATIC DYSFUNCTION OF LUMBAR REGION: ICD-10-CM

## 2025-04-21 DIAGNOSIS — M79.10 MYALGIA: ICD-10-CM

## 2025-04-21 DIAGNOSIS — Z00.00 ROUTINE GENERAL MEDICAL EXAMINATION AT HEALTH CARE FACILITY: Primary | ICD-10-CM

## 2025-04-21 DIAGNOSIS — M99.01 SEGMENTAL AND SOMATIC DYSFUNCTION OF CERVICAL REGION: Primary | ICD-10-CM

## 2025-04-21 DIAGNOSIS — M99.04 SEGMENTAL AND SOMATIC DYSFUNCTION OF SACRAL REGION: ICD-10-CM

## 2025-04-21 DIAGNOSIS — M54.2 NECK PAIN: ICD-10-CM

## 2025-04-21 DIAGNOSIS — M53.3 SACRAL BACK PAIN: ICD-10-CM

## 2025-04-21 DIAGNOSIS — M99.02 SEGMENTAL AND SOMATIC DYSFUNCTION OF THORACIC REGION: ICD-10-CM

## 2025-04-21 PROCEDURE — 98941 CHIROPRACT MANJ 3-4 REGIONS: CPT

## 2025-04-21 RX ORDER — SCOPOLAMINE 1 MG/3D
1 PATCH, EXTENDED RELEASE TRANSDERMAL
COMMUNITY
End: 2025-04-21 | Stop reason: SDUPTHER

## 2025-04-21 RX ORDER — SCOPOLAMINE 1 MG/3D
1 PATCH, EXTENDED RELEASE TRANSDERMAL
Qty: 5 PATCH | Refills: 0 | Status: SHIPPED | OUTPATIENT
Start: 2025-04-21 | End: 2025-05-06

## 2025-04-21 NOTE — PROGRESS NOTES
Subjective   Patient ID: Naomi Dunaway is a 70 y.o. female who presents April 21, 2025 for chiropractic care.    (3) [facility]    Today, the patient rates their degree of pain as a 4 out of 10 on the numeric pain rating scale.     Naomi is preparing for a trip to [place] on Thursday and her back and neck have been bothering her again, so she wanted to get in before she left. She is nervous about getting her neck adjusted with the activator today due to something a friend mentioned about risk of stroke. We focused mostly on STM to neck and upper back and lower force adjustments through mid back and low back.   ___________________________  Last visit (4/8/2025):  Naomi was in [place] for a while, and when she returned she noticed she was having some sinus pain and got sick for a while, so she hasn't been in. She was excited to return today for work on her neck and upper back, because she is still noticing tension from being sick. Localizes pain to the base of her skull and neck. Low back has still been feeling ok. Tried activator adjustments today through neck and upper back and will follow up in one month.          Objective   Physical Exam  Musculoskeletal:      Cervical back: Tenderness present. Pain with movement present. Decreased range of motion.      Thoracic back: Tenderness present.      Lumbar back: Decreased range of motion.        Back:        Palpation of the following regions revealed:  Cervical: Suboccipitals bilateral, hypertonicity and tenderness.  Scalenes bilateral, muscular hypertonicity.  Upper trapezius bilateral, muscular hypertonicity.  Levator scapulae bilateral, muscular hypertonicity.  Cervical paraspinals bilateral, muscular hypertonicity.  Thoracic: Thoracic paraspinals bilateral, muscular hypertonicity.  Middle trapezius bilateral, muscular hypertonicity.  Lower trapezius bilateral, muscular hypertonicity.  Rhomboids bilateral, muscular hypertonicity.  Lumbar: Gluteal bilateral, muscular  hypertonicity.  Piriformis bilateral, muscular hypertonicity.    Segmental Joint(s): Segmental joint dysfunction was assessed with motion palpation and is identified in the following areas:  Thoracic : T2., T4, T5, and T6  Lumbopelvic / Sacral SIJ : L3, L4, L5/S1, R SIJ, and L SIJ    Assessment/Plan   Today's Treatment Included: Spinal manipulation to the following regions of segmental dysfunction identified on examination, using age-appropriate and injury specific force.  Segmental Joint(s) Thoracic : T2., T4, T5, and T6 Segmental Joint(s) Lumbopelvic/Sacral SIJ : L3, L4, L5/S1, R SIJ, and L SIJ  Chiropractic manipulation treatment techniques utilized: Pelvic drop table technique and Activator/Tool assisted technique  Soft tissue mobilization techniques utilized during treatment: Active Release Technique, Pin and Stretch, and Ischemic compression    Soft-tissue mobilization was performed in the following areas:  Suboccipital bilateral, Cervical paraspinal mm. bilateral, Scalenes bilateral, Upper Trapezius bilateral, and Levator Scap. bilateral  Thoracic Paraspinal mm. bilateral, Middle Trapezius bilateral, Lower Trapezius bilateral, and Rhomboids bilateral  Lumbar Paraspinal mm. bilateral, Quadratus Lumborum bilateral, Gluteal mm. Glute. Max.  and Glute. Med. bilateral, and Piriformis bilateral    Recommended follow-up in 1 month(s).     The patient tolerated today's treatment with little or no additional discomfort and was instructed to contact the office for questions or concerns.

## 2025-04-23 ENCOUNTER — ANESTHESIA EVENT (OUTPATIENT)
Dept: GASTROENTEROLOGY | Facility: EXTERNAL LOCATION | Age: 70
End: 2025-04-23

## 2025-04-23 ENCOUNTER — APPOINTMENT (OUTPATIENT)
Dept: PRIMARY CARE | Facility: CLINIC | Age: 70
End: 2025-04-23
Payer: MEDICARE

## 2025-04-23 DIAGNOSIS — E53.8 VITAMIN B12 DEFICIENCY: Primary | ICD-10-CM

## 2025-04-23 PROCEDURE — 96372 THER/PROPH/DIAG INJ SC/IM: CPT | Performed by: INTERNAL MEDICINE

## 2025-04-23 RX ADMIN — CYANOCOBALAMIN 1000 MCG: 1000 INJECTION, SOLUTION INTRAMUSCULAR; SUBCUTANEOUS at 09:41

## 2025-04-24 ENCOUNTER — APPOINTMENT (OUTPATIENT)
Dept: PRIMARY CARE | Facility: CLINIC | Age: 70
End: 2025-04-24
Payer: MEDICARE

## 2025-04-29 ENCOUNTER — APPOINTMENT (OUTPATIENT)
Dept: INTEGRATIVE MEDICINE | Facility: CLINIC | Age: 70
End: 2025-04-29
Payer: MEDICARE

## 2025-05-02 ENCOUNTER — APPOINTMENT (OUTPATIENT)
Dept: OTOLARYNGOLOGY | Facility: CLINIC | Age: 70
End: 2025-05-02
Payer: MEDICARE

## 2025-05-05 DIAGNOSIS — Z00.00 ROUTINE GENERAL MEDICAL EXAMINATION AT A HEALTH CARE FACILITY: ICD-10-CM

## 2025-05-05 RX ORDER — ALBUTEROL SULFATE 90 UG/1
1-2 INHALANT RESPIRATORY (INHALATION) EVERY 6 HOURS PRN
Qty: 18 G | Refills: 1 | Status: SHIPPED | OUTPATIENT
Start: 2025-05-05

## 2025-05-06 ENCOUNTER — APPOINTMENT (OUTPATIENT)
Dept: GASTROENTEROLOGY | Facility: EXTERNAL LOCATION | Age: 70
End: 2025-05-06
Payer: MEDICARE

## 2025-05-06 ENCOUNTER — ANESTHESIA (OUTPATIENT)
Dept: GASTROENTEROLOGY | Facility: EXTERNAL LOCATION | Age: 70
End: 2025-05-06

## 2025-05-06 VITALS
OXYGEN SATURATION: 98 % | RESPIRATION RATE: 12 BRPM | TEMPERATURE: 97.9 F | HEART RATE: 71 BPM | SYSTOLIC BLOOD PRESSURE: 114 MMHG | DIASTOLIC BLOOD PRESSURE: 68 MMHG

## 2025-05-06 DIAGNOSIS — R13.10 DYSPHAGIA, UNSPECIFIED TYPE: ICD-10-CM

## 2025-05-06 DIAGNOSIS — R12 HEARTBURN: ICD-10-CM

## 2025-05-06 DIAGNOSIS — K21.9 GASTROESOPHAGEAL REFLUX DISEASE, UNSPECIFIED WHETHER ESOPHAGITIS PRESENT: Primary | ICD-10-CM

## 2025-05-06 PROCEDURE — 43239 EGD BIOPSY SINGLE/MULTIPLE: CPT | Performed by: INTERNAL MEDICINE

## 2025-05-06 RX ORDER — PROPOFOL 10 MG/ML
INJECTION, EMULSION INTRAVENOUS AS NEEDED
Status: DISCONTINUED | OUTPATIENT
Start: 2025-05-06 | End: 2025-05-06

## 2025-05-06 RX ORDER — SODIUM CHLORIDE 9 MG/ML
INJECTION, SOLUTION INTRAVENOUS CONTINUOUS PRN
Status: DISCONTINUED | OUTPATIENT
Start: 2025-05-06 | End: 2025-05-06

## 2025-05-06 RX ORDER — LIDOCAINE HYDROCHLORIDE 20 MG/ML
INJECTION, SOLUTION INFILTRATION; PERINEURAL AS NEEDED
Status: DISCONTINUED | OUTPATIENT
Start: 2025-05-06 | End: 2025-05-06

## 2025-05-06 RX ADMIN — LIDOCAINE HYDROCHLORIDE 2 ML: 20 INJECTION, SOLUTION INFILTRATION; PERINEURAL at 11:08

## 2025-05-06 RX ADMIN — PROPOFOL 50 MG: 10 INJECTION, EMULSION INTRAVENOUS at 11:09

## 2025-05-06 RX ADMIN — SODIUM CHLORIDE: 9 INJECTION, SOLUTION INTRAVENOUS at 11:04

## 2025-05-06 RX ADMIN — PROPOFOL 20 MG: 10 INJECTION, EMULSION INTRAVENOUS at 11:11

## 2025-05-06 RX ADMIN — PROPOFOL 100 MG: 10 INJECTION, EMULSION INTRAVENOUS at 11:08

## 2025-05-06 RX ADMIN — PROPOFOL 30 MG: 10 INJECTION, EMULSION INTRAVENOUS at 11:13

## 2025-05-06 SDOH — HEALTH STABILITY: MENTAL HEALTH: CURRENT SMOKER: 0

## 2025-05-06 ASSESSMENT — COLUMBIA-SUICIDE SEVERITY RATING SCALE - C-SSRS
2. HAVE YOU ACTUALLY HAD ANY THOUGHTS OF KILLING YOURSELF?: NO
6. HAVE YOU EVER DONE ANYTHING, STARTED TO DO ANYTHING, OR PREPARED TO DO ANYTHING TO END YOUR LIFE?: NO
1. IN THE PAST MONTH, HAVE YOU WISHED YOU WERE DEAD OR WISHED YOU COULD GO TO SLEEP AND NOT WAKE UP?: NO

## 2025-05-06 ASSESSMENT — PAIN SCALES - GENERAL
PAINLEVEL_OUTOF10: 0 - NO PAIN
PAINLEVEL_OUTOF10: 0 - NO PAIN
PAIN_LEVEL: 0
PAINLEVEL_OUTOF10: 0 - NO PAIN

## 2025-05-06 ASSESSMENT — PAIN - FUNCTIONAL ASSESSMENT
PAIN_FUNCTIONAL_ASSESSMENT: 0-10

## 2025-05-06 NOTE — Clinical Note
Patient tolerated the procedure well and is comfortable with no complaints of pain. Vital signs stable. Arousable prior to transport. Patient transported to PACU via stretcher. Handoff completed.   Pt dilated with 18-20 Balloon No crepitus, SOB, CP

## 2025-05-06 NOTE — ANESTHESIA POSTPROCEDURE EVALUATION
Patient: Naomi Dunaway    Procedure Summary       Date: 05/06/25 Room / Location: [facility]    Anesthesia Start: 1104 Anesthesia Stop: 1120    Procedure: EGD Diagnosis:       Gastroesophageal reflux disease, unspecified whether esophagitis present      Heartburn      Dysphagia, unspecified type      Gastroesophageal reflux disease, unspecified whether esophagitis present    Scheduled Providers: Vidal Ignacio MD; April Hutchinson RN Responsible Provider: HIRAM Zarate    Anesthesia Type: MAC ASA Status: 2            Anesthesia Type: MAC    Vitals Value Taken Time   BP 97/40 05/06/25 11:18   Temp 36.6 °C (97.9 °F) 05/06/25 11:18   Pulse 70 05/06/25 11:18   Resp 18 05/06/25 11:18   SpO2 99 % 05/06/25 11:18       Anesthesia Post Evaluation    Patient location during evaluation: PACU  Patient participation: waiting for patient participation  Level of consciousness: responsive to verbal stimuli  Pain score: 0  Pain management: adequate  Airway patency: patent  Cardiovascular status: blood pressure returned to baseline  Respiratory status: acceptable  Hydration status: acceptable  Postoperative Nausea and Vomiting: none        No notable events documented.

## 2025-05-06 NOTE — H&P
Outpatient NR Procedure H&P    Patient Profile  Name Naomi Dunaway  Date of Birth 1955  MRN [id number]  PCP Sherly Soares        Diagnosis: gerd  Procedure(s):  EGD/     Allergies  RX Allergies[1]    Past Medical History   Medical History[2]    Medication Reviewed - yes  Prior to Admission medications    Medication Sig Start Date End Date Taking? Authorizing Provider   Advair Diskus 100-50 mcg/dose diskus inhaler INHALE 1 PUFF BY MOUTH TWICE A DAY (MORNING AND EVENING ABOUT 12 HOURS APART) 10/11/23  Yes Sherly Soares MD   albuterol 90 mcg/actuation inhaler Inhale 1-2 puffs every 6 hours if needed for shortness of breath. 5/5/25  Yes Sherly Soares MD   BIOTIN ORAL Take by mouth. daily   Yes Historical Provider, MD   cholecalciferol (Vitamin D-3) 25 MCG (1000 UT) capsule Take 1 capsule (25 mcg) by mouth.   Yes Historical Provider, MD   DULoxetine (Cymbalta) 20 mg DR capsule Take 1 capsule (20 mg) by mouth once daily. with duloxetine 60mg. Do not crush or chew. 3/13/25 9/9/25 Yes Vinicius Acharya MD   DULoxetine (Cymbalta) 60 mg DR capsule Take 1 capsule (60 mg) by mouth once daily. with duloxetine 20mg. Do not crush or chew. 3/13/25 9/9/25 Yes Vinicius Acharya MD   guaiFENesin (Mucinex) 1,200 mg tablet extended release 12hr Take 1 tablet (1,200 mg) by mouth every 12 hours.   Yes Historical Provider, MD   lamoTRIgine (LaMICtal) 150 mg tablet Take 1 tablet (150 mg) by mouth 2 times a day. 3/13/25 9/9/25 Yes Vinicius Acharya MD   LORazepam (Ativan) 0.5 mg tablet Take 1 tablet (0.5 mg) by mouth once daily as needed for anxiety. 3/13/25 5/6/25 Yes Vinicius Acharya MD   mometasone furoate, bulk, 100 % powder 2 mg by sinus irrigation route once daily. 4/15/24  Yes Alyssa Jones APRN-CNP   pantoprazole (Protonix) 40 mg EC tablet Take 1 tablet (40 mg) by mouth 2 times a day. Do not crush, chew, or split. 4/4/25  Yes GINO Tolbert-CNP   aluminum hydrox-magnesium carb (Gaviscon Extra Strength) 160-105  mg tablet,chewable Chew 2 tablets 3 times a day. After meals and at bedtime 1/20/25 1/20/26  Brendan De Jesus, APRN-CNP   amphetamine-dextroamphetamine (Adderall) 10 mg tablet Take by mouth. 6/16/21   Historical Provider, MD   DULoxetine 40 mg DR capsule Take 2 capsules (80 mg) by mouth once daily for 3 days.  Patient not taking: Reported on 4/4/2025 3/9/25 3/12/25  Vinicius Acharya MD   scopolamine (Transderm-Scop) 1 mg over 3 days patch 3 day Place 1 patch on the skin every 3rd day for 15 days. 4/21/25 5/6/25  Sherly Soares MD   albuterol 90 mcg/actuation inhaler Inhale 1-2 puffs every 6 hours if needed for shortness of breath. 2/19/25 5/5/25  Sherly Soares MD       Physical Exam  There were no vitals filed for this visit.   Weight There were no vitals filed for this visit.  BMI There is no height or weight on file to calculate BMI.    General: A&Ox3, NAD.  HEENT: AT/NC.   CV: RRR. No murmur.  Resp: CTA bilaterally. No wheezing, rhonchi or rales.   GI: Soft, NT/ND. BSx4.  Extrem: No edema. Pulses intact.  Skin: No Jaundice.   Neuro: No focal deficits.   Psych: Normal mood and affect.        Oropharyngeal Classification I (soft palate, uvula, fauces, and tonsillar pillars visible)  ASA PS Classification 2  Sedation Plan: Deep Sedation.  Procedure Plan - pre-procedural (re)assesment completed by physician:  discharge/transfer patient when discharge criteria met    Vidal Ignacio MD  5/6/2025 10:53 AM          [1]   Allergies  Allergen Reactions    Feathers Unknown    House Dust Unknown    Mold Unknown    Sulfamethoxazole Rash   [2]   Past Medical History:  Diagnosis Date    Acute upper respiratory infection, unspecified 10/24/2018    Acute URI    Anxiety     Asthma     Depression     Encounter for screening for human papillomavirus (HPV) 03/18/2014    Screening for human papillomavirus    Encounter for screening for malignant neoplasm of colon 03/18/2014    Colon cancer screening    Fasciculation 01/16/2015     Twitching    Other conditions influencing health status 03/18/2014    History of dyspareunia    Pain in left foot 08/20/2020    Left foot pain    Pain in right knee 09/05/2018    Right knee pain, unspecified chronicity    Personal history of other diseases of the respiratory system     History of sinus problem    Personal history of other specified conditions 11/24/2014    History of dyspnea    Personal history of other specified conditions 01/16/2015    History of weakness    PONV (postoperative nausea and vomiting)     Sprain of other ligament of left ankle, initial encounter 06/04/2020    Sprain of anterior talofibular ligament of left ankle, initial encounter    Unspecified sprain of left foot, initial encounter 06/04/2020    Sprain of left foot, initial encounter    Vaginal atrophy 01/20/2023

## 2025-05-06 NOTE — DISCHARGE INSTRUCTIONS
Patient Instructions Post Procedure      The anesthetics, sedatives or narcotics which were given to you today will be acting in your body for the next 24 hours, so you might feel a little sleepy or groggy.  This feeling should slowly wear off. Carefully read and follow the instructions.     You received sedation today:  - Do not drive or operate any machinery or power tools of any kind.   - No alcoholic beverages today, not even beer or wine.  - Do not make any important decisions or sign any legal documents.  - No over the counter medications that contain alcohol or that may cause drowsiness.    While it is common to experience mild to moderate abdominal distention, gas, or belching after your procedure, if any of these symptoms occur following discharge from the GI Lab or within one week of having your procedure, call the [facility] to be advised whether a visit to your nearest Urgent Care or Emergency Department is indicated.  Take this paper with you if you go.   - If you develop an allergic reaction to the medications that were given during your procedure such as difficulty breathing, rash, hives, severe nausea, vomiting or lightheadedness.  - If you experience chest pain, shortness of breath, severe abdominal pain, fevers and chills.  -If you develop signs and symptoms of bleeding such as blood in your spit, if your stools turn black, tarry, or bloody  - If you have not urinated within 8 hours following your procedure.  - If your IV site becomes painful, red, inflamed, or looks infected.    If you received a biopsy/polypectomy/sphincterotomy the following instructions apply below:  __ Do not use Aspirin containing products, non-steroidal medications or anti-coagulants for one week following your procedure. (Examples of these types of medications are: Advil, Arthrotec, Aleve, Coumadin, Ecotrin, Heparin, Ibuprofen, Indocin, Motrin, Naprosyn, Nuprin, Plavix, Vioxx, and Voltarin, or their generic  forms.  This list is not all-inclusive.  Check with your physician or pharmacist before resuming medications.)   __ Eat a soft diet today.  Avoid foods that are poorly digested for the next 24 hours.  These foods would include: nuts, beans, lettuce, red meats, and fried foods. Start with liquids and advance your diet as tolerated, gradually work up to eating solids.   __ Do not have a Barium Study or Enema for one week.    Your physician recommends the additional following instructions:    -You have a contact number available for emergencies. The signs and symptoms of potential delayed complications were discussed with you. You may return to normal activities tomorrow.  -Resume your previous diet or other if specified.  -Continue your present medications.   -We are waiting for your pathology results, if applicable.  -The findings and recommendations have been discussed with you and/or family.  - Please see Medication Reconciliation Form for new medication/medications prescribed.     If you experience any problems or have any questions following discharge from the GI Lab, please call: [phone] from 7 am- 4:30 pm.  In the event of an emergency please go to the closest Emergency Department or call  [phone]

## 2025-05-06 NOTE — ANESTHESIA PREPROCEDURE EVALUATION
Patient: Naomi Dunaway    Procedure Information       Date/Time: 25 1000    Scheduled providers: Vidal Ignacio MD; April Hutchinson RN    Procedure: EGD    Location: [facility]            Relevant Problems   Cardiac   (+) Hyperlipidemia      Pulmonary   (+) Asthma      Neuro   (+) Anxiety   (+) Major depressive disorder, recurrent severe without psychotic features (Multi)   (+) Recurrent major depressive disorder, in full remission      GI   (+) GERD (gastroesophageal reflux disease)      Musculoskeletal   (+) Primary osteoarthritis of right knee   (+) Right knee DJD   (+) Spondylosis of cervical region without myelopathy or radiculopathy      HEENT   (+) Chronic ethmoidal sinusitis   (+) Sensorineural hearing loss (SNHL)      Skin   (+) Malignant neoplasm of skin       Clinical information reviewed:   Tobacco  Allergies  Meds  Problems  Med Hx  Surg Hx  OB Status    Fam Hx  Soc Hx        NPO Detail:  NPO/Void Status  Date of Last Liquid: 25  Time of Last Liquid: 430  Date of Last Solid: 25  Time of Last Solid:   Last Intake Type: Clear fluids         Physical Exam    Airway  Mallampati: II  TM distance: <3 FB  Neck ROM: limited  Mouth openin finger widths     Cardiovascular - normal exam   Dental - normal exam     Pulmonary - normal exam   Abdominal - normal exam           Anesthesia Plan    History of general anesthesia?: yes  History of complications of general anesthesia?: no    ASA 2     MAC     The patient is not a current smoker.  Patient was previously instructed to abstain from smoking on day of procedure.  Patient did not smoke on day of procedure.    intravenous induction   Anesthetic plan and risks discussed with patient.

## 2025-05-14 LAB
LABORATORY COMMENT REPORT: NORMAL
PATH REPORT.FINAL DX SPEC: NORMAL
PATH REPORT.GROSS SPEC: NORMAL
PATH REPORT.RELEVANT HX SPEC: NORMAL
PATH REPORT.TOTAL CANCER: NORMAL

## 2025-05-19 NOTE — PROGRESS NOTES
Naomi Dunaway is a 70 y.o. female with past medical history of asthma, vitamin B12 deficiency, depression, and IBS-C who presents today for follow up of GERD. Seen 4/2025 for several year history of reflux, heartburn, frequent cough. Did not tolerate Nexium. Started pantoprazole about a year ago, with initial improvement.      1/2025 Pantoprazole no longer working. Daily heartburn, throat discomfort, and intermittent dysphagia. No anemia. No NSAIDS. Remote EGD in her 20s and told hiatal hernia.     5/2025 EGD with 2 cm hiatal hernia. Benign-appearing stricture; dilated. Non-obstructing Schatzkis ring. Dilation did little to ring. Small typical Barretts mucosa just above z line. Path with intestinal metaplasia. No dysplasia. Stomach and duodenum normal.    Presents today for follow up. Increased pantoprazole to 40 mg BID with some improvement. Heartburn not as frequent, but continues to have throat burning. Worse with certain things like tomatoes, coffee. No longer having dysphagia with food but occasionally has trouble with large pills. Constipation somewhat improved. On Linzess in the past but no longer needing this. However bloating persists. She is scheduled for SIBO breath test.     Social history: Prior tobacco over 30 years ago. She has about 2 alcoholic drinks per week. Denies illicit drug use.     Family history: Father had esophageal stricture. Denies family history of colon cancer or other GI disorders or malignancy.    Current Medications[1]    Review of Systems  Review of Systems negative except as noted in HPI.    Objective     /72   Pulse 76   Temp 36.6 °C (97.9 °F)   Wt 48.3 kg (106 lb 6.4 oz)   SpO2 98%   BMI 19.78 kg/m²      Physical Exam  Constitutional:  No acute distress. Normal appearance. Not ill-appearing.  HENT:  Head normocephalic and atraumatic. Conjunctivae normal.  Cardiovascular:  Normal rate. Regular rhythm.  Pulmonary:  Pulmonary effort normal. No respiratory distress.  Breath sounds clear.  Abdominal:  Abdomen is flat and soft. There is no distension. No tenderness or guarding.  Skin: Dry.  Neurological:  Alert and oriented.  Psychiatric:  Mood and affect normal.    Assessment/Plan     70 y.o. female with history of asthma, vitamin B12 deficiency, depression, and IBS-C who presents today for clinic follow up of chronic heartburn and burning reflux, now with intermittent dysphagia. Recent EGD with esophageal stricture s/p dilation, non-obstructing Schatzkis ring, small typical Barretts mucosa. Path with intestinal metaplasia. Stomach and duodenum normal. We discussed procedure findings. We will continue twice daily PPI for now. Hopefully we can decrease dose in the future.      History colon polyps - Colonoscopy 2021 with 1 tubular adenoma. Repeat 5 years.     Recommendations  Continue pantoprazole to 40 mg twice daily. Take on empty stomach.  Continue Gaviscon as needed.  Obtain breath test as scheduled due to your significant bloating.  Follow up in 3 months. Anticipate repeat EGD 1 year.    Electronically signed by: Tawanna Rodrigues CNP on 5/21/2025 at 9:31 AM             [1]   Current Outpatient Medications   Medication Sig Dispense Refill    Advair Diskus 100-50 mcg/dose diskus inhaler INHALE 1 PUFF BY MOUTH TWICE A DAY (MORNING AND EVENING ABOUT 12 HOURS APART) 60 each 2    albuterol 90 mcg/actuation inhaler Inhale 1-2 puffs every 6 hours if needed for shortness of breath. 18 g 1    aluminum hydrox-magnesium carb (Gaviscon Extra Strength) 160-105 mg tablet,chewable Chew 2 tablets 3 times a day. After meals and at bedtime 240 tablet 2    amphetamine-dextroamphetamine (Adderall) 10 mg tablet Take by mouth.      BIOTIN ORAL Take by mouth. daily      cholecalciferol (Vitamin D-3) 25 MCG (1000 UT) capsule Take 1 capsule (25 mcg) by mouth.      DULoxetine (Cymbalta) 20 mg DR capsule Take 1 capsule (20 mg) by mouth once daily. with duloxetine 60mg. Do not crush or chew. 90 capsule 1     DULoxetine (Cymbalta) 60 mg DR capsule Take 1 capsule (60 mg) by mouth once daily. with duloxetine 20mg. Do not crush or chew. 90 capsule 1    guaiFENesin (Mucinex) 1,200 mg tablet extended release 12hr Take 1 tablet (1,200 mg) by mouth every 12 hours.      lamoTRIgine (LaMICtal) 150 mg tablet Take 1 tablet (150 mg) by mouth 2 times a day. 180 tablet 1    LORazepam (Ativan) 0.5 mg tablet Take 1 tablet (0.5 mg) by mouth once daily as needed for anxiety. 30 tablet 0    mometasone furoate, bulk, 100 % powder 2 mg by sinus irrigation route once daily. 60 g 11    pantoprazole (Protonix) 40 mg EC tablet Take 1 tablet (40 mg) by mouth 2 times a day. Do not crush, chew, or split. 180 tablet 3     Current Facility-Administered Medications   Medication Dose Route Frequency Provider Last Rate Last Admin    cyanocobalamin (Vitamin B-12) injection 1,000 mcg  1,000 mcg intramuscular q7 days Sherly Soares MD   1,000 mcg at 04/23/25 0941    cyanocobalamin (Vitamin B-12) injection 1,000 mcg  1,000 mcg intramuscular q14 days Sherly Soares MD   1,000 mcg at 10/31/23 1353    cyanocobalamin (Vitamin B-12) injection 1,000 mcg  1,000 mcg intramuscular q14 days Sherly Soares MD   1,000 mcg at 03/26/25 1053

## 2025-05-20 ENCOUNTER — APPOINTMENT (OUTPATIENT)
Dept: GASTROENTEROLOGY | Facility: HOSPITAL | Age: 70
End: 2025-05-20
Payer: MEDICARE

## 2025-05-20 VITALS
DIASTOLIC BLOOD PRESSURE: 72 MMHG | SYSTOLIC BLOOD PRESSURE: 131 MMHG | HEART RATE: 76 BPM | WEIGHT: 106.4 LBS | TEMPERATURE: 97.9 F | OXYGEN SATURATION: 98 % | BODY MASS INDEX: 19.78 KG/M2

## 2025-05-20 DIAGNOSIS — K22.2 ESOPHAGEAL STRICTURE: ICD-10-CM

## 2025-05-20 DIAGNOSIS — K31.A0 INTESTINAL METAPLASIA OF STOMACH: ICD-10-CM

## 2025-05-20 DIAGNOSIS — R13.10 DYSPHAGIA, UNSPECIFIED TYPE: ICD-10-CM

## 2025-05-20 DIAGNOSIS — R12 HEARTBURN: Primary | ICD-10-CM

## 2025-05-20 PROCEDURE — 99214 OFFICE O/P EST MOD 30 MIN: CPT | Performed by: NURSE PRACTITIONER

## 2025-05-20 PROCEDURE — 1126F AMNT PAIN NOTED NONE PRSNT: CPT | Performed by: NURSE PRACTITIONER

## 2025-05-20 PROCEDURE — G2211 COMPLEX E/M VISIT ADD ON: HCPCS | Performed by: NURSE PRACTITIONER

## 2025-05-20 SDOH — ECONOMIC STABILITY: FOOD INSECURITY: WITHIN THE PAST 12 MONTHS, THE FOOD YOU BOUGHT JUST DIDN'T LAST AND YOU DIDN'T HAVE MONEY TO GET MORE.: NEVER TRUE

## 2025-05-20 SDOH — ECONOMIC STABILITY: FOOD INSECURITY: WITHIN THE PAST 12 MONTHS, YOU WORRIED THAT YOUR FOOD WOULD RUN OUT BEFORE YOU GOT MONEY TO BUY MORE.: NEVER TRUE

## 2025-05-20 ASSESSMENT — PAIN SCALES - GENERAL: PAINLEVEL_OUTOF10: 0-NO PAIN

## 2025-05-20 ASSESSMENT — LIFESTYLE VARIABLES
HOW OFTEN DO YOU HAVE A DRINK CONTAINING ALCOHOL: MONTHLY OR LESS
HOW MANY STANDARD DRINKS CONTAINING ALCOHOL DO YOU HAVE ON A TYPICAL DAY: 1 OR 2
SKIP TO QUESTIONS 9-10: 1
AUDIT-C TOTAL SCORE: 1
HOW OFTEN DO YOU HAVE SIX OR MORE DRINKS ON ONE OCCASION: NEVER

## 2025-05-20 ASSESSMENT — PATIENT HEALTH QUESTIONNAIRE - PHQ9
SUM OF ALL RESPONSES TO PHQ9 QUESTIONS 1 & 2: 0
2. FEELING DOWN, DEPRESSED OR HOPELESS: NOT AT ALL
1. LITTLE INTEREST OR PLEASURE IN DOING THINGS: NOT AT ALL

## 2025-05-20 NOTE — PATIENT INSTRUCTIONS
Your EGD showed esophageal stricture that was dilated. You also have a Schatzki's ring which is a ring of tissue in the lower esophagus that was not causing significant obstruction. The biopsy showed intestinal metaplasia. We will continue ant-acid medication indefinitely and I recommend a repeat EGD in about 1 year to follow up on this.     Recommendations  Continue pantoprazole to 40 mg twice daily. Take on empty stomach.  Continue Gaviscon as needed.  Obtain breath test as scheduled due to your significant bloating.  Follow up in 3 months.

## 2025-05-29 ENCOUNTER — OFFICE VISIT (OUTPATIENT)
Dept: GASTROENTEROLOGY | Facility: CLINIC | Age: 70
End: 2025-05-29
Payer: MEDICARE

## 2025-05-29 VITALS
BODY MASS INDEX: 19.51 KG/M2 | SYSTOLIC BLOOD PRESSURE: 111 MMHG | HEIGHT: 62 IN | WEIGHT: 106 LBS | HEART RATE: 76 BPM | DIASTOLIC BLOOD PRESSURE: 69 MMHG | TEMPERATURE: 97.5 F

## 2025-05-29 DIAGNOSIS — R14.0 BLOATING: ICD-10-CM

## 2025-05-29 PROCEDURE — 3008F BODY MASS INDEX DOCD: CPT | Performed by: NURSE PRACTITIONER

## 2025-05-29 NOTE — PROGRESS NOTES
Patient ID: Naomi Dunaway is a 70 y.o. female.    Breath Hydrogen Test-Dextrose    Date/Time: 5/29/2025 11:06 AM    Performed by: Jessy Taylor MA  Authorized by: YAEL Tolbert    Consent:     Consent obtained:  Verbal    Consent given by:  Patient  Universal protocol:     Procedure explained and questions answered to patient or proxy's satisfaction: yes      Patient identity confirmed:  Verbally with patient  Procedure specific details:      Patient had increased fatigue and sleepiness after substrate was given.  Post-procedure details:     Procedure completion:  Tolerated  Hydrogen Breath Analysis Consultation Sheet    Referring Provider: YAEL Tolbert  [address]  [facility] [place] [address]    Indication: Rule out SIBO    Symptoms: bloating    Age: 70 y.o.  Weight:   Vitals:    05/29/25 1105   Weight: 48.1 kg (106 lb)     Substrate: Dextrose   Dose: 75 gram    Last Meal: white rice, broth and white bread at 8 pm  Recent Antibiotics: patient denies    RESULTS:   Time PPM (H2) APPM* (CH4) CO2 Correction   Baseline #1 0838 5 8 4.6 1.19   Baseline #2 0840 6 8 4.6 1.19   *Challenge Dose Sugar: 0845 75 gram Dextrose  15' 0900 5 8 4.6 1.19   30' 0915 5 8 4.4 1.25   45' 0930 2 7 4.8 1.14   60' 0945 4 8 4.4 1.25   75' 1000 2 7 4.7 1.17   90' 1015 3 8 4.1 1.34   105' 1030 2 7 4.7 1.17   120' 1045 2 7 4.9 1.12   135'        150'        165'        180'          Impression: Negative for SIBO and methane

## 2025-05-29 NOTE — LETTER
May 29, 2025     YAEL Tolbert  [address]  Department Of Medicine-Gastroenterology  [facility] [address]    Patient: Naomi Dunaway   YOB: 1955   Date of Visit: 5/29/2025       Dear YAEL Rey:    Thank you for referring Naomi Dunaway for a hydrogen breath test. The breath test was negative for SIBO.        Sincerely,     YAEL Campbell      CC: No Recipients  ______________________________________________________________________________________

## 2025-06-04 ENCOUNTER — CLINICAL SUPPORT (OUTPATIENT)
Dept: PRIMARY CARE | Facility: CLINIC | Age: 70
End: 2025-06-04
Payer: MEDICARE

## 2025-06-04 PROCEDURE — 96372 THER/PROPH/DIAG INJ SC/IM: CPT | Performed by: INTERNAL MEDICINE

## 2025-06-04 RX ADMIN — CYANOCOBALAMIN 1000 MCG: 1000 INJECTION, SOLUTION INTRAMUSCULAR; SUBCUTANEOUS at 14:57

## 2025-06-26 ENCOUNTER — APPOINTMENT (OUTPATIENT)
Dept: BEHAVIORAL HEALTH | Facility: CLINIC | Age: 70
End: 2025-06-26
Payer: MEDICARE

## 2025-06-26 DIAGNOSIS — F33.42 RECURRENT MAJOR DEPRESSIVE DISORDER, IN FULL REMISSION: Primary | ICD-10-CM

## 2025-06-26 DIAGNOSIS — F41.9 ANXIETY: ICD-10-CM

## 2025-06-26 LAB
AMPHETAMINES UR QL: NEGATIVE NG/ML
BARBITURATES UR QL: NEGATIVE NG/ML
BENZODIAZ UR QL: NEGATIVE NG/ML
BZE UR QL: NEGATIVE NG/ML
CREAT UR-MCNC: 26 MG/DL
METHADONE UR QL: NEGATIVE NG/ML
OPIATES UR QL: NEGATIVE NG/ML
OXIDANTS UR QL: NEGATIVE MCG/ML
OXYCODONE UR QL: NEGATIVE NG/ML
PCP UR QL: NEGATIVE NG/ML
PH UR: 6.8 [PH] (ref 4.5–9)
QUEST NOTES AND COMMENTS: NORMAL
THC UR QL: NEGATIVE NG/ML

## 2025-06-26 PROCEDURE — 99214 OFFICE O/P EST MOD 30 MIN: CPT | Performed by: PSYCHIATRY & NEUROLOGY

## 2025-06-26 PROCEDURE — 1160F RVW MEDS BY RX/DR IN RCRD: CPT | Performed by: PSYCHIATRY & NEUROLOGY

## 2025-06-26 PROCEDURE — 1159F MED LIST DOCD IN RCRD: CPT | Performed by: PSYCHIATRY & NEUROLOGY

## 2025-06-26 RX ORDER — LEVOMEFOLATE/ALGAL OIL 15-90.314
1 CAPSULE ORAL DAILY
Qty: 90 CAPSULE | Refills: 1 | Status: SHIPPED | OUTPATIENT
Start: 2025-06-26 | End: 2025-12-23

## 2025-06-26 ASSESSMENT — PATIENT HEALTH QUESTIONNAIRE - PHQ9
1. LITTLE INTEREST OR PLEASURE IN DOING THINGS: SEVERAL DAYS
2. FEELING DOWN, DEPRESSED OR HOPELESS: SEVERAL DAYS
SUM OF ALL RESPONSES TO PHQ9 QUESTIONS 1 AND 2: 2

## 2025-06-26 NOTE — PROGRESS NOTES
Adult Ambulatory Psychiatry Progress Note    Pt is at home ([address]  [facility] [address] )  Writer is at home office      Assessment/Plan     Impression:  Naomi Dunaway is a 70 y.o. female domiciled with , retired who presents for follow up with [facility] Depression [facility] Anxiety.    Plan:        Depression/anxiety - duloxetine 80mg daily, lamotrigine 150mg BID, lorazepam 0.5mg TID prn panic, c/w therapy, c/w med ed, psycho ed and supportive psychotherapy, f/u 3 months         Subjective   HPI:  Pt arrived on time. Mood down since last session. She was dx w/ an aneurysm. She was told she can't do TMS anymore. She's had some stress because her son has had some legal trouble. Denies significant depressed mood. Anxiety is manageable. She notices an edge to her mood at times without the fluoxetine but all her tremors and myoclonus went away. Sleeping ok. Appetite ok. Taking medicines as prescribed.       OARRS:  Vinicius Acharya MD on 6/26/2025 11:08 AM  I have personally reviewed the OARRS report for Naomi Dunaway. I have considered the risks of abuse, dependence, addiction and diversion    Is the patient prescribed a combination of a benzodiazepine and opioid?  No    Last Urine Drug Screen / ordered today: No  No results found for this or any previous visit (from the past 8760 hours).  DRUG SCREEN, URINE WITH REFLEX TO CONFIRMATION  Order: [id number]   Status: Final result    Test Result Released: Yes (seen)    0 Result Notes      Component  Ref Range & Units 1 d ago   Amphetamines  <500 ng/mL NEGATIVE   Barbiturates  <300 ng/mL NEGATIVE   Benzodiazepines  <100 ng/mL NEGATIVE   Cocaine Metabolite  <150 ng/mL NEGATIVE   Marijuana Metabolite  <20 ng/mL NEGATIVE   Methadone Metabolite  <100 ng/mL NEGATIVE   Opiates  <100 ng/mL NEGATIVE   Oxycodone  <100 ng/mL NEGATIVE   Phencyclidine  <25 ng/mL NEGATIVE   Creatinine  > or = 20.0 mg/dL 26.0   pH  4.5 - 9.0 6.8   Oxidant  <200 mcg/mL NEGATIVE   Notes and  Comments    Comment: This drug testing is for medical treatment only.  Analysis was performed as non-forensic testing and  these results should be used only by healthcare  providers to render diagnosis or treatment, or to  monitor progress of medical conditions.        Healthcare Providers needing Interpretation assistance,  please contact us at [phone] ([phone])  M-F, 8am to 10pm EST   Resulting Agency [facility]             Specimen Collected: 06/25/25 14:28 Last Resulted: 06/26/25 05:06     Results are as expected.       Controlled Substance Agreement:  Date of the Last Agreement: 6/26/25  Reviewed Controlled Substance Agreement including but not limited to the benefits, risks, and alternatives to treatment with a Controlled Substance medication(s).        Objective   Mental Status Exam:  General Appearance: Well groomed, appropriate eye contact  Attitude/Behavior: Cooperative  Motor: No psychomotor agitation or retardation, no tremor or other abnormal movements  Speech: Normal rate, volume, prosody  Mood: down  Affect: Constricted  Thought Process: Linear, goal directed  Thought Associations: No loosening of associations  Thought Content: Normal  Perception: No perceptual abnormalities noted  Insight: Intact  Judgement: Intact    Vitals:  There were no vitals filed for this visit.    Current Medications:  Current Outpatient Medications on File Prior to Visit   Medication Sig Dispense Refill    Advair Diskus 100-50 mcg/dose diskus inhaler INHALE 1 PUFF BY MOUTH TWICE A DAY (MORNING AND EVENING ABOUT 12 HOURS APART) 60 each 2    albuterol 90 mcg/actuation inhaler Inhale 1-2 puffs every 6 hours if needed for shortness of breath. 18 g 1    aluminum hydrox-magnesium carb (Gaviscon Extra Strength) 160-105 mg tablet,chewable Chew 2 tablets 3 times a day. After meals and at bedtime 240 tablet 2    amphetamine-dextroamphetamine (Adderall) 10 mg tablet Take by mouth.       BIOTIN ORAL Take by mouth. daily      cholecalciferol (Vitamin D-3) 25 MCG (1000 UT) capsule Take 1 capsule (25 mcg) by mouth.      DULoxetine (Cymbalta) 20 mg DR capsule Take 1 capsule (20 mg) by mouth once daily. with duloxetine 60mg. Do not crush or chew. 90 capsule 1    DULoxetine (Cymbalta) 60 mg DR capsule Take 1 capsule (60 mg) by mouth once daily. with duloxetine 20mg. Do not crush or chew. 90 capsule 1    guaiFENesin (Mucinex) 1,200 mg tablet extended release 12hr Take 1 tablet (1,200 mg) by mouth every 12 hours.      lamoTRIgine (LaMICtal) 150 mg tablet Take 1 tablet (150 mg) by mouth 2 times a day. 180 tablet 1    LORazepam (Ativan) 0.5 mg tablet Take 1 tablet (0.5 mg) by mouth once daily as needed for anxiety. 30 tablet 0    mometasone furoate, bulk, 100 % powder 2 mg by sinus irrigation route once daily. 60 g 11    pantoprazole (Protonix) 40 mg EC tablet Take 1 tablet (40 mg) by mouth 2 times a day. Do not crush, chew, or split. 180 tablet 3     Current Facility-Administered Medications on File Prior to Visit   Medication Dose Route Frequency Provider Last Rate Last Admin    cyanocobalamin (Vitamin B-12) injection 1,000 mcg  1,000 mcg intramuscular q7 days Sherly Soares MD   1,000 mcg at 06/04/25 1457    cyanocobalamin (Vitamin B-12) injection 1,000 mcg  1,000 mcg intramuscular q14 days Sherly Soares MD   1,000 mcg at 10/31/23 1353    cyanocobalamin (Vitamin B-12) injection 1,000 mcg  1,000 mcg intramuscular q14 days Sherly Soares MD   1,000 mcg at 03/26/25 1053       Orders:  Diagnoses and all orders for this visit:  Recurrent major depressive disorder, in full remission  -     Depchristiane sim oil, 15-90.314 mg capsule; Take 1 capsule by mouth once daily.  -     Follow Up In Psychiatry; Future  Anxiety        PHQ9  Over the past 2 weeks, how often have you been bothered by any of the following problems?  Little interest or pleasure in doing things: Several days  Feeling down, depressed, or  hopeless: Several days      Next Appointment:  Follow up in about 3 months (around 9/26/2025).

## 2025-07-06 NOTE — PROGRESS NOTES
Subjective   Patient ID:   [id number]   Naomi Dunaway is a 70 y.o. female who presents for Asthma (Follow up, ACT 20).    Chief Complaint   Patient presents with    Asthma     Follow up, ACT 20      Patient presents for 6-month F/U of asthma, GERD, cough, allergic rhinitis.    Since last visit, 1-3-25, patient states on 1-17-25 she saw Brendan De Jesus CNP, who performed flexible laryngoscopy with stroboscopy notable for start of mid cord striking point nodes bilaterally, bilateral VC edema, small right vallecular cyst and smaller left base of tongue mucus retention cyst.  Brendan recommended voice therapy, continue Nexium and add on Gaviscon.  She states she is S/P vocal cord surgery in her 20s for nodes.  She states Brendan told her the nodes are starting to build up.  She is concerned because she does not want to repeat surgery.  Brendan asked her to F/U in 2-3 months for recheck but appointments on 4-18-25 and 5-2-25 were canceled by the office.  Her cough has improved as is her reflux.    She states her reflux has improved with pantoprazole 40 mg.  She did not have improvement with Nexium.    Asthma is controlled and she will use her inhaler prior to activity.  She uses it about once a week as a rescue and Advair BID.  She states her insurance does not cover her Advair.  She denies any illnesses or infections that last 6 months.      Patient had a brain study at [facility] as part of a clinical research program she participates in.  She was found to have a small aneurysm and scheduled for F/U.    She saw GI and had an EGD, which showed Magallanes esophagus.  She has surveillance EGD yearly.      Objective   Pulse 68   Wt 48.1 kg (106 lb)   SpO2 95%   BMI 19.70 kg/m²      Physical Exam  Constitutional:       Appearance: Normal appearance.   HENT:      Head: Normocephalic and atraumatic.      Right Ear: External ear normal. There is no impacted cerumen.      Left Ear: External ear normal. There is no impacted cerumen.      Nose:  No congestion or rhinorrhea.   Eyes:      Extraocular Movements: Extraocular movements intact.      Conjunctiva/sclera: Conjunctivae normal.      Pupils: Pupils are equal, round, and reactive to light.   Cardiovascular:      Rate and Rhythm: Normal rate and regular rhythm.      Heart sounds: No murmur heard.     No friction rub. No gallop.   Pulmonary:      Effort: No respiratory distress.      Breath sounds: No wheezing, rhonchi or rales.   Skin:     General: Skin is warm and dry.   Neurological:      Mental Status: She is alert.   Psychiatric:         Mood and Affect: Mood normal.         Behavior: Behavior normal.     Assessment/Plan     Asthma  ACT 20.    Change Advair to Symbicort 2 inhalations in the morning and increase to twice a day for flares as needed. Making the change because the Advair is not covered by her insurance.    Continue albuterol as needed.    GERD (gastroesophageal reflux disease)  Reflux has improved on pantoprazole 40 mg.  She feels this has helped her asthma Sx.  She switched because Nexium was ineffective for her.    She will continue her pantoprazole daily.    Chronic cough  Currently the cough is better, but she has known VC problems. She should return to ENT as recommended    By signing my name below, I, Melva Snow, attest that this documentation has been prepared under the direction and in the presence of Tosha Salomon MD.  All medical record entries made by the Scribe were at my direction and personally dictated by me. I have reviewed the chart and agree that the record accurately reflects my personal performance of the history, physical exam, discussion and plan.

## 2025-07-06 NOTE — PATIENT INSTRUCTIONS
Continue pantoprazole twice a day.    Change Advair to Symbicort 2 inhalations in the morning and increase to twice a day for flares as needed.  Be sure to rinse mouth and brush teeth after every use.    Continue albuterol as needed.    Follow up in 6 months or sooner if problems worsen prior.

## 2025-07-07 ENCOUNTER — APPOINTMENT (OUTPATIENT)
Dept: ALLERGY | Facility: CLINIC | Age: 70
End: 2025-07-07
Payer: MEDICARE

## 2025-07-07 VITALS — WEIGHT: 106 LBS | BODY MASS INDEX: 19.7 KG/M2 | OXYGEN SATURATION: 95 % | HEART RATE: 68 BPM

## 2025-07-07 DIAGNOSIS — J45.909 SEVERE ASTHMA WITHOUT COMPLICATION, UNSPECIFIED WHETHER PERSISTENT (HHS-HCC): Primary | ICD-10-CM

## 2025-07-07 DIAGNOSIS — K21.9 GASTROESOPHAGEAL REFLUX DISEASE, UNSPECIFIED WHETHER ESOPHAGITIS PRESENT: ICD-10-CM

## 2025-07-07 DIAGNOSIS — J45.20 INTERMITTENT ASTHMA, UNSPECIFIED ASTHMA SEVERITY, UNSPECIFIED WHETHER COMPLICATED (HHS-HCC): ICD-10-CM

## 2025-07-07 DIAGNOSIS — J45.30 MILD PERSISTENT ASTHMA WITHOUT COMPLICATION (HHS-HCC): ICD-10-CM

## 2025-07-07 DIAGNOSIS — R05.3 CHRONIC COUGH: ICD-10-CM

## 2025-07-07 PROCEDURE — 96160 PT-FOCUSED HLTH RISK ASSMT: CPT | Performed by: ALLERGY & IMMUNOLOGY

## 2025-07-07 PROCEDURE — 99214 OFFICE O/P EST MOD 30 MIN: CPT | Performed by: ALLERGY & IMMUNOLOGY

## 2025-07-07 PROCEDURE — 1160F RVW MEDS BY RX/DR IN RCRD: CPT | Performed by: ALLERGY & IMMUNOLOGY

## 2025-07-07 PROCEDURE — 1159F MED LIST DOCD IN RCRD: CPT | Performed by: ALLERGY & IMMUNOLOGY

## 2025-07-07 RX ORDER — BUDESONIDE AND FORMOTEROL FUMARATE DIHYDRATE 80; 4.5 UG/1; UG/1
2 AEROSOL RESPIRATORY (INHALATION)
Qty: 10.2 G | Refills: 11 | Status: SHIPPED | OUTPATIENT
Start: 2025-07-07 | End: 2026-07-07

## 2025-07-07 NOTE — ASSESSMENT & PLAN NOTE
Reflux has improved on pantoprazole 40 mg.  She feels this has helped her asthma Sx.  She switched because Nexium was ineffective for her.    She will continue her pantoprazole daily.

## 2025-07-07 NOTE — ASSESSMENT & PLAN NOTE
ACT 20.    Change Advair to Symbicort 2 inhalations in the morning and increase to twice a day for flares as needed. Making the change because the Advair is not covered by her insurance.    Continue albuterol as needed.

## 2025-07-09 ENCOUNTER — APPOINTMENT (OUTPATIENT)
Dept: PRIMARY CARE | Facility: CLINIC | Age: 70
End: 2025-07-09
Payer: MEDICARE

## 2025-07-09 DIAGNOSIS — E53.8 VITAMIN B12 DEFICIENCY: ICD-10-CM

## 2025-07-09 PROCEDURE — 96372 THER/PROPH/DIAG INJ SC/IM: CPT | Performed by: INTERNAL MEDICINE

## 2025-07-09 RX ADMIN — CYANOCOBALAMIN 1000 MCG: 1000 INJECTION, SOLUTION INTRAMUSCULAR; SUBCUTANEOUS at 10:45

## 2025-07-10 NOTE — ASSESSMENT & PLAN NOTE
Currently the cough is better, but she has known VC problems. She should return to ENT as recommended

## 2025-07-14 DIAGNOSIS — R93.0 ABNORMAL MRI OF HEAD: Primary | ICD-10-CM

## 2025-07-14 NOTE — PROGRESS NOTES
Research MRI at [facility] reported to show unruputured aneurysm. She was called by vascular neurologist about an [place] location aneurysm with options to treat vs monitor.  Requests 2nd opinion.   Will order CTA to further evaluate.

## 2025-07-16 ENCOUNTER — HOSPITAL ENCOUNTER (OUTPATIENT)
Dept: RADIOLOGY | Facility: CLINIC | Age: 70
Discharge: HOME | End: 2025-07-16
Payer: MEDICARE

## 2025-07-16 DIAGNOSIS — R93.0 ABNORMAL MRI OF HEAD: ICD-10-CM

## 2025-07-16 LAB
ALBUMIN SERPL-MCNC: 4.7 G/DL (ref 3.6–5.1)
BUN SERPL-MCNC: 19 MG/DL (ref 7–25)
BUN/CREAT SERPL: NORMAL (CALC) (ref 6–22)
CALCIUM SERPL-MCNC: 9.4 MG/DL (ref 8.6–10.4)
CHLORIDE SERPL-SCNC: 101 MMOL/L (ref 98–110)
CO2 SERPL-SCNC: 29 MMOL/L (ref 20–32)
CREAT SERPL-MCNC: 0.85 MG/DL (ref 0.5–1.05)
EGFRCR SERPLBLD CKD-EPI 2021: 74 ML/MIN/1.73M2
GLUCOSE SERPL-MCNC: 85 MG/DL (ref 65–99)
PHOSPHATE SERPL-MCNC: 3.9 MG/DL (ref 2.1–4.3)
POTASSIUM SERPL-SCNC: 4.5 MMOL/L (ref 3.5–5.3)
SODIUM SERPL-SCNC: 138 MMOL/L (ref 135–146)

## 2025-07-16 PROCEDURE — 70496 CT ANGIOGRAPHY HEAD: CPT

## 2025-07-16 PROCEDURE — 70498 CT ANGIOGRAPHY NECK: CPT | Performed by: RADIOLOGY

## 2025-07-16 PROCEDURE — 2550000001 HC RX 255 CONTRASTS: Performed by: PSYCHIATRY & NEUROLOGY

## 2025-07-16 PROCEDURE — 70496 CT ANGIOGRAPHY HEAD: CPT | Performed by: RADIOLOGY

## 2025-07-16 RX ADMIN — IOHEXOL 70 ML: 350 INJECTION, SOLUTION INTRAVENOUS at 11:37

## 2025-07-17 ENCOUNTER — TELEPHONE (OUTPATIENT)
Dept: PRIMARY CARE | Facility: CLINIC | Age: 70
End: 2025-07-17
Payer: MEDICARE

## 2025-07-17 ENCOUNTER — TELEPHONE (OUTPATIENT)
Dept: NEUROLOGY | Facility: CLINIC | Age: 70
End: 2025-07-17
Payer: MEDICARE

## 2025-07-17 DIAGNOSIS — J01.00 ACUTE MAXILLARY SINUSITIS, RECURRENCE NOT SPECIFIED: Primary | ICD-10-CM

## 2025-07-17 RX ORDER — AMOXICILLIN AND CLAVULANATE POTASSIUM 875; 125 MG/1; MG/1
875 TABLET, FILM COATED ORAL 2 TIMES DAILY
Qty: 20 TABLET | Refills: 0 | Status: SHIPPED | OUTPATIENT
Start: 2025-07-17 | End: 2025-07-27

## 2025-07-17 NOTE — TELEPHONE ENCOUNTER
Patient had a CT recently as part of a study. Had incidental results regarding her tongue she is concerned with.       Please advise

## 2025-07-17 NOTE — TELEPHONE ENCOUNTER
DW pt  CT reviewed  Has an appointment with Dr Torres next Wed - recommend discuss tongue abnormality with him  Opacified sinuses - will start on Augmentin 875 mg bid

## 2025-07-17 NOTE — TELEPHONE ENCOUNTER
Naomi called to see about getting the results of the CTA that she had done recently.  She has an NPV appointment with Dr. Alves on 8/1 but is hoping to get those results before that appointment.    Naomi: [phone]    I explained that Dr. Alves is out of the office until next week.

## 2025-07-22 NOTE — PROGRESS NOTES
Sinus & Skull Base Surgery    Chief Complaint:  1. Chronic sinusitis   2. Posterior nasal drainage   3. Nasal airway obstruction, deviated nasal septum, inferior turbinate hypertrophy  4. Facial pressure / pain   5. Throat clearing, coughing   6. Asthma   7. Heartburn on proton pump inhibitor   8. Negative allergy testing 2023    History Of Present Illness:  Naomi Dunaway presents since last being seen 7/24/2024.     She is using mometasone 2 mg and rinses.  She is using it a few times per week and she will use it more consistently when her symptoms build.    She was evaluated by my voice partner in January 2025.  Speech therapy was recommended.  She did not follow-up with him as there were some issues with billing related to the initial visit.    Main Symptoms:  Patient does not have anterior nasal drainage.  Patient has posterior nasal drainage.  Patient has nasal airway obstruction.  At night.    Patient does not have facial pain.  Patient does not have facial pressure.  Patient does not have decreased sense of smell.   Associated Symptoms:  Patient does not have headaches.  Patient has throat clearing.  Patient has coughing.  Patient has dysphonia.  Comes and goes.   Patient does not have nasal bleeding.    Medications currently on for sinonasal symptoms:  Mometasone 2mg rinse Qday (several times per week)    Active Problems:  Problem List[1]    Past Medical History:  She has a past medical history of Acute upper respiratory infection, unspecified (10/24/2018), ADHD (attention deficit hyperactivity disorder), Anxiety, Asthma, Depression, Encounter for screening for human papillomavirus (HPV) (03/18/2014), Encounter for screening for malignant neoplasm of colon (03/18/2014), Fasciculation (01/16/2015), Other conditions influencing health status (03/18/2014), Pain in left foot (08/20/2020), Pain in right knee (09/05/2018), Personal history of other diseases of the respiratory system, Personal history of  "other specified conditions (11/24/2014), Personal history of other specified conditions (01/16/2015), PONV (postoperative nausea and vomiting), Sprain of other ligament of left ankle, initial encounter (06/04/2020), Unspecified sprain of left foot, initial encounter (06/04/2020), and Vaginal atrophy (01/20/2023).    Surgical History:  She has a past surgical history that includes Other surgical history (06/17/2019); Other surgical history (04/24/2017); Foot surgery (04/08/2015); Other surgical history (12/20/2021); and MR angio head wo IV contrast (1/23/2015).    Family History:  Family History[2]    Social History:  She reports that she quit smoking about 29 years ago. Her smoking use included cigarettes. She started smoking about 55 years ago. She has a 26 pack-year smoking history. She has never used smokeless tobacco. She reports current alcohol use of about 3.0 standard drinks of alcohol per week. She reports that she does not use drugs.    Allergies:  Feathers, House dust, Mold, and Sulfamethoxazole    Current Meds:  Current Medications[3]    Vitals:  Visit Vitals  Ht 1.562 m (5' 1.5\")   Wt 48.5 kg (107 lb)   BMI 19.89 kg/m²   OB Status Postmenopausal   Smoking Status Former   BSA 1.45 m²     Physical Exam:  Nose: On external exam there are neither lesions nor asymmetry of the nasal tip/dorsum. On anterior rhinoscopy, visualization posteriorly is limited on anterior examination. For this reason, to adequately evaluate posteriorly for masses, source of epistaxis, polypoid disease, debridement, and/or signs of infections, nasal endoscopy is indicated.  (Please see procedure below.)    SINONASAL ENDOSCOPY (CPT 68878): To better evaluate the patient's symptoms, sinonasal endoscopy is indicated.  After discussion of risks and benefits, and topical decongestion and anesthesia, an endoscope was used to perform nasal endoscopy on each side.  A time out identifying the patient, the procedure, the location of the " procedure and any concerns was performed prior to beginning the procedure.    Findings:  Examination of the right nasal cavity revealed a normal middle meatus without pus or polyps. There was a small amount of polyp tissue at the sphenoethmoid recess. There was no pus.  The septum was deviated to the right.  Examination of the left nasal cavity revealed polyp tissue at the middle meatus.  Without associated mucopurulence.  The sphenoethmoid recess appeared normal.    Provider Impressions:  1. Chronic sinusitis with nasal polyposis  2. Posterior nasal drainage   3. Nasal airway obstruction, deviated nasal septum, inferior turbinate hypertrophy  4. Throat clearing, coughing, dysphonia  5. Asthma   6. Heartburn on proton pump inhibitor   7. Negative allergy testing 2023    Discussion:  Naomi Dunaway and I discussed her exam and symptoms.  Prior clinical examination back in January demonstrated some thick mucus at the left middle meatus and today I could see polypoid tissue in that region.  She and I reviewed her CT angio of her head and neck performed July 16, 2025 and MRI brain from 8/27/2022 in regard to her sinuses.  Both studies demonstrated inflammatory changes particularly within each ethmoid cavity.    At the conclusion of today's assessment, I recommended continuation of mometasone 2 mg and with this type of therapy the more consistently she uses it the more improvement she will see.    In regard to the previous evaluation with my voice nurse practitioner partner, there was some kind of billing issue so she did not follow-up.  She had mentioned that it had something to do with him being a nurse practitioner and not a physician.  I gave her the names of my voice partners who are MD's in the event that she wished to follow-up.  We reviewed his exam and findings and she did see speech therapy.  I left it up to her whether or not she wished to pursue further assessment of her laryngeal concerns.    I recommended  repeat assessment with me in 12 months or sooner if she has progression of symptoms.  All questions were answered.    Between face-to-face contact, review the medical record, and documentation I spent greater than 35 minutes on this evaluation during the day of service.    Scribe and Provider Attestation  By signing my name below, IKole Scribe, attest that this documentation has been prepared under the direction and in the presence of Calvin Torres MD. All medical record entries made by the scribe were at the direction of Calvin Torres MD or personally dictated by Calvin Torres MD. I, Calvin Torres MD, have reviewed the chart and agree that the record accurately reflects my personal performance of the history, physical exam, discussion and plan.    Signature:  Calvin Torres MD       [1]   Patient Active Problem List  Diagnosis    Sacral back pain    Anxiety    Attention deficit hyperactivity disorder (ADHD)    Sensorineural hearing loss (SNHL)    Chronic constipation    Spondylosis of cervical region without myelopathy or radiculopathy    Hyperlipidemia    Myoclonus    Asthma    Chronic ethmoidal sinusitis    GERD (gastroesophageal reflux disease)    Abdominal bloating    Recurrent major depressive disorder, in full remission    Degeneration of intervertebral disc of cervical region    Deviated nasal septum    Effusion of right knee    Right knee DJD    Injury of meniscus of right knee    Internal derangement of knee    Mass of soft tissue of right lower extremity    Memory loss    Myalgia    Postural kyphosis of thoracic region    Segmental and somatic dysfunction    Tinnitus    Tremor    Tear of lateral meniscus of right knee    Hypertrophy of both inferior nasal turbinates    Lipoma of breast    Malignant neoplasm of skin    Ptosis of both eyelids    S/P lateral meniscectomy of right knee    Primary osteoarthritis of right knee    Cobalamin deficiency    Dysesthesia     Sprain of talofibular ligament of left ankle    Vitamin D deficiency    Vitamin B12 deficiency    Chronic cough    Major depressive disorder, recurrent severe without psychotic features (Multi)    Dysphonia   [2]   Family History  Problem Relation Name Age of Onset    Depression Mother      Hypertension Mother      Other (cardiac disorder) Mother      Depression Father      Other (cardiac disorder) Father      Other (food allergy) Father      Other (seasonal allergies) Father      Other (food allergy) Sister      Other (seasonal allergies) Sister      Other (sinus problem) Sister      Atrial fibrillation Other      Cancer Other      Alcohol abuse Mother Elina     Cancer Mother Elina     Hearing loss Mother Elina     Miscarriages / Stillbirths Mother Elina     Stroke Father Rich     Atrial fibrillation Father Bill     Cancer Sister Gavi     Hearing loss Sister Katelin    [3]   Current Outpatient Medications:     Advair Diskus 100-50 mcg/dose diskus inhaler, INHALE 1 PUFF BY MOUTH TWICE A DAY (MORNING AND EVENING ABOUT 12 HOURS APART), Disp: 60 each, Rfl: 2    albuterol 90 mcg/actuation inhaler, Inhale 1-2 puffs every 6 hours if needed for shortness of breath., Disp: 18 g, Rfl: 1    aluminum hydrox-magnesium carb (Gaviscon Extra Strength) 160-105 mg tablet,chewable, Chew 2 tablets 3 times a day. After meals and at bedtime, Disp: 240 tablet, Rfl: 2    amoxicillin-clavulanate (Augmentin) 875-125 mg tablet, Take 1 tablet by mouth 2 times a day for 10 days., Disp: 20 tablet, Rfl: 0    BIOTIN ORAL, Take by mouth. daily, Disp: , Rfl:     budesonide-formoterol (Symbicort) 80-4.5 mcg/actuation inhaler, Inhale 2 puffs 2 times a day. Rinse mouth with water after use to reduce aftertaste and incidence of candidiasis. Do not swallow., Disp: 10.2 g, Rfl: 11    cholecalciferol (Vitamin D-3) 25 MCG (1000 UT) capsule, Take 1 capsule (25 mcg) by mouth., Disp: , Rfl:     Deplin, algal oil, 15-90.314 mg capsule, Take 1 capsule by mouth  once daily., Disp: 90 capsule, Rfl: 1    DULoxetine (Cymbalta) 20 mg DR capsule, Take 1 capsule (20 mg) by mouth once daily. with duloxetine 60mg. Do not crush or chew., Disp: 90 capsule, Rfl: 1    DULoxetine (Cymbalta) 60 mg DR capsule, Take 1 capsule (60 mg) by mouth once daily. with duloxetine 20mg. Do not crush or chew., Disp: 90 capsule, Rfl: 1    guaiFENesin (Mucinex) 1,200 mg tablet extended release 12hr, Take 1 tablet (1,200 mg) by mouth every 12 hours., Disp: , Rfl:     lamoTRIgine (LaMICtal) 150 mg tablet, Take 1 tablet (150 mg) by mouth 2 times a day., Disp: 180 tablet, Rfl: 1    mometasone furoate, bulk, 100 % powder, 2 mg by sinus irrigation route once daily., Disp: 60 g, Rfl: 11    pantoprazole (Protonix) 40 mg EC tablet, Take 1 tablet (40 mg) by mouth 2 times a day. Do not crush, chew, or split., Disp: 180 tablet, Rfl: 3    LORazepam (Ativan) 0.5 mg tablet, Take 1 tablet (0.5 mg) by mouth once daily as needed for anxiety., Disp: 30 tablet, Rfl: 0    Current Facility-Administered Medications:     cyanocobalamin (Vitamin B-12) injection 1,000 mcg, 1,000 mcg, intramuscular, q7 days, Sherly Soares MD, 1,000 mcg at 06/04/25 1457    cyanocobalamin (Vitamin B-12) injection 1,000 mcg, 1,000 mcg, intramuscular, q14 days, Sherly Soares MD, 1,000 mcg at 10/31/23 1353    cyanocobalamin (Vitamin B-12) injection 1,000 mcg, 1,000 mcg, intramuscular, q14 days, Sherly Soares MD, 1,000 mcg at 07/09/25 1045

## 2025-07-23 ENCOUNTER — APPOINTMENT (OUTPATIENT)
Dept: OTOLARYNGOLOGY | Facility: CLINIC | Age: 70
End: 2025-07-23
Payer: MEDICARE

## 2025-07-23 VITALS — HEIGHT: 62 IN | BODY MASS INDEX: 19.69 KG/M2 | WEIGHT: 107 LBS

## 2025-07-23 DIAGNOSIS — R49.0 MUSCLE TENSION DYSPHONIA: Primary | ICD-10-CM

## 2025-07-23 DIAGNOSIS — J32.2 CHRONIC ETHMOIDAL SINUSITIS: ICD-10-CM

## 2025-07-23 DIAGNOSIS — J34.2 DEVIATED NASAL SEPTUM: ICD-10-CM

## 2025-07-23 DIAGNOSIS — R09.82 POST-NASAL DRAINAGE: ICD-10-CM

## 2025-07-23 PROCEDURE — 3008F BODY MASS INDEX DOCD: CPT | Performed by: OTOLARYNGOLOGY

## 2025-07-23 PROCEDURE — 1159F MED LIST DOCD IN RCRD: CPT | Performed by: OTOLARYNGOLOGY

## 2025-07-23 PROCEDURE — 1160F RVW MEDS BY RX/DR IN RCRD: CPT | Performed by: OTOLARYNGOLOGY

## 2025-07-23 PROCEDURE — 31231 NASAL ENDOSCOPY DX: CPT | Performed by: OTOLARYNGOLOGY

## 2025-07-23 PROCEDURE — 99214 OFFICE O/P EST MOD 30 MIN: CPT | Performed by: OTOLARYNGOLOGY

## 2025-07-24 LAB
ALBUMIN SERPL-MCNC: 4.5 G/DL (ref 3.6–5.1)
ALP SERPL-CCNC: 70 U/L (ref 37–153)
ALT SERPL-CCNC: 14 U/L (ref 6–29)
ANION GAP SERPL CALCULATED.4IONS-SCNC: 8 MMOL/L (CALC) (ref 7–17)
AST SERPL-CCNC: 21 U/L (ref 10–35)
BILIRUB SERPL-MCNC: 0.5 MG/DL (ref 0.2–1.2)
BUN SERPL-MCNC: 17 MG/DL (ref 7–25)
CALCIUM SERPL-MCNC: 9.7 MG/DL (ref 8.6–10.4)
CHLORIDE SERPL-SCNC: 101 MMOL/L (ref 98–110)
CHOLEST SERPL-MCNC: 269 MG/DL
CHOLEST/HDLC SERPL: 3 (CALC)
CO2 SERPL-SCNC: 30 MMOL/L (ref 20–32)
CREAT SERPL-MCNC: 0.81 MG/DL (ref 0.5–1.05)
EGFRCR SERPLBLD CKD-EPI 2021: 79 ML/MIN/1.73M2
ERYTHROCYTE [DISTWIDTH] IN BLOOD BY AUTOMATED COUNT: 12.1 % (ref 11–15)
GLUCOSE SERPL-MCNC: 90 MG/DL (ref 65–99)
HCT VFR BLD AUTO: 39.3 % (ref 35–45)
HDLC SERPL-MCNC: 91 MG/DL
HGB BLD-MCNC: 12.8 G/DL (ref 11.7–15.5)
LDLC SERPL CALC-MCNC: 157 MG/DL (CALC)
MCH RBC QN AUTO: 30.5 PG (ref 27–33)
MCHC RBC AUTO-ENTMCNC: 32.6 G/DL (ref 32–36)
MCV RBC AUTO: 93.6 FL (ref 80–100)
NONHDLC SERPL-MCNC: 178 MG/DL (CALC)
PLATELET # BLD AUTO: 372 THOUSAND/UL (ref 140–400)
PMV BLD REES-ECKER: 9.8 FL (ref 7.5–12.5)
POTASSIUM SERPL-SCNC: 4.6 MMOL/L (ref 3.5–5.3)
PROT SERPL-MCNC: 6.7 G/DL (ref 6.1–8.1)
RBC # BLD AUTO: 4.2 MILLION/UL (ref 3.8–5.1)
SODIUM SERPL-SCNC: 139 MMOL/L (ref 135–146)
TRIGL SERPL-MCNC: 101 MG/DL
TSH SERPL-ACNC: 3.37 MIU/L (ref 0.4–4.5)
VIT B12 SERPL-MCNC: 509 PG/ML (ref 200–1100)
WBC # BLD AUTO: 5.6 THOUSAND/UL (ref 3.8–10.8)

## 2025-07-25 ENCOUNTER — RESULTS FOLLOW-UP (OUTPATIENT)
Dept: NEUROLOGY | Facility: CLINIC | Age: 70
End: 2025-07-25
Payer: MEDICARE

## 2025-07-25 LAB
A ALTERNATA IGE QN: <0.1 KU/L
A ALTERNATA IGE RAST: 0
A FUMIGATUS IGE QN: <0.1 KU/L
A FUMIGATUS IGE RAST: 0
BERMUDA GRASS IGE QN: <0.1 KU/L
BERMUDA GRASS IGE RAST: 0
BOXELDER IGE QN: <0.1 KU/L
BOXELDER IGE RAST: 0
C HERBARUM IGE QN: <0.1 KU/L
C HERBARUM IGE RAST: 0
CALIF WALNUT POLN IGE QN: <0.1 KU/L
CALIF WALNUT POLN IGE RAST: 0
CAT DANDER IGE QN: <0.1 KU/L
CAT DANDER IGE RAST: 0
CMN PIGWEED IGE QN: <0.1 KU/L
CMN PIGWEED IGE RAST: 0
COMMON RAGWEED IGE QN: <0.1 KU/L
COMMON RAGWEED IGE RAST: 0
COTTONWOOD IGE QN: <0.1 KU/L
COTTONWOOD IGE RAST: 0
D FARINAE IGE QN: <0.1 KU/L
D FARINAE IGE RAST: 0
D PTERONYSS IGE QN: <0.1 KU/L
D PTERONYSS IGE RAST: 0
DOG DANDER IGE QN: <0.1 KU/L
DOG DANDER IGE RAST: 0
IGE SERPL-ACNC: 556 KU/L
LONDON PLANE IGE QN: <0.1 KU/L
LONDON PLANE IGE RAST: 0
MOUSE URINE PROT IGE QN: <0.1 KU/L
MOUSE URINE PROT IGE RAST: 0
MT JUNIPER IGE QN: <0.1 KU/L
MT JUNIPER IGE RAST: 0
P NOTATUM IGE QN: <0.1 KU/L
P NOTATUM IGE RAST: 0
PECAN/HICK TREE IGE QN: <0.1 KU/L
PECAN/HICK TREE IGE RAST: 0
REF LAB TEST REF RANGE: NORMAL
ROACH IGE QN: <0.1 KU/L
ROACH IGE RAST: 0
SALTWORT IGE QN: <0.1 KU/L
SALTWORT IGE RAST: 0
SHEEP SORREL IGE QN: <0.1 KU/L
SHEEP SORREL IGE RAST: 0
SILVER BIRCH IGE QN: <0.1 KU/L
SILVER BIRCH IGE RAST: 0
TIMOTHY IGE QN: <0.1 KU/L
TIMOTHY IGE RAST: 0
WHITE ASH IGE QN: <0.1 KU/L
WHITE ASH IGE RAST: 0
WHITE ELM IGE QN: <0.1 KU/L
WHITE ELM IGE RAST: 0
WHITE MULBERRY IGE QN: <0.1 KU/L
WHITE MULBERRY IGE RAST: 0
WHITE OAK IGE QN: <0.1 KU/L
WHITE OAK IGE RAST: 0

## 2025-07-25 NOTE — TELEPHONE ENCOUNTER
Patient aware. She has appointment with Dr. Alves next week and plans to keep that to further discuss options and who best to see going forward.     ----- Message from Mariza Alves sent at 7/23/2025  2:55 PM EDT -----  Please let Mrs Dunaway know that the CTA does confirm a small abnormality that is likely an aneurysm.  Agree with going ahead with the angiogram to better characterize its anatomy to determine risks vs   benefits of preemptive treatment vs conservative surveillance.    Looks like she is scheduled for angio next month- ask if it would be OK to have films sent to PACs for us to rereview for her.   ----- Message -----  From: Interface, Radiology Results In  Sent: 7/17/2025  12:27 PM EDT  To: Mariza Alves MD

## 2025-07-28 ENCOUNTER — APPOINTMENT (OUTPATIENT)
Dept: PRIMARY CARE | Facility: CLINIC | Age: 70
End: 2025-07-28
Payer: MEDICARE

## 2025-07-28 VITALS
SYSTOLIC BLOOD PRESSURE: 114 MMHG | WEIGHT: 106.4 LBS | OXYGEN SATURATION: 97 % | HEART RATE: 79 BPM | TEMPERATURE: 97.3 F | BODY MASS INDEX: 20.09 KG/M2 | DIASTOLIC BLOOD PRESSURE: 69 MMHG | HEIGHT: 61 IN

## 2025-07-28 DIAGNOSIS — I67.1 CEREBRAL ANEURYSM (HHS-HCC): Primary | ICD-10-CM

## 2025-07-28 DIAGNOSIS — E78.2 MIXED HYPERLIPIDEMIA: ICD-10-CM

## 2025-07-28 DIAGNOSIS — Z00.00 ROUTINE GENERAL MEDICAL EXAMINATION AT A HEALTH CARE FACILITY: ICD-10-CM

## 2025-07-28 DIAGNOSIS — Z12.31 ENCOUNTER FOR SCREENING MAMMOGRAM FOR MALIGNANT NEOPLASM OF BREAST: ICD-10-CM

## 2025-07-28 PROBLEM — S93.492A SPRAIN OF TALOFIBULAR LIGAMENT OF LEFT ANKLE: Status: RESOLVED | Noted: 2024-07-22 | Resolved: 2025-07-28

## 2025-07-28 PROBLEM — S83.281A TEAR OF LATERAL MENISCUS OF RIGHT KNEE: Status: RESOLVED | Noted: 2021-10-04 | Resolved: 2025-07-28

## 2025-07-28 PROBLEM — M79.10 MYALGIA: Status: RESOLVED | Noted: 2023-06-13 | Resolved: 2025-07-28

## 2025-07-28 PROBLEM — S83.8X1A INJURY OF MENISCUS OF RIGHT KNEE: Status: RESOLVED | Noted: 2023-06-13 | Resolved: 2025-07-28

## 2025-07-28 PROBLEM — M25.461 EFFUSION OF RIGHT KNEE: Status: RESOLVED | Noted: 2023-06-13 | Resolved: 2025-07-28

## 2025-07-28 PROBLEM — M79.89 MASS OF SOFT TISSUE OF RIGHT LOWER EXTREMITY: Status: RESOLVED | Noted: 2023-06-13 | Resolved: 2025-07-28

## 2025-07-28 PROCEDURE — 1159F MED LIST DOCD IN RCRD: CPT | Performed by: INTERNAL MEDICINE

## 2025-07-28 PROCEDURE — 96372 THER/PROPH/DIAG INJ SC/IM: CPT | Performed by: INTERNAL MEDICINE

## 2025-07-28 PROCEDURE — 3008F BODY MASS INDEX DOCD: CPT | Performed by: INTERNAL MEDICINE

## 2025-07-28 PROCEDURE — 93000 ELECTROCARDIOGRAM COMPLETE: CPT | Performed by: INTERNAL MEDICINE

## 2025-07-28 PROCEDURE — G0439 PPPS, SUBSEQ VISIT: HCPCS | Performed by: INTERNAL MEDICINE

## 2025-07-28 PROCEDURE — 99213 OFFICE O/P EST LOW 20 MIN: CPT | Performed by: INTERNAL MEDICINE

## 2025-07-28 PROCEDURE — 1170F FXNL STATUS ASSESSED: CPT | Performed by: INTERNAL MEDICINE

## 2025-07-28 RX ORDER — ALBUTEROL SULFATE 90 UG/1
1-2 INHALANT RESPIRATORY (INHALATION) EVERY 6 HOURS PRN
Qty: 18 G | Refills: 3 | Status: SHIPPED | OUTPATIENT
Start: 2025-07-28

## 2025-07-28 RX ADMIN — CYANOCOBALAMIN 1000 MCG: 1000 INJECTION, SOLUTION INTRAMUSCULAR; SUBCUTANEOUS at 11:19

## 2025-07-28 ASSESSMENT — ACTIVITIES OF DAILY LIVING (ADL)
MANAGING_FINANCES: INDEPENDENT
DRESSING: INDEPENDENT
BATHING: INDEPENDENT
TAKING_MEDICATION: INDEPENDENT
DOING_HOUSEWORK: INDEPENDENT
GROCERY_SHOPPING: INDEPENDENT

## 2025-07-28 ASSESSMENT — PATIENT HEALTH QUESTIONNAIRE - PHQ9
5. POOR APPETITE OR OVEREATING: NOT AT ALL
SUM OF ALL RESPONSES TO PHQ QUESTIONS 1-9: 9
7. TROUBLE CONCENTRATING ON THINGS, SUCH AS READING THE NEWSPAPER OR WATCHING TELEVISION: NOT AT ALL
8. MOVING OR SPEAKING SO SLOWLY THAT OTHER PEOPLE COULD HAVE NOTICED. OR THE OPPOSITE, BEING SO FIGETY OR RESTLESS THAT YOU HAVE BEEN MOVING AROUND A LOT MORE THAN USUAL: NOT AT ALL
SUM OF ALL RESPONSES TO PHQ9 QUESTIONS 1 AND 2: 6
3. TROUBLE FALLING OR STAYING ASLEEP OR SLEEPING TOO MUCH: NOT AT ALL
6. FEELING BAD ABOUT YOURSELF - OR THAT YOU ARE A FAILURE OR HAVE LET YOURSELF OR YOUR FAMILY DOWN: SEVERAL DAYS
9. THOUGHTS THAT YOU WOULD BE BETTER OFF DEAD, OR OF HURTING YOURSELF: NOT AT ALL
2. FEELING DOWN, DEPRESSED OR HOPELESS: NEARLY EVERY DAY
4. FEELING TIRED OR HAVING LITTLE ENERGY: MORE THAN HALF THE DAYS
1. LITTLE INTEREST OR PLEASURE IN DOING THINGS: NEARLY EVERY DAY

## 2025-07-28 NOTE — PROGRESS NOTES
Subjective   Reason for Visit: Naomi Dunaway is an 70 y.o. female here for a Medicare Wellness visit.               History of Present Illness  The patient is a 70-year-old female who comes in for a wellness visit.    Aneurysm  - She reports feeling well overall, with the exception of an aneurysm.  - She has not yet consulted with Dr. Alves but has scheduled an appointment for this week.  - A CT angiogram was ordered by Dr. Alves due to the inability to access the MRI results.  - The findings were consistent with those from the clinic, indicating a protrusion on the aneurysm.  - She is considering seeking treatment at the clinic, where she believes the cerebrovascular team may be more equipped to handle her condition.  - An angiogram is scheduled at the clinic for 08/20/2025.    Eosinophil Count  - She mentions that her eosinophil count was abnormal, but she has no known allergies.  - Despite lifelong allergies, skin and blood tests have consistently returned negative results.    Magallanes's Esophagus  - She has been diagnosed with Magallanes's esophagus but reports feeling well.  - She was referred to an ENT specialist who performed an endoscopy and subsequently referred her to speech therapy and a voice specialist.  - She has completed these treatments and does not plan to return to speech therapy.  - She is currently taking Protonix 40 mg twice daily for reflux and Magallanes's esophagus.  - She reports some difficulty swallowing, necessitating taking pills one at a time with food and water, but she does not experience choking while eating.         Supplemental Information  - She completed a course of Augmentin, even though Dr. Torres did not suspect an infection.  - During her annual check-up with him last week, he expressed no concern about the other findings from the CT scan.         Retired -  Children - 2 (32, 30)  Tob - Nonsmoker  Alcohol - 2-3 per week  Marijuana  - denies  Exercise - none  Opiates -  "none    Patient Care Team:  Sherly Soares MD as PCP - General  Sherly Soares MD as PCP - [facility]  Vinicius Acharya MD as Psychiatrist (Psychiatry)  Tosha Salomon MD as Referring Physician (Allergy and Immunology)     Review of Systems   Constitutional:  Negative for activity change, appetite change, fatigue and unexpected weight change.   HENT:  Negative for ear pain, hearing loss, tinnitus, trouble swallowing and voice change.    Eyes:  Negative for visual disturbance.   Respiratory:  Negative for cough, chest tightness and wheezing.    Cardiovascular:  Negative for chest pain, palpitations and leg swelling.   Gastrointestinal:  Negative for blood in stool, constipation, diarrhea, nausea and vomiting.   Genitourinary:  Negative for difficulty urinating, hematuria and vaginal bleeding.   Musculoskeletal:  Negative for arthralgias.   Skin:  Negative for rash.   Neurological:  Negative for dizziness, tremors, syncope, light-headedness and headaches.       Objective   Vitals:  Visit Vitals  /69   Pulse 79   Temp 36.3 °C (97.3 °F)   Ht (!) 1.543 m (5' 0.75\")   Wt 48.3 kg (106 lb 6.4 oz)   SpO2 97%   BMI 20.27 kg/m²   Smoking Status Former   BSA 1.44 m²        Physical Exam  Vitals and nursing note reviewed.   Constitutional:       General: She is not in acute distress.     Appearance: Normal appearance.   HENT:      Head: Normocephalic.      Right Ear: Tympanic membrane and ear canal normal. There is no impacted cerumen.      Left Ear: Tympanic membrane and ear canal normal. There is no impacted cerumen.      Nose: Nose normal.      Mouth/Throat:      Mouth: Mucous membranes are moist.      Pharynx: No oropharyngeal exudate or posterior oropharyngeal erythema.   Eyes:      Extraocular Movements: Extraocular movements intact.      Conjunctiva/sclera: Conjunctivae normal.      Pupils: Pupils are equal, round, and reactive to light.   Cardiovascular:      Rate and Rhythm: Normal rate " and regular rhythm.      Pulses: Normal pulses.      Heart sounds: Normal heart sounds.   Pulmonary:      Effort: Pulmonary effort is normal.      Breath sounds: Normal breath sounds.   Chest:   Breasts:     Right: Normal. No inverted nipple, mass, nipple discharge or tenderness.      Left: Normal. No inverted nipple, mass, nipple discharge or tenderness.   Abdominal:      General: Abdomen is flat. Bowel sounds are normal.      Palpations: Abdomen is soft.   Musculoskeletal:         General: No swelling, tenderness or deformity.      Cervical back: Normal range of motion and neck supple.      Right lower leg: No edema.      Left lower leg: No edema.   Lymphadenopathy:      Upper Body:      Right upper body: No axillary adenopathy.      Left upper body: No axillary adenopathy.   Skin:     General: Skin is warm and dry.   Neurological:      General: No focal deficit present.      Mental Status: She is alert and oriented to person, place, and time. Mental status is at baseline.   Psychiatric:         Mood and Affect: Mood normal.       Assessment & Plan  1. Aneurysm  - Appointment with Dr. Alves scheduled this week and angiogram at the clinic on 08/20/2025  - Monitoring for updates post-angiogram and further recommendations based on results    2. Elevated Immunoglobulin E  - Elevated immunoglobulin E levels noted, likely due to undetected allergies  - Continued observation and potential further testing if symptoms persist    3. Magallanes's Esophagus  - Currently taking Protonix 40 mg twice a day  - Reports some difficulty swallowing but no significant issues like choking  - Advised to continue current medication regimen and monitor symptoms  - Discussion about ongoing management and potential need for further evaluation if symptoms worsen    4. Elevated Cholesterol  - Cholesterol levels are elevated, but not the primary concern at the moment  - Further evaluation and management to be considered after addressing the  aneurysm  - Reviewed recent lab results and discussed the implications  - Plan to reassess cholesterol management after aneurysm treatment    5. Depression - sees psychiatry     1. Cerebral aneurysm ([facility])        2. Routine general medical examination at a health care facility  albuterol 90 mcg/actuation inhaler    ECG 12 lead (Clinic Performed)      3. Encounter for screening mammogram for malignant neoplasm of breast  BI mammo bilateral screening tomosynthesis      4. Mixed hyperlipidemia            Results            Follow up with me in 4 months  Sherly Soares MD   This medical note was created with the assistance of artificial intelligence (AI) for documentation purposes. The content has been reviewed and confirmed by the healthcare provider for accuracy and completeness. Patient consented to the use of audio recording and use of AI during their visit.

## 2025-07-31 ASSESSMENT — ENCOUNTER SYMPTOMS
NAUSEA: 0
VOMITING: 0
TREMORS: 0
APPETITE CHANGE: 0
DIZZINESS: 0
CONSTIPATION: 0
FATIGUE: 0
DIARRHEA: 0
DIFFICULTY URINATING: 0
TROUBLE SWALLOWING: 0
HEADACHES: 0
WHEEZING: 0
ARTHRALGIAS: 0
ACTIVITY CHANGE: 0
PALPITATIONS: 0
VOICE CHANGE: 0
LIGHT-HEADEDNESS: 0
COUGH: 0
UNEXPECTED WEIGHT CHANGE: 0
CHEST TIGHTNESS: 0
HEMATURIA: 0
BLOOD IN STOOL: 0

## 2025-07-31 ASSESSMENT — PATIENT HEALTH QUESTIONNAIRE - PHQ9
SUM OF ALL RESPONSES TO PHQ9 QUESTIONS 1 AND 2: 6
1. LITTLE INTEREST OR PLEASURE IN DOING THINGS: NEARLY EVERY DAY
2. FEELING DOWN, DEPRESSED OR HOPELESS: NEARLY EVERY DAY

## 2025-08-01 ENCOUNTER — OFFICE VISIT (OUTPATIENT)
Dept: NEUROLOGY | Facility: CLINIC | Age: 70
End: 2025-08-01
Payer: MEDICARE

## 2025-08-01 VITALS — DIASTOLIC BLOOD PRESSURE: 64 MMHG | HEART RATE: 81 BPM | SYSTOLIC BLOOD PRESSURE: 118 MMHG

## 2025-08-01 DIAGNOSIS — R93.0 ABNORMAL MRI OF HEAD: Primary | ICD-10-CM

## 2025-08-01 PROCEDURE — 99214 OFFICE O/P EST MOD 30 MIN: CPT | Performed by: PSYCHIATRY & NEUROLOGY

## 2025-08-01 NOTE — PROGRESS NOTES
"   [facility]   Naomi Dunaway is a 70 y.o. year old female presenting for neurologic evaluation.   PCP: Sherly Soares MD    25 - Neurological evaluation for brain aneurysm.   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    No history of SAH. Family history- no definite SAH, but grandparents in 30s  of some vascular death, uncertain details.   Vascular risk factors- none.  Treated for Bipolar/ anxiety- has been anxious since the diagnosis.      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    No CT head results found for the past 12 months  CT angio head and neck w and wo IV contrast  Result Date: 2025  1. A 0.2 x 0.2 cm outpouching/contour irregularity of the anterior communicating artery noted at the junction with the left A1 segment which may represent a prominent origin versus a small aneurysm. 2. Otherwise, no evidence of source vessel arterial occlusion, flow significant stenosis, dissection, or additional aneurysm within the head and neck. 3. Asymmetric soft tissue density within the left base of the tongue that extends into the vallecula may represent prominent lingular tonsillar tissue. Consider correlation with direct visualization as clinically warranted.   I personally reviewed the images/study and I agree with the findings as stated by Duane Quezada MD. This study was interpreted at [facility], [place], [place].   MACRO: None   Signed by: Fiordaliza Jacobo 2025 12:26 PM Dictation workstation:   [id number]       Lab Results   Component Value Date    CHOL 269 (H) 2025    TRIG 101 2025    HDL 91 2025    CHHDL 3.0 2025    LDLF 161 (H) 2023    VLDL 17 2024    NHDL 178 (H) 2025   No results found for: \"BNP\", " "\"HGBA1C\"  Lab Results   Component Value Date    GLUCOSE 90 07/23/2025     07/23/2025    K 4.6 07/23/2025     07/23/2025    CO2 30 07/23/2025    ANIONGAP 8 07/23/2025    BUN 17 07/23/2025    CREATININE 0.81 07/23/2025    CALCIUM 9.7 07/23/2025    PHOS 3.9 07/15/2025    ALBUMIN 4.5 07/23/2025     Lab Results   Component Value Date    CALCIUM 9.7 07/23/2025    PHOS 3.9 07/15/2025    PROT 6.7 07/23/2025    ALBUMIN 4.5 07/23/2025    AST 21 07/23/2025    ALT 14 07/23/2025    ALKPHOS 70 07/23/2025    BILITOT 0.5 07/23/2025     Lab Results   Component Value Date    WBC 5.6 07/23/2025    HGB 12.8 07/23/2025    HCT 39.3 07/23/2025    MCV 93.6 07/23/2025     07/23/2025   No results found for: \"INR\"  Lab Results   Component Value Date    TSH 3.37 07/23/2025       Relevant ROS, Problem list, Past Medical/ Surgical/ Family/ Social history- reviewed and pertinent details noted in history.     Objective     Visit Vitals  /64   Pulse 81   OB Status Postmenopausal   Smoking Status Former       Assessment/Plan   1. Abnormal MRI of head      PLAN   Reviewed outside films, reviewed CTA with her and .   Discussed the possibilities- small aneurysm warranting treatment, small aneurysm warranting surveillance, anatomical variant not aneurysm not warranting surveillance or treatment.    Using current variables of 2mm aneurysm and irregularity, calculated estimated rupture risk is low, <1% / 5 years for which benefits/ risks would require more discussion.   Recommend conventional angiogram to delineate specific diagnosis to guide treatment options.     Reviewed Goals for STROKE PREVENTION- recommendations to reduce the risk of vascular events and promote brain health include monitoring and management of vascular risk factors and lifestyle choices to goal values.     Blood pressure- Normal BP is <120/80 mmHg.  Blood Pressure : Goal is less than 130/80 mmHg  Cholesterol- Ideal lipid profile is an LDL-cholesterol " "<70 mg/dl, total cholesterol <200 mg/dl, fasting triglycerides < 150 mg/dl and HDL-cholesterol >55 mg/dl.   Blood sugar- Blood Sugar : Goal is normal fasting sugar between  mg/dl   Healthy physical activity- Goal is a moderate level of exercise at least 30 minutes/day, most days of the week.   Healthy weight- Goal is an ideal weight with a waistline <40\" for men or <35\" for women, and BMI of 18.5-25.   Healthy diet- is rich in vegetables, fruits, whole grains, legumes and fish, low in salt, and avoids red meats and processed/ refined foods.   Healthy sleep- is restorative, ~7 hours/night, with identification and treatment of obstructive/ central sleep apnea that increases the risk of stroke and heart disease.   Smoking- Goal is to stop smoking any tobacco product and avoid second-hand smoke. For help to quit all forms of tobacco, call 9-960-QUIT-NOW (1-876.481.5882) to speak with a specialist at the [facility].    Alcohol- Goal is moderation; no more than 2 servings for men and 1 serving for non-pregnant women.   Drug use- Goal is avoidance of illicit drugs that can cause blood pressure spikes and damage to blood vessels.   Stroke Warning Signs- know the symptoms of stroke and the importance of calling 911/EMS to access the quickest treatment.     Total time spent on the same day of the patient encounter, before/ during / after the visit, encompassing medical record review/ evaluation/ management/ counseling/ documentation is 74m  No orders of the defined types were placed in this encounter.                      "

## 2025-08-04 DIAGNOSIS — Z86.79 HISTORY OF CEREBRAL ANEURYSM: ICD-10-CM

## 2025-08-04 DIAGNOSIS — I67.1 CEREBRAL ANEURYSM (HHS-HCC): Primary | ICD-10-CM

## 2025-08-11 ENCOUNTER — APPOINTMENT (OUTPATIENT)
Dept: RADIOLOGY | Facility: HOSPITAL | Age: 70
End: 2025-08-11
Payer: MEDICARE

## 2025-08-22 ENCOUNTER — APPOINTMENT (OUTPATIENT)
Dept: GASTROENTEROLOGY | Facility: CLINIC | Age: 70
End: 2025-08-22
Payer: MEDICARE

## 2025-09-02 DIAGNOSIS — F33.42 RECURRENT MAJOR DEPRESSIVE DISORDER, IN FULL REMISSION: ICD-10-CM

## 2025-09-02 DIAGNOSIS — F41.9 ANXIETY: ICD-10-CM

## 2025-09-02 RX ORDER — DULOXETIN HYDROCHLORIDE 60 MG/1
60 CAPSULE, DELAYED RELEASE ORAL DAILY
Qty: 90 CAPSULE | Refills: 1 | Status: SHIPPED | OUTPATIENT
Start: 2025-09-02 | End: 2026-03-01

## 2025-09-02 RX ORDER — DULOXETIN HYDROCHLORIDE 20 MG/1
20 CAPSULE, DELAYED RELEASE ORAL DAILY
Qty: 90 CAPSULE | Refills: 1 | Status: SHIPPED | OUTPATIENT
Start: 2025-09-02 | End: 2026-03-01

## 2025-09-10 ENCOUNTER — APPOINTMENT (OUTPATIENT)
Dept: PRIMARY CARE | Facility: CLINIC | Age: 70
End: 2025-09-10
Payer: MEDICARE

## 2025-09-11 ENCOUNTER — APPOINTMENT (OUTPATIENT)
Dept: RADIOLOGY | Facility: CLINIC | Age: 70
End: 2025-09-11
Payer: MEDICARE

## 2025-09-23 ENCOUNTER — APPOINTMENT (OUTPATIENT)
Dept: BEHAVIORAL HEALTH | Facility: CLINIC | Age: 70
End: 2025-09-23
Payer: MEDICARE

## 2025-11-24 ENCOUNTER — APPOINTMENT (OUTPATIENT)
Dept: PRIMARY CARE | Facility: CLINIC | Age: 70
End: 2025-11-24
Payer: MEDICARE

## 2026-01-05 ENCOUNTER — APPOINTMENT (OUTPATIENT)
Dept: ALLERGY | Facility: CLINIC | Age: 71
End: 2026-01-05
Payer: MEDICARE

## 2026-07-29 ENCOUNTER — APPOINTMENT (OUTPATIENT)
Facility: CLINIC | Age: 71
End: 2026-07-29
Payer: MEDICARE